# Patient Record
Sex: FEMALE | Race: WHITE | NOT HISPANIC OR LATINO | Employment: PART TIME | URBAN - METROPOLITAN AREA
[De-identification: names, ages, dates, MRNs, and addresses within clinical notes are randomized per-mention and may not be internally consistent; named-entity substitution may affect disease eponyms.]

---

## 2017-01-26 ENCOUNTER — APPOINTMENT (OUTPATIENT)
Dept: WOMENS IMAGING | Facility: HOSPITAL | Age: 62
End: 2017-01-26

## 2017-01-26 PROCEDURE — 77067 SCR MAMMO BI INCL CAD: CPT | Performed by: RADIOLOGY

## 2017-03-24 RX ORDER — LEVOTHYROXINE SODIUM 0.1 MG/1
100 TABLET ORAL DAILY
Qty: 30 TABLET | Refills: 0 | Status: SHIPPED | OUTPATIENT
Start: 2017-03-24 | End: 2017-05-01 | Stop reason: SDUPTHER

## 2017-05-01 RX ORDER — LEVOTHYROXINE SODIUM 0.1 MG/1
TABLET ORAL
Qty: 30 TABLET | Refills: 1 | Status: SHIPPED | OUTPATIENT
Start: 2017-05-01 | End: 2017-07-03 | Stop reason: SDUPTHER

## 2017-06-09 ENCOUNTER — HOSPITAL ENCOUNTER (OUTPATIENT)
Dept: GENERAL RADIOLOGY | Facility: HOSPITAL | Age: 62
Discharge: HOME | End: 2017-06-09
Attending: FAMILY MEDICINE | Admitting: FAMILY MEDICINE

## 2017-06-09 ENCOUNTER — APPOINTMENT (OUTPATIENT)
Dept: GENERAL RADIOLOGY | Facility: HOSPITAL | Age: 62
End: 2017-06-09
Attending: FAMILY MEDICINE

## 2017-06-09 DIAGNOSIS — Z13.1 SCREENING FOR DIABETES MELLITUS (DM): ICD-10-CM

## 2017-06-09 DIAGNOSIS — Z00.00 ANNUAL PHYSICAL EXAM: Primary | ICD-10-CM

## 2017-06-09 PROCEDURE — 71020 HC CHEST PA AND LATERAL: CPT

## 2017-06-10 LAB
ALBUMIN SERPL-MCNC: 4.7 G/DL (ref 3.5–5.5)
ALBUMIN/GLOB SERPL: 2.2 {RATIO} (ref 1.2–2.2)
ALP SERPL-CCNC: 57 IU/L (ref 39–117)
ALT SERPL-CCNC: 16 IU/L (ref 0–32)
AST SERPL-CCNC: 21 IU/L (ref 0–40)
BASOPHILS # BLD AUTO: 0 X10E3/UL (ref 0–0.2)
BASOPHILS NFR BLD AUTO: 1 %
BILIRUB SERPL-MCNC: 0.5 MG/DL (ref 0–1.2)
BUN SERPL-MCNC: 11 MG/DL (ref 6–24)
BUN/CREAT SERPL: 18 (ref 9–23)
CALCIUM SERPL-MCNC: 9.9 MG/DL (ref 8.7–10.2)
CHLORIDE SERPL-SCNC: 98 MMOL/L (ref 96–106)
CHOLEST SERPL-MCNC: 217 MG/DL (ref 100–199)
CO2 SERPL-SCNC: 23 MMOL/L (ref 18–29)
CREAT SERPL-MCNC: 0.62 MG/DL (ref 0.57–1)
EOSINOPHIL # BLD AUTO: 0.1 X10E3/UL (ref 0–0.4)
EOSINOPHIL NFR BLD AUTO: 2 %
ERYTHROCYTE [DISTWIDTH] IN BLOOD BY AUTOMATED COUNT: 14.3 % (ref 12.3–15.4)
FT4I SERPL CALC-MCNC: 3.6 (ref 1.2–4.9)
GLOBULIN SER CALC-MCNC: 2.1 G/DL (ref 1.5–4.5)
GLUCOSE SERPL-MCNC: 79 MG/DL (ref 65–99)
HBA1C MFR BLD: 5.5 % (ref 4.8–5.6)
HCT VFR BLD AUTO: 38.9 % (ref 34–46.6)
HDLC SERPL-MCNC: 67 MG/DL
HGB BLD-MCNC: 12.7 G/DL (ref 11.1–15.9)
IMM GRANULOCYTES # BLD: 0 X10E3/UL (ref 0–0.1)
IMM GRANULOCYTES NFR BLD: 0 %
LDLC SERPL CALC-MCNC: 132 MG/DL (ref 0–99)
LDLC/HDLC SERPL: 2 RATIO UNITS (ref 0–3.2)
LYMPHOCYTES # BLD AUTO: 1.6 X10E3/UL (ref 0.7–3.1)
LYMPHOCYTES NFR BLD AUTO: 38 %
MCH RBC QN AUTO: 31.7 PG (ref 26.6–33)
MCHC RBC AUTO-ENTMCNC: 32.6 G/DL (ref 31.5–35.7)
MCV RBC AUTO: 97 FL (ref 79–97)
MONOCYTES # BLD AUTO: 0.5 X10E3/UL (ref 0.1–0.9)
MONOCYTES NFR BLD AUTO: 11 %
NEUTROPHILS # BLD AUTO: 2.1 X10E3/UL (ref 1.4–7)
NEUTROPHILS NFR BLD AUTO: 48 %
PLATELET # BLD AUTO: 365 X10E3/UL (ref 150–379)
POTASSIUM SERPL-SCNC: 5 MMOL/L (ref 3.5–5.2)
PROT SERPL-MCNC: 6.8 G/DL (ref 6–8.5)
RBC # BLD AUTO: 4.01 X10E6/UL (ref 3.77–5.28)
SODIUM SERPL-SCNC: 139 MMOL/L (ref 134–144)
T3RU NFR SERPL: 36 % (ref 24–39)
T4 SERPL-MCNC: 10 UG/DL (ref 4.5–12)
TRIGL SERPL-MCNC: 92 MG/DL (ref 0–149)
TSH SERPL DL<=0.005 MIU/L-ACNC: 0.34 UIU/ML (ref 0.45–4.5)
VLDLC SERPL CALC-MCNC: 18 MG/DL (ref 5–40)
WBC # BLD AUTO: 4.3 X10E3/UL (ref 3.4–10.8)

## 2017-06-14 ENCOUNTER — OFFICE VISIT (OUTPATIENT)
Dept: FAMILY MEDICINE CLINIC | Facility: CLINIC | Age: 62
End: 2017-06-14

## 2017-06-14 ENCOUNTER — TELEPHONE (OUTPATIENT)
Dept: FAMILY MEDICINE CLINIC | Facility: CLINIC | Age: 62
End: 2017-06-14

## 2017-06-14 VITALS
TEMPERATURE: 98.1 F | OXYGEN SATURATION: 98 % | HEIGHT: 60 IN | WEIGHT: 114.6 LBS | DIASTOLIC BLOOD PRESSURE: 90 MMHG | HEART RATE: 65 BPM | SYSTOLIC BLOOD PRESSURE: 138 MMHG | BODY MASS INDEX: 22.5 KG/M2

## 2017-06-14 DIAGNOSIS — Z53.21 PATIENT LEFT WITHOUT BEING SEEN: ICD-10-CM

## 2017-06-14 DIAGNOSIS — Z00.00 ANNUAL PHYSICAL EXAM: Primary | ICD-10-CM

## 2017-06-14 LAB
BILIRUB BLD-MCNC: NEGATIVE MG/DL
CLARITY, POC: CLEAR
COLOR UR: YELLOW
GLUCOSE UR STRIP-MCNC: NEGATIVE MG/DL
KETONES UR QL: NEGATIVE
LEUKOCYTE EST, POC: ABNORMAL
NITRITE UR-MCNC: NEGATIVE MG/ML
PH UR: 6 [PH] (ref 5–8)
PROT UR STRIP-MCNC: NEGATIVE MG/DL
RBC # UR STRIP: ABNORMAL /UL
SP GR UR: 1.03 (ref 1–1.03)
UROBILINOGEN UR QL: NORMAL

## 2017-06-14 PROCEDURE — 81003 URINALYSIS AUTO W/O SCOPE: CPT | Performed by: FAMILY MEDICINE

## 2017-06-14 RX ORDER — HYDROXYCHLOROQUINE SULFATE 200 MG/1
200 TABLET, FILM COATED ORAL 2 TIMES DAILY
COMMUNITY
Start: 2017-05-31 | End: 2018-05-24

## 2017-06-14 NOTE — PROGRESS NOTES
PT LEFT WITHOUT BEING SEEN    Chief Complaint and HPI    I have reviewed the patient's medical history in detail and updated the computerized patient record.    Subjective: Stacey Meyer is a 61 y.o. female presents   here today for     Annual Exam (pt is doing fine ,some left hip pain once in a while)      Review of Systems   Constitutional: Negative.    HENT: Negative.    Eyes: Negative.    Respiratory: Negative.    Cardiovascular: Negative.    Gastrointestinal: Negative.    Endocrine: Negative.    Genitourinary: Negative.    Musculoskeletal: Positive for back pain.        Left hip pain   Skin: Negative.    Allergic/Immunologic: Positive for environmental allergies.   Neurological: Negative.    Psychiatric/Behavioral: The patient is nervous/anxious.        Physical Exam    Procedures    Assessment:  No diagnosis found.    Plan:  No orders of the defined types were placed in this encounter.      Requested Prescriptions      No prescriptions requested or ordered in this encounter       All tests and consults since last visit reviewed with patient    No Follow-up on file.

## 2017-06-14 NOTE — TELEPHONE ENCOUNTER
----- Message from Anna Araujo sent at 6/14/2017 11:10 AM EDT -----  She had a physical today and she could not wait any longer and wants the dr to call her about her test results

## 2017-07-03 RX ORDER — LEVOTHYROXINE SODIUM 0.1 MG/1
TABLET ORAL
Qty: 30 TABLET | Refills: 1 | Status: SHIPPED | OUTPATIENT
Start: 2017-07-03 | End: 2017-08-25 | Stop reason: SDUPTHER

## 2017-07-05 ENCOUNTER — OFFICE VISIT (OUTPATIENT)
Dept: FAMILY MEDICINE CLINIC | Facility: CLINIC | Age: 62
End: 2017-07-05

## 2017-07-05 VITALS
HEIGHT: 60 IN | DIASTOLIC BLOOD PRESSURE: 86 MMHG | WEIGHT: 114.2 LBS | TEMPERATURE: 98.5 F | BODY MASS INDEX: 22.42 KG/M2 | SYSTOLIC BLOOD PRESSURE: 138 MMHG | OXYGEN SATURATION: 97 % | HEART RATE: 69 BPM

## 2017-07-05 DIAGNOSIS — F32.89 OTHER DEPRESSION: Primary | ICD-10-CM

## 2017-07-05 DIAGNOSIS — E03.9 ACQUIRED HYPOTHYROIDISM: ICD-10-CM

## 2017-07-05 DIAGNOSIS — M06.09 RHEUMATOID ARTHRITIS OF MULTIPLE SITES WITH NEGATIVE RHEUMATOID FACTOR (HCC): ICD-10-CM

## 2017-07-05 PROCEDURE — 99396 PREV VISIT EST AGE 40-64: CPT | Performed by: FAMILY MEDICINE

## 2017-07-05 RX ORDER — ESCITALOPRAM OXALATE 20 MG/1
20 TABLET ORAL DAILY
Qty: 90 TABLET | Refills: 1 | Status: SHIPPED | OUTPATIENT
Start: 2017-07-05 | End: 2018-06-27 | Stop reason: SDUPTHER

## 2017-07-05 NOTE — PROGRESS NOTES
Chief Complaint and HPI  I have reviewed the patient's medical history in detail and updated the computerized patient record.    Subjective: Stacey Meyer is a 61 y.o. female presents   here today for PE    Annual Exam (pt is having some pain left hip and neck in the past 2 months); Anxiety (back on Lexapro 20mg); Insomnia (lexapro fixed); Arthritis (? RA on plaquenil); Hypothyroidism (see lab); and Fatigue (snores, ok in am-/ due to not working)      Review of Systems   Constitutional: Negative.    HENT: Negative.    Eyes: Negative.    Respiratory: Negative.    Cardiovascular: Negative.    Gastrointestinal: Negative.    Endocrine: Negative.    Genitourinary:        Menopause age 49   Musculoskeletal: Positive for neck pain.        Left hip pain  No osteoporosis-followed by GYN   Skin: Negative.    Allergic/Immunologic: Negative.    Neurological: Negative.    Hematological: Negative.    Psychiatric/Behavioral: The patient is nervous/anxious.        Physical Exam   Constitutional: She is oriented to person, place, and time. She appears well-developed and well-nourished.   HENT:   Head: Normocephalic.   Right Ear: External ear normal.   Left Ear: External ear normal.   Nose: Nose normal.   Mouth/Throat: Oropharynx is clear and moist.   Eyes: Conjunctivae and EOM are normal. Pupils are equal, round, and reactive to light. Right eye exhibits no discharge. Left eye exhibits no discharge. No scleral icterus.   Neck: Normal range of motion. Neck supple.   Cardiovascular: Normal rate, regular rhythm, normal heart sounds and intact distal pulses.    Pulmonary/Chest: Effort normal and breath sounds normal. No respiratory distress. She has no wheezes. She has no rales. She exhibits no tenderness.   Abdominal: Soft. Bowel sounds are normal.   Genitourinary:   Genitourinary Comments: deferres   Musculoskeletal: She exhibits no edema, tenderness or deformity.   Ulnar deviation hands,Heberdeens nodes   Neurological: She is  alert and oriented to person, place, and time. She displays normal reflexes. No cranial nerve deficit.   Vitals reviewed.      Procedures    Assessment:   Diagnosis Plan   1. Other depression     2. Rheumatoid arthritis of multiple sites with negative rheumatoid factor     3. Acquired hypothyroidism         Plan:  No orders of the defined types were placed in this encounter.      Requested Prescriptions     Signed Prescriptions Disp Refills   • escitalopram (LEXAPRO) 20 MG tablet 90 tablet 1     Sig: Take 1 tablet by mouth Daily.       All tests and consults since last visit reviewed with patient    Return in about 6 months (around 1/5/2018).

## 2017-07-06 ENCOUNTER — OFFICE VISIT (OUTPATIENT)
Dept: ORTHOPEDIC SURGERY | Facility: CLINIC | Age: 62
End: 2017-07-06

## 2017-07-06 VITALS — WEIGHT: 110 LBS | BODY MASS INDEX: 21.6 KG/M2 | HEIGHT: 60 IN | TEMPERATURE: 97.9 F

## 2017-07-06 DIAGNOSIS — Z96.641 H/O TOTAL HIP ARTHROPLASTY, RIGHT: ICD-10-CM

## 2017-07-06 DIAGNOSIS — Z96.641 STATUS POST TOTAL REPLACEMENT OF RIGHT HIP: Primary | ICD-10-CM

## 2017-07-06 PROCEDURE — 99212 OFFICE O/P EST SF 10 MIN: CPT | Performed by: ORTHOPAEDIC SURGERY

## 2017-07-06 PROCEDURE — 73502 X-RAY EXAM HIP UNI 2-3 VIEWS: CPT | Performed by: ORTHOPAEDIC SURGERY

## 2017-07-06 NOTE — PROGRESS NOTES
"Stacey Meyer : 1955 MRN: [id number] DATE: 2017    Chief Complaint:  Follow up right total hip      SUBJECTIVE:Patient returns today for  1year follow up of right total hip replacement. Patient reports doing well with no unusual complaints. Denies any limitations due to the hip.    OBJECTIVE:    Temp 97.9 °F (36.6 °C)  Ht 60\" (152.4 cm)  Wt 110 lb (49.9 kg)  BMI 21.48 kg/m2  Family History   Problem Relation Age of Onset   • Liver disease Mother    • Coronary artery disease Father    • Heart disease Father    • Stroke Maternal Aunt      Past Medical History:   Diagnosis Date   • Anxiety    • Back pain    • Depression    • Ectopic pregnancy    • Hip pain, right    • History of transfusion    • Hypertension    • Low back pain    • Osteoarthritis    • Peptic ulcer     WAS TOLD IN ER LAST WEEK WHEN SHE WAS HERE MAY 2016   • PONV (postoperative nausea and vomiting)    • Thyroid disease     HYPOTHYROID   • Vomiting and diarrhea     peptic ulcer related to ibuprofen     Past Surgical History:   Procedure Laterality Date   • COLONOSCOPY         • ECTOPIC PREGNANCY SURGERY Bilateral     2 SEPARATE SURGERIES   • ENDOSCOPY N/A 2016    Procedure: ESOPHAGOGASTRODUODENOSCOPY WITH COLD BIOPSIES;  Surgeon: Danny Costa MD;  Location: [facility] ENDOSCOPY;  Service:    • ENDOSCOPY N/A 2016    Procedure: ESOPHAGOGASTRODUODENOSCOPY;  Surgeon: Danny Costa MD;  Location: [facility] ENDOSCOPY;  Service:    • FERTILITY SURGERY     • FOOT SURGERY Bilateral    • TONSILLECTOMY     • TOTAL HIP ARTHROPLASTY Right 2016    Procedure: RIGHT TOTAL HIP ARTHROPLASTY ANTERIOR APPROACH;  Surgeon: Eric Clayton MD;  Location: [facility] OR;  Service:      Social History     Social History   • Marital status:      Spouse name: N/A   • Number of children: 2   • Years of education: bachelor's degree     Occupational History   • teacher      Social History Main Topics   • Smoking status: Former Smoker     " Packs/day: 1.00     Years: 10.00     Types: Cigarettes     Quit date: 1986   • Smokeless tobacco: Never Used   • Alcohol use 2.4 oz/week     4 Glasses of wine per week   • Drug use: No   • Sexual activity: Defer     Other Topics Concern   • Not on file     Social History Narrative       Review of Systems: 14 point review of systems performed pertinent positives and negatives discussed above, all other systems are negative    Exam:. The incision is well healed. Range of motion is good without irritability. The calf is soft and nontender with a negative Homans sign. Alignment is neutral. Leg lengths are equal. Good hip flexion and abduction strength.Walks with nonantalgic gait. Intact to light touch with palpable distal pulses.     DIAGNOSTIC STUDIES  Xrays:Xrays 2 view right hip AP and lateral ordered: ordered and reviewed demonstrate a well positioned RK without complicating factors. There are no previous films for comparision.    ASSESSMENT:    Follow up right hip replacement. doing well with the exception of left posterior hip sx which I belive are mild radicular sx. She is going to see her chiropracter.        PLAN:   Continue activities as tolerated  Follow up in 1 year    Eric Clayton MD  7/6/2017

## 2017-08-24 ENCOUNTER — TELEPHONE (OUTPATIENT)
Dept: ORTHOPEDIC SURGERY | Facility: CLINIC | Age: 62
End: 2017-08-24

## 2017-08-24 DIAGNOSIS — IMO0002 PROPHYLACTIC ANTIBIOTIC FOR DENTAL PROCEDURE INDICATED DUE TO PRIOR JOINT REPLACEMENT: Primary | ICD-10-CM

## 2017-08-24 RX ORDER — CEPHALEXIN 500 MG/1
CAPSULE ORAL
Qty: 4 CAPSULE | Refills: 2 | Status: SHIPPED | OUTPATIENT
Start: 2017-08-24 | End: 2018-10-09

## 2017-08-24 NOTE — TELEPHONE ENCOUNTER
Have E prescribed a new prescription to [facility] pharmacy at [phone] for Keflex 500 mg, #4, directions her to take all 4 capsules 1 hour prior to her procedure.  Refill ×2 per RBB

## 2017-08-25 RX ORDER — LEVOTHYROXINE SODIUM 0.1 MG/1
TABLET ORAL
Qty: 30 TABLET | Refills: 1 | Status: SHIPPED | OUTPATIENT
Start: 2017-08-25 | End: 2017-11-22 | Stop reason: SDUPTHER

## 2017-11-22 RX ORDER — LEVOTHYROXINE SODIUM 0.1 MG/1
TABLET ORAL
Qty: 30 TABLET | Refills: 3 | Status: SHIPPED | OUTPATIENT
Start: 2017-11-22 | End: 2018-10-09 | Stop reason: SDUPTHER

## 2018-05-23 ENCOUNTER — APPOINTMENT (OUTPATIENT)
Dept: WOMENS IMAGING | Facility: HOSPITAL | Age: 63
End: 2018-05-23

## 2018-05-23 PROCEDURE — 77063 BREAST TOMOSYNTHESIS BI: CPT | Performed by: RADIOLOGY

## 2018-05-23 PROCEDURE — 77067 SCR MAMMO BI INCL CAD: CPT | Performed by: RADIOLOGY

## 2018-05-24 ENCOUNTER — OFFICE VISIT (OUTPATIENT)
Dept: ORTHOPEDIC SURGERY | Facility: CLINIC | Age: 63
End: 2018-05-24

## 2018-05-24 VITALS — HEIGHT: 60 IN | WEIGHT: 113.6 LBS | BODY MASS INDEX: 22.3 KG/M2 | TEMPERATURE: 98.2 F

## 2018-05-24 DIAGNOSIS — M25.552 LEFT HIP PAIN: Primary | ICD-10-CM

## 2018-05-24 PROCEDURE — 99213 OFFICE O/P EST LOW 20 MIN: CPT | Performed by: ORTHOPAEDIC SURGERY

## 2018-05-24 PROCEDURE — 73502 X-RAY EXAM HIP UNI 2-3 VIEWS: CPT | Performed by: ORTHOPAEDIC SURGERY

## 2018-05-24 NOTE — PROGRESS NOTES
"Patient: Stacey Meyer  YOB: 1955 62 y.o. female  Medical Record Number: [id number]    Chief Complaint:   Chief Complaint   Patient presents with   • Left Hip - Establish Care, Pain       History of Present Illness:Stacey Meyer is a 62 y.o. female who presents for follow-up of  Left hip pain.  She has pain both in the groin and lateral aspect of the hip which she rates as moderate to severe.  It recently started and is increased over the last few weeks.  She's had previous right total hip replacement which is doing well.  She describes the pain as a constant ache worse with certain positions.    Allergies:   Allergies   Allergen Reactions   • Percodan [Oxycodone-Aspirin] Nausea And Vomiting   • Erythromycin Rash   • Flagyl [Metronidazole] Rash   • Sulfa Antibiotics Rash   • Tetracyclines & Related Rash       Medications:   Current Outpatient Prescriptions   Medication Sig Dispense Refill   • cephalexin (KEFLEX) 500 MG capsule Take all 4 caps 1 hour prior to procedure 4 capsule 2   • escitalopram (LEXAPRO) 20 MG tablet Take 1 tablet by mouth Daily. (Patient taking differently: Take 30 mg by mouth Daily.) 90 tablet 1   • levothyroxine (SYNTHROID, LEVOTHROID) 100 MCG tablet TAKE 1 TABLET BY MOUTH DAILY. 30 tablet 3     No current facility-administered medications for this visit.          The following portions of the patient's history were reviewed and updated as appropriate: allergies, current medications, past family history, past medical history, past social history, past surgical history and problem list.    Review of Systems:   A 14 point review of systems was performed. All systems negative except pertinent positives/negative listed in HPI above    Physical Exam:   Vitals:    05/24/18 1338   Temp: 98.2 °F (36.8 °C)   TempSrc: Temporal Artery    Weight: 51.5 kg (113 lb 9.6 oz)   Height: 152.4 cm (60\")       General: A and O x 3, ASA, NAD    SCLERA:    Normal    DENTITION:   Normal  Hip:  " left    LEG ALIGNMENT:     Neutral        LEG LENGTH DISCREPANCY   :    none    GAIT:     Antalgic    SKIN:     No abnormality    RANGE OF MOTION:     Limited by joint irritability    STRENGTH:     Limited by joint irratibility    DISTAL PULSES:    Paplable    DISTAL SENSATION :   Intact    LYMPHATICS:     No   lymphadenopathy    OTHER:          +   Stinchfeld test      -    log roll      +   Tenderness to palpation trochanteric bursa     Radiology:    Xrays 2views left hip  (AP bilateral hips and lateral hip) were ordered and reviewed for evaluation of hip pain demonstrating moderate joint space narrowing  Comparison views: todays xrays were compared to previous xrays and show new findings as described above    Assessment/Plan:  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  I'm going to set her up for a fluoroscopy guided injection into the hip joint.  If she's not better she'll let us know and return      Eric Clayton MD  5/24/2018

## 2018-06-11 ENCOUNTER — HOSPITAL ENCOUNTER (OUTPATIENT)
Dept: GENERAL RADIOLOGY | Facility: HOSPITAL | Age: 63
Discharge: HOME | End: 2018-06-11
Attending: ORTHOPAEDIC SURGERY | Admitting: ORTHOPAEDIC SURGERY

## 2018-06-11 DIAGNOSIS — M25.552 LEFT HIP PAIN: ICD-10-CM

## 2018-06-11 PROCEDURE — 77002 NEEDLE LOCALIZATION BY XRAY: CPT

## 2018-06-27 RX ORDER — ESCITALOPRAM OXALATE 20 MG/1
20 TABLET ORAL DAILY
Qty: 90 TABLET | Refills: 0 | Status: SHIPPED | OUTPATIENT
Start: 2018-06-27 | End: 2018-09-24 | Stop reason: SDUPTHER

## 2018-06-27 RX ORDER — ESCITALOPRAM OXALATE 20 MG/1
20 TABLET ORAL DAILY
Qty: 90 TABLET | Refills: 1 | OUTPATIENT
Start: 2018-06-27

## 2018-09-24 RX ORDER — ESCITALOPRAM OXALATE 20 MG/1
TABLET ORAL
Qty: 90 TABLET | Refills: 0 | Status: SHIPPED | OUTPATIENT
Start: 2018-09-24 | End: 2018-10-09 | Stop reason: SDUPTHER

## 2018-10-09 ENCOUNTER — RESULTS ENCOUNTER (OUTPATIENT)
Dept: INTERNAL MEDICINE | Facility: CLINIC | Age: 63
End: 2018-10-09

## 2018-10-09 ENCOUNTER — OFFICE VISIT (OUTPATIENT)
Dept: INTERNAL MEDICINE | Facility: CLINIC | Age: 63
End: 2018-10-09

## 2018-10-09 VITALS
HEIGHT: 60 IN | WEIGHT: 115.4 LBS | DIASTOLIC BLOOD PRESSURE: 99 MMHG | TEMPERATURE: 97.4 F | SYSTOLIC BLOOD PRESSURE: 162 MMHG | OXYGEN SATURATION: 98 % | HEART RATE: 80 BPM | BODY MASS INDEX: 22.65 KG/M2

## 2018-10-09 DIAGNOSIS — M06.09 RHEUMATOID ARTHRITIS OF MULTIPLE SITES WITH NEGATIVE RHEUMATOID FACTOR (HCC): Primary | ICD-10-CM

## 2018-10-09 DIAGNOSIS — N30.90 CYSTITIS: ICD-10-CM

## 2018-10-09 DIAGNOSIS — E03.9 ACQUIRED HYPOTHYROIDISM: ICD-10-CM

## 2018-10-09 DIAGNOSIS — F32.0 CURRENT MILD EPISODE OF MAJOR DEPRESSIVE DISORDER WITHOUT PRIOR EPISODE (HCC): ICD-10-CM

## 2018-10-09 DIAGNOSIS — E03.9 HYPOTHYROIDISM (ACQUIRED): ICD-10-CM

## 2018-10-09 DIAGNOSIS — R82.2 BILIRUBINURIA: Primary | ICD-10-CM

## 2018-10-09 DIAGNOSIS — Z01.818 PREOP GENERAL PHYSICAL EXAM: ICD-10-CM

## 2018-10-09 LAB
BILIRUB BLD-MCNC: ABNORMAL MG/DL
CLARITY, POC: CLEAR
COLOR UR: YELLOW
GLUCOSE UR STRIP-MCNC: NEGATIVE MG/DL
KETONES UR QL: ABNORMAL
LEUKOCYTE EST, POC: NEGATIVE
NITRITE UR-MCNC: NEGATIVE MG/ML
PH UR: 6 [PH] (ref 5–8)
PROT UR STRIP-MCNC: NEGATIVE MG/DL
RBC # UR STRIP: NEGATIVE /UL
SP GR UR: 1.02 (ref 1–1.03)
UROBILINOGEN UR QL: NORMAL

## 2018-10-09 PROCEDURE — 93000 ELECTROCARDIOGRAM COMPLETE: CPT | Performed by: FAMILY MEDICINE

## 2018-10-09 PROCEDURE — 99214 OFFICE O/P EST MOD 30 MIN: CPT | Performed by: FAMILY MEDICINE

## 2018-10-09 PROCEDURE — 81003 URINALYSIS AUTO W/O SCOPE: CPT | Performed by: FAMILY MEDICINE

## 2018-10-09 RX ORDER — ESCITALOPRAM OXALATE 20 MG/1
20 TABLET ORAL DAILY
Qty: 90 TABLET | Refills: 3 | Status: SHIPPED | OUTPATIENT
Start: 2018-10-09 | End: 2019-11-16 | Stop reason: SDUPTHER

## 2018-10-09 RX ORDER — LEVOTHYROXINE SODIUM 0.1 MG/1
100 TABLET ORAL DAILY
Qty: 90 TABLET | Refills: 3 | Status: SHIPPED | OUTPATIENT
Start: 2018-10-09 | End: 2018-10-09 | Stop reason: SDUPTHER

## 2018-10-09 RX ORDER — HYDROXYCHLOROQUINE SULFATE 200 MG/1
200 TABLET, FILM COATED ORAL DAILY
COMMUNITY

## 2018-10-09 RX ORDER — LEVOTHYROXINE SODIUM 0.1 MG/1
100 TABLET ORAL DAILY
Qty: 90 TABLET | Refills: 3 | Status: SHIPPED | OUTPATIENT
Start: 2018-10-09 | End: 2018-10-25

## 2018-10-10 ENCOUNTER — TELEPHONE (OUTPATIENT)
Dept: INTERNAL MEDICINE | Facility: CLINIC | Age: 63
End: 2018-10-10

## 2018-10-10 LAB
APPEARANCE UR: CLEAR
BACTERIA #/AREA URNS HPF: ABNORMAL /HPF
BILIRUB UR QL STRIP: NEGATIVE
COLOR UR: YELLOW
CRYSTALS URNS MICRO: ABNORMAL
EPI CELLS #/AREA URNS HPF: ABNORMAL /HPF
FT4I SERPL CALC-MCNC: 2.4 (ref 1.2–4.9)
GLUCOSE UR QL: NEGATIVE
HGB UR QL STRIP: NEGATIVE
KETONES UR QL STRIP: ABNORMAL
LEUKOCYTE ESTERASE UR QL STRIP: NEGATIVE
MICRO URNS: ABNORMAL
MICRO URNS: ABNORMAL
MUCOUS THREADS URNS QL MICRO: PRESENT /HPF
NITRITE UR QL STRIP: NEGATIVE
PH UR STRIP: 5.5 [PH] (ref 5–7.5)
PROT UR QL STRIP: ABNORMAL
RBC #/AREA URNS HPF: ABNORMAL /HPF
SP GR UR: 1.02 (ref 1–1.03)
T3RU NFR SERPL: 28 % (ref 24–39)
T4 SERPL-MCNC: 8.6 UG/DL (ref 4.5–12)
TSH SERPL DL<=0.005 MIU/L-ACNC: 8.54 UIU/ML (ref 0.45–4.5)
UNIDENT CRYS URNS QL MICRO: PRESENT /LPF
URINALYSIS REFLEX: ABNORMAL
UROBILINOGEN UR STRIP-MCNC: 0.2 MG/DL (ref 0.2–1)
WBC #/AREA URNS HPF: ABNORMAL /HPF

## 2018-10-10 NOTE — TELEPHONE ENCOUNTER
Pt called this morning saying you had called her last night but she was half sleep when you called her so she really didn't understand what you was saying and pt would like a call back from you. Please Advise

## 2018-10-12 LAB
BACTERIA UR CULT: NORMAL
BACTERIA UR CULT: NORMAL

## 2018-10-14 ENCOUNTER — RESULTS ENCOUNTER (OUTPATIENT)
Dept: INTERNAL MEDICINE | Facility: CLINIC | Age: 63
End: 2018-10-14

## 2018-10-14 DIAGNOSIS — E03.9 ACQUIRED HYPOTHYROIDISM: ICD-10-CM

## 2018-10-14 DIAGNOSIS — E03.9 HYPOTHYROIDISM (ACQUIRED): ICD-10-CM

## 2018-10-14 DIAGNOSIS — R82.2 BILIRUBINURIA: ICD-10-CM

## 2018-10-15 ENCOUNTER — TELEPHONE (OUTPATIENT)
Dept: INTERNAL MEDICINE | Facility: CLINIC | Age: 63
End: 2018-10-15

## 2018-10-25 ENCOUNTER — TELEPHONE (OUTPATIENT)
Dept: INTERNAL MEDICINE | Facility: CLINIC | Age: 63
End: 2018-10-25

## 2018-10-25 RX ORDER — LEVOTHYROXINE SODIUM 112 UG/1
112 TABLET ORAL DAILY
Qty: 90 TABLET | Refills: 3 | Status: SHIPPED | OUTPATIENT
Start: 2018-10-25 | End: 2020-03-13

## 2018-10-30 ENCOUNTER — APPOINTMENT (OUTPATIENT)
Dept: PREADMISSION TESTING | Facility: HOSPITAL | Age: 63
End: 2018-10-30

## 2018-10-30 VITALS
BODY MASS INDEX: 22.78 KG/M2 | DIASTOLIC BLOOD PRESSURE: 93 MMHG | OXYGEN SATURATION: 96 % | TEMPERATURE: 98.5 F | HEART RATE: 91 BPM | WEIGHT: 116 LBS | RESPIRATION RATE: 20 BRPM | HEIGHT: 60 IN | SYSTOLIC BLOOD PRESSURE: 153 MMHG

## 2018-10-30 LAB
ANION GAP SERPL CALCULATED.3IONS-SCNC: 16.6 MMOL/L
BUN BLD-MCNC: 10 MG/DL (ref 8–23)
BUN/CREAT SERPL: 12 (ref 7–25)
CALCIUM SPEC-SCNC: 9.7 MG/DL (ref 8.6–10.5)
CHLORIDE SERPL-SCNC: 98 MMOL/L (ref 98–107)
CO2 SERPL-SCNC: 23.4 MMOL/L (ref 22–29)
CREAT BLD-MCNC: 0.83 MG/DL (ref 0.57–1)
DEPRECATED RDW RBC AUTO: 44.6 FL (ref 37–54)
ERYTHROCYTE [DISTWIDTH] IN BLOOD BY AUTOMATED COUNT: 12.4 % (ref 11.7–13)
GFR SERPL CREATININE-BSD FRML MDRD: 70 ML/MIN/1.73
GLUCOSE BLD-MCNC: 90 MG/DL (ref 65–99)
HCT VFR BLD AUTO: 39.3 % (ref 35.6–45.5)
HGB BLD-MCNC: 12.2 G/DL (ref 11.9–15.5)
MCH RBC QN AUTO: 30.6 PG (ref 26.9–32)
MCHC RBC AUTO-ENTMCNC: 31 G/DL (ref 32.4–36.3)
MCV RBC AUTO: 98.5 FL (ref 80.5–98.2)
PLATELET # BLD AUTO: 320 10*3/MM3 (ref 140–500)
PMV BLD AUTO: 10.1 FL (ref 6–12)
POTASSIUM BLD-SCNC: 3.7 MMOL/L (ref 3.5–5.2)
RBC # BLD AUTO: 3.99 10*6/MM3 (ref 3.9–5.2)
SODIUM BLD-SCNC: 138 MMOL/L (ref 136–145)
WBC NRBC COR # BLD: 7.19 10*3/MM3 (ref 4.5–10.7)

## 2018-10-30 PROCEDURE — 36415 COLL VENOUS BLD VENIPUNCTURE: CPT

## 2018-10-30 PROCEDURE — 80048 BASIC METABOLIC PNL TOTAL CA: CPT | Performed by: SPECIALIST

## 2018-10-30 PROCEDURE — 85027 COMPLETE CBC AUTOMATED: CPT | Performed by: SPECIALIST

## 2018-11-13 ENCOUNTER — ANESTHESIA EVENT (OUTPATIENT)
Dept: PERIOP | Facility: HOSPITAL | Age: 63
End: 2018-11-13

## 2018-11-14 ENCOUNTER — HOSPITAL ENCOUNTER (OUTPATIENT)
Facility: HOSPITAL | Age: 63
Discharge: HOME | End: 2018-11-15
Attending: SPECIALIST | Admitting: SPECIALIST

## 2018-11-14 ENCOUNTER — ANESTHESIA (OUTPATIENT)
Dept: PERIOP | Facility: HOSPITAL | Age: 63
End: 2018-11-14

## 2018-11-14 PROBLEM — N62 GIGANTOMASTIA: Status: ACTIVE | Noted: 2018-11-14

## 2018-11-14 PROCEDURE — 25010000002 HYDRALAZINE PER 20 MG: Performed by: SPECIALIST

## 2018-11-14 PROCEDURE — 25010000002 FENTANYL CITRATE (PF) 100 MCG/2ML SOLUTION: Performed by: NURSE ANESTHETIST, CERTIFIED REGISTERED

## 2018-11-14 PROCEDURE — 25010000002 PROPOFOL 10 MG/ML EMULSION: Performed by: NURSE ANESTHETIST, CERTIFIED REGISTERED

## 2018-11-14 PROCEDURE — 25010000002 HYDROMORPHONE PER 4 MG: Performed by: NURSE ANESTHETIST, CERTIFIED REGISTERED

## 2018-11-14 PROCEDURE — 25010000002 MIDAZOLAM PER 1 MG: Performed by: ANESTHESIOLOGY

## 2018-11-14 PROCEDURE — 25010000003 CEFAZOLIN IN DEXTROSE 2-4 GM/100ML-% SOLUTION: Performed by: SPECIALIST

## 2018-11-14 PROCEDURE — 25010000002 ONDANSETRON PER 1 MG: Performed by: NURSE ANESTHETIST, CERTIFIED REGISTERED

## 2018-11-14 PROCEDURE — 25010000002 PHENYLEPHRINE PER 1 ML: Performed by: NURSE ANESTHETIST, CERTIFIED REGISTERED

## 2018-11-14 PROCEDURE — 25010000002 DEXAMETHASONE PER 1 MG: Performed by: NURSE ANESTHETIST, CERTIFIED REGISTERED

## 2018-11-14 PROCEDURE — G0378 HOSPITAL OBSERVATION PER HR: HCPCS

## 2018-11-14 RX ORDER — PROMETHAZINE HYDROCHLORIDE 25 MG/1
12.5 TABLET ORAL ONCE AS NEEDED
Status: DISCONTINUED | OUTPATIENT
Start: 2018-11-14 | End: 2018-11-14 | Stop reason: HOSPADM

## 2018-11-14 RX ORDER — ROCURONIUM BROMIDE 10 MG/ML
INJECTION, SOLUTION INTRAVENOUS AS NEEDED
Status: DISCONTINUED | OUTPATIENT
Start: 2018-11-14 | End: 2018-11-14 | Stop reason: SURG

## 2018-11-14 RX ORDER — LABETALOL HYDROCHLORIDE 5 MG/ML
INJECTION, SOLUTION INTRAVENOUS AS NEEDED
Status: DISCONTINUED | OUTPATIENT
Start: 2018-11-14 | End: 2018-11-14 | Stop reason: SURG

## 2018-11-14 RX ORDER — FENTANYL CITRATE 50 UG/ML
INJECTION, SOLUTION INTRAMUSCULAR; INTRAVENOUS AS NEEDED
Status: DISCONTINUED | OUTPATIENT
Start: 2018-11-14 | End: 2018-11-14 | Stop reason: SURG

## 2018-11-14 RX ORDER — PROPOFOL 10 MG/ML
VIAL (ML) INTRAVENOUS AS NEEDED
Status: DISCONTINUED | OUTPATIENT
Start: 2018-11-14 | End: 2018-11-14 | Stop reason: SURG

## 2018-11-14 RX ORDER — FLUMAZENIL 0.1 MG/ML
0.2 INJECTION INTRAVENOUS AS NEEDED
Status: DISCONTINUED | OUTPATIENT
Start: 2018-11-14 | End: 2018-11-14 | Stop reason: HOSPADM

## 2018-11-14 RX ORDER — PROMETHAZINE HYDROCHLORIDE 25 MG/ML
12.5 INJECTION, SOLUTION INTRAMUSCULAR; INTRAVENOUS ONCE AS NEEDED
Status: DISCONTINUED | OUTPATIENT
Start: 2018-11-14 | End: 2018-11-14 | Stop reason: HOSPADM

## 2018-11-14 RX ORDER — GLYCOPYRROLATE 0.2 MG/ML
INJECTION INTRAMUSCULAR; INTRAVENOUS AS NEEDED
Status: DISCONTINUED | OUTPATIENT
Start: 2018-11-14 | End: 2018-11-14 | Stop reason: SURG

## 2018-11-14 RX ORDER — NALOXONE HCL 0.4 MG/ML
0.2 VIAL (ML) INJECTION AS NEEDED
Status: DISCONTINUED | OUTPATIENT
Start: 2018-11-14 | End: 2018-11-14 | Stop reason: HOSPADM

## 2018-11-14 RX ORDER — LIDOCAINE HYDROCHLORIDE 10 MG/ML
0.5 INJECTION, SOLUTION EPIDURAL; INFILTRATION; INTRACAUDAL; PERINEURAL ONCE AS NEEDED
Status: DISCONTINUED | OUTPATIENT
Start: 2018-11-14 | End: 2018-11-14 | Stop reason: HOSPADM

## 2018-11-14 RX ORDER — ONDANSETRON 4 MG/1
4 TABLET, FILM COATED ORAL EVERY 6 HOURS PRN
Status: DISCONTINUED | OUTPATIENT
Start: 2018-11-14 | End: 2018-11-15 | Stop reason: HOSPADM

## 2018-11-14 RX ORDER — OXYCODONE AND ACETAMINOPHEN 7.5; 325 MG/1; MG/1
1 TABLET ORAL ONCE AS NEEDED
Status: DISCONTINUED | OUTPATIENT
Start: 2018-11-14 | End: 2018-11-14 | Stop reason: HOSPADM

## 2018-11-14 RX ORDER — ONDANSETRON 2 MG/ML
4 INJECTION INTRAMUSCULAR; INTRAVENOUS EVERY 6 HOURS PRN
Status: DISCONTINUED | OUTPATIENT
Start: 2018-11-14 | End: 2018-11-15 | Stop reason: HOSPADM

## 2018-11-14 RX ORDER — ONDANSETRON 2 MG/ML
4 INJECTION INTRAMUSCULAR; INTRAVENOUS ONCE AS NEEDED
Status: COMPLETED | OUTPATIENT
Start: 2018-11-14 | End: 2018-11-14

## 2018-11-14 RX ORDER — SODIUM CHLORIDE 0.9 % (FLUSH) 0.9 %
3-10 SYRINGE (ML) INJECTION AS NEEDED
Status: DISCONTINUED | OUTPATIENT
Start: 2018-11-14 | End: 2018-11-15 | Stop reason: HOSPADM

## 2018-11-14 RX ORDER — DIPHENHYDRAMINE HCL 25 MG
25 CAPSULE ORAL EVERY 6 HOURS PRN
Status: DISCONTINUED | OUTPATIENT
Start: 2018-11-14 | End: 2018-11-14 | Stop reason: HOSPADM

## 2018-11-14 RX ORDER — TEMAZEPAM 15 MG/1
15 CAPSULE ORAL NIGHTLY PRN
Status: DISCONTINUED | OUTPATIENT
Start: 2018-11-14 | End: 2018-11-15 | Stop reason: HOSPADM

## 2018-11-14 RX ORDER — MIDAZOLAM HYDROCHLORIDE 1 MG/ML
1 INJECTION INTRAMUSCULAR; INTRAVENOUS
Status: DISCONTINUED | OUTPATIENT
Start: 2018-11-14 | End: 2018-11-14 | Stop reason: HOSPADM

## 2018-11-14 RX ORDER — HYDROMORPHONE HCL 110MG/55ML
PATIENT CONTROLLED ANALGESIA SYRINGE INTRAVENOUS AS NEEDED
Status: DISCONTINUED | OUTPATIENT
Start: 2018-11-14 | End: 2018-11-14 | Stop reason: SURG

## 2018-11-14 RX ORDER — LIDOCAINE HYDROCHLORIDE 20 MG/ML
INJECTION, SOLUTION INFILTRATION; PERINEURAL AS NEEDED
Status: DISCONTINUED | OUTPATIENT
Start: 2018-11-14 | End: 2018-11-14 | Stop reason: SURG

## 2018-11-14 RX ORDER — HYDROCODONE BITARTRATE AND ACETAMINOPHEN 7.5; 325 MG/1; MG/1
1 TABLET ORAL ONCE AS NEEDED
Status: DISCONTINUED | OUTPATIENT
Start: 2018-11-14 | End: 2018-11-14 | Stop reason: HOSPADM

## 2018-11-14 RX ORDER — ONDANSETRON 2 MG/ML
INJECTION INTRAMUSCULAR; INTRAVENOUS AS NEEDED
Status: DISCONTINUED | OUTPATIENT
Start: 2018-11-14 | End: 2018-11-14 | Stop reason: SURG

## 2018-11-14 RX ORDER — SCOLOPAMINE TRANSDERMAL SYSTEM 1 MG/1
1 PATCH, EXTENDED RELEASE TRANSDERMAL
Status: DISCONTINUED | OUTPATIENT
Start: 2018-11-14 | End: 2018-11-14 | Stop reason: HOSPADM

## 2018-11-14 RX ORDER — HYDROMORPHONE HYDROCHLORIDE 1 MG/ML
0.5 INJECTION, SOLUTION INTRAMUSCULAR; INTRAVENOUS; SUBCUTANEOUS
Status: DISCONTINUED | OUTPATIENT
Start: 2018-11-14 | End: 2018-11-14 | Stop reason: HOSPADM

## 2018-11-14 RX ORDER — HYDROCODONE BITARTRATE AND ACETAMINOPHEN 5; 325 MG/1; MG/1
1 TABLET ORAL EVERY 4 HOURS PRN
Status: DISCONTINUED | OUTPATIENT
Start: 2018-11-14 | End: 2018-11-15 | Stop reason: HOSPADM

## 2018-11-14 RX ORDER — FENTANYL CITRATE 50 UG/ML
50 INJECTION, SOLUTION INTRAMUSCULAR; INTRAVENOUS
Status: DISCONTINUED | OUTPATIENT
Start: 2018-11-14 | End: 2018-11-14 | Stop reason: HOSPADM

## 2018-11-14 RX ORDER — DEXAMETHASONE SODIUM PHOSPHATE 10 MG/ML
INJECTION INTRAMUSCULAR; INTRAVENOUS AS NEEDED
Status: DISCONTINUED | OUTPATIENT
Start: 2018-11-14 | End: 2018-11-14 | Stop reason: SURG

## 2018-11-14 RX ORDER — CEFAZOLIN SODIUM 2 G/100ML
2 INJECTION, SOLUTION INTRAVENOUS EVERY 8 HOURS
Status: DISCONTINUED | OUTPATIENT
Start: 2018-11-14 | End: 2018-11-15 | Stop reason: HOSPADM

## 2018-11-14 RX ORDER — SODIUM CHLORIDE 0.9 % (FLUSH) 0.9 %
3 SYRINGE (ML) INJECTION EVERY 12 HOURS SCHEDULED
Status: DISCONTINUED | OUTPATIENT
Start: 2018-11-14 | End: 2018-11-15 | Stop reason: HOSPADM

## 2018-11-14 RX ORDER — LABETALOL HYDROCHLORIDE 5 MG/ML
5 INJECTION, SOLUTION INTRAVENOUS
Status: DISCONTINUED | OUTPATIENT
Start: 2018-11-14 | End: 2018-11-14 | Stop reason: HOSPADM

## 2018-11-14 RX ORDER — SODIUM CHLORIDE, SODIUM LACTATE, POTASSIUM CHLORIDE, CALCIUM CHLORIDE 600; 310; 30; 20 MG/100ML; MG/100ML; MG/100ML; MG/100ML
9 INJECTION, SOLUTION INTRAVENOUS CONTINUOUS
Status: DISCONTINUED | OUTPATIENT
Start: 2018-11-14 | End: 2018-11-15 | Stop reason: HOSPADM

## 2018-11-14 RX ORDER — MORPHINE SULFATE 2 MG/ML
4 INJECTION, SOLUTION INTRAMUSCULAR; INTRAVENOUS
Status: DISCONTINUED | OUTPATIENT
Start: 2018-11-14 | End: 2018-11-15 | Stop reason: HOSPADM

## 2018-11-14 RX ORDER — PROMETHAZINE HYDROCHLORIDE 25 MG/1
25 SUPPOSITORY RECTAL ONCE AS NEEDED
Status: DISCONTINUED | OUTPATIENT
Start: 2018-11-14 | End: 2018-11-14 | Stop reason: HOSPADM

## 2018-11-14 RX ORDER — MIDAZOLAM HYDROCHLORIDE 1 MG/ML
2 INJECTION INTRAMUSCULAR; INTRAVENOUS
Status: DISCONTINUED | OUTPATIENT
Start: 2018-11-14 | End: 2018-11-14 | Stop reason: HOSPADM

## 2018-11-14 RX ORDER — HYDRALAZINE HYDROCHLORIDE 20 MG/ML
15 INJECTION INTRAMUSCULAR; INTRAVENOUS ONCE
Status: COMPLETED | OUTPATIENT
Start: 2018-11-14 | End: 2018-11-14

## 2018-11-14 RX ORDER — CEFAZOLIN SODIUM 2 G/100ML
2 INJECTION, SOLUTION INTRAVENOUS ONCE
Status: DISCONTINUED | OUTPATIENT
Start: 2018-11-14 | End: 2018-11-14 | Stop reason: HOSPADM

## 2018-11-14 RX ORDER — NALOXONE HCL 0.4 MG/ML
0.4 VIAL (ML) INJECTION
Status: DISCONTINUED | OUTPATIENT
Start: 2018-11-14 | End: 2018-11-15 | Stop reason: HOSPADM

## 2018-11-14 RX ORDER — ONDANSETRON 4 MG/1
4 TABLET, ORALLY DISINTEGRATING ORAL EVERY 6 HOURS PRN
Status: DISCONTINUED | OUTPATIENT
Start: 2018-11-14 | End: 2018-11-15 | Stop reason: HOSPADM

## 2018-11-14 RX ORDER — GINSENG 100 MG
CAPSULE ORAL AS NEEDED
Status: DISCONTINUED | OUTPATIENT
Start: 2018-11-14 | End: 2018-11-14 | Stop reason: HOSPADM

## 2018-11-14 RX ORDER — ACETAMINOPHEN 325 MG/1
650 TABLET ORAL EVERY 4 HOURS PRN
Status: DISCONTINUED | OUTPATIENT
Start: 2018-11-14 | End: 2018-11-15 | Stop reason: HOSPADM

## 2018-11-14 RX ORDER — MEPERIDINE HYDROCHLORIDE 25 MG/ML
12.5 INJECTION INTRAMUSCULAR; INTRAVENOUS; SUBCUTANEOUS
Status: DISCONTINUED | OUTPATIENT
Start: 2018-11-14 | End: 2018-11-14 | Stop reason: HOSPADM

## 2018-11-14 RX ORDER — SODIUM CHLORIDE 0.9 % (FLUSH) 0.9 %
1-10 SYRINGE (ML) INJECTION AS NEEDED
Status: DISCONTINUED | OUTPATIENT
Start: 2018-11-14 | End: 2018-11-14 | Stop reason: HOSPADM

## 2018-11-14 RX ORDER — PROMETHAZINE HYDROCHLORIDE 25 MG/1
25 TABLET ORAL ONCE AS NEEDED
Status: DISCONTINUED | OUTPATIENT
Start: 2018-11-14 | End: 2018-11-14 | Stop reason: HOSPADM

## 2018-11-14 RX ORDER — FAMOTIDINE 10 MG/ML
20 INJECTION, SOLUTION INTRAVENOUS ONCE
Status: COMPLETED | OUTPATIENT
Start: 2018-11-14 | End: 2018-11-14

## 2018-11-14 RX ORDER — MAGNESIUM HYDROXIDE 1200 MG/15ML
LIQUID ORAL AS NEEDED
Status: DISCONTINUED | OUTPATIENT
Start: 2018-11-14 | End: 2018-11-14 | Stop reason: HOSPADM

## 2018-11-14 RX ORDER — EPHEDRINE SULFATE 50 MG/ML
5 INJECTION, SOLUTION INTRAVENOUS ONCE AS NEEDED
Status: DISCONTINUED | OUTPATIENT
Start: 2018-11-14 | End: 2018-11-14 | Stop reason: HOSPADM

## 2018-11-14 RX ADMIN — Medication 3 ML: at 21:23

## 2018-11-14 RX ADMIN — FAMOTIDINE 20 MG: 10 INJECTION, SOLUTION INTRAVENOUS at 06:18

## 2018-11-14 RX ADMIN — HYDROCODONE BITARTRATE AND ACETAMINOPHEN 1 TABLET: 5; 325 TABLET ORAL at 19:13

## 2018-11-14 RX ADMIN — SODIUM CHLORIDE, POTASSIUM CHLORIDE, SODIUM LACTATE AND CALCIUM CHLORIDE 9 ML/HR: 600; 310; 30; 20 INJECTION, SOLUTION INTRAVENOUS at 06:03

## 2018-11-14 RX ADMIN — PHENYLEPHRINE HYDROCHLORIDE 100 MCG: 10 INJECTION INTRAVENOUS at 12:07

## 2018-11-14 RX ADMIN — PHENYLEPHRINE HYDROCHLORIDE 100 MCG: 10 INJECTION INTRAVENOUS at 11:35

## 2018-11-14 RX ADMIN — FENTANYL CITRATE 50 MCG: 50 INJECTION INTRAMUSCULAR; INTRAVENOUS at 08:20

## 2018-11-14 RX ADMIN — PHENYLEPHRINE HYDROCHLORIDE 100 MCG: 10 INJECTION INTRAVENOUS at 12:31

## 2018-11-14 RX ADMIN — GLYCOPYRROLATE 0.3 MG: 0.2 INJECTION INTRAMUSCULAR; INTRAVENOUS at 10:11

## 2018-11-14 RX ADMIN — SODIUM CHLORIDE, POTASSIUM CHLORIDE, SODIUM LACTATE AND CALCIUM CHLORIDE: 600; 310; 30; 20 INJECTION, SOLUTION INTRAVENOUS at 10:24

## 2018-11-14 RX ADMIN — SODIUM CHLORIDE, POTASSIUM CHLORIDE, SODIUM LACTATE AND CALCIUM CHLORIDE: 600; 310; 30; 20 INJECTION, SOLUTION INTRAVENOUS at 09:13

## 2018-11-14 RX ADMIN — ONDANSETRON 4 MG: 2 INJECTION INTRAMUSCULAR; INTRAVENOUS at 14:15

## 2018-11-14 RX ADMIN — PHENYLEPHRINE HYDROCHLORIDE 50 MCG: 10 INJECTION INTRAVENOUS at 10:01

## 2018-11-14 RX ADMIN — ONDANSETRON 4 MG: 4 TABLET, FILM COATED ORAL at 19:13

## 2018-11-14 RX ADMIN — HYDROMORPHONE HYDROCHLORIDE 0.5 MG: 2 INJECTION INTRAMUSCULAR; INTRAVENOUS; SUBCUTANEOUS at 13:11

## 2018-11-14 RX ADMIN — MIDAZOLAM 2 MG: 1 INJECTION INTRAMUSCULAR; INTRAVENOUS at 07:36

## 2018-11-14 RX ADMIN — SODIUM CHLORIDE, POTASSIUM CHLORIDE, SODIUM LACTATE AND CALCIUM CHLORIDE: 600; 310; 30; 20 INJECTION, SOLUTION INTRAVENOUS at 07:53

## 2018-11-14 RX ADMIN — HYDROMORPHONE HYDROCHLORIDE 0.5 MG: 2 INJECTION INTRAMUSCULAR; INTRAVENOUS; SUBCUTANEOUS at 08:45

## 2018-11-14 RX ADMIN — SUGAMMADEX 200 MG: 100 INJECTION, SOLUTION INTRAVENOUS at 13:10

## 2018-11-14 RX ADMIN — DEXAMETHASONE SODIUM PHOSPHATE 8 MG: 10 INJECTION INTRAMUSCULAR; INTRAVENOUS at 08:23

## 2018-11-14 RX ADMIN — SCOPALAMINE 1 PATCH: 1 PATCH, EXTENDED RELEASE TRANSDERMAL at 06:18

## 2018-11-14 RX ADMIN — HYDROCODONE BITARTRATE AND ACETAMINOPHEN 1 TABLET: 5; 325 TABLET ORAL at 23:04

## 2018-11-14 RX ADMIN — HYDROMORPHONE HYDROCHLORIDE 0.5 MG: 2 INJECTION INTRAMUSCULAR; INTRAVENOUS; SUBCUTANEOUS at 09:24

## 2018-11-14 RX ADMIN — CEFAZOLIN SODIUM 2 G: 2 INJECTION, SOLUTION INTRAVENOUS at 21:22

## 2018-11-14 RX ADMIN — ROCURONIUM BROMIDE 20 MG: 10 INJECTION INTRAVENOUS at 11:35

## 2018-11-14 RX ADMIN — FENTANYL CITRATE 100 MCG: 50 INJECTION INTRAMUSCULAR; INTRAVENOUS at 07:54

## 2018-11-14 RX ADMIN — HYDROMORPHONE HYDROCHLORIDE 0.5 MG: 2 INJECTION INTRAMUSCULAR; INTRAVENOUS; SUBCUTANEOUS at 10:21

## 2018-11-14 RX ADMIN — FENTANYL CITRATE 50 MCG: 50 INJECTION, SOLUTION INTRAMUSCULAR; INTRAVENOUS at 14:30

## 2018-11-14 RX ADMIN — LIDOCAINE HYDROCHLORIDE 100 MG: 20 INJECTION, SOLUTION INFILTRATION; PERINEURAL at 07:59

## 2018-11-14 RX ADMIN — ROCURONIUM BROMIDE 40 MG: 10 INJECTION INTRAVENOUS at 07:59

## 2018-11-14 RX ADMIN — HYDRALAZINE HYDROCHLORIDE 15 MG: 20 INJECTION INTRAMUSCULAR; INTRAVENOUS at 14:50

## 2018-11-14 RX ADMIN — LABETALOL HYDROCHLORIDE 5 MG: 5 INJECTION, SOLUTION INTRAVENOUS at 08:48

## 2018-11-14 RX ADMIN — ONDANSETRON 4 MG: 2 INJECTION INTRAMUSCULAR; INTRAVENOUS at 13:09

## 2018-11-14 RX ADMIN — PROPOFOL 150 MG: 10 INJECTION, EMULSION INTRAVENOUS at 07:59

## 2018-11-14 RX ADMIN — PHENYLEPHRINE HYDROCHLORIDE 50 MCG: 10 INJECTION INTRAVENOUS at 09:44

## 2018-11-14 RX ADMIN — SODIUM CHLORIDE, POTASSIUM CHLORIDE, SODIUM LACTATE AND CALCIUM CHLORIDE: 600; 310; 30; 20 INJECTION, SOLUTION INTRAVENOUS at 13:18

## 2018-11-14 NOTE — OP NOTE
DATE OF SURGERY: [11/14/18]    PREOPERATIVE DIAGNOSIS:  [Bilateral gigantomastia]    POSTOPERATIVE DIAGNOSIS: Same []    PROCEDURE PERFORMED:  [Bilateral reduction mammoplasty]    ANESTHESIA:  [General]    SURGEON:  Myranda Perea MD    OPERATIVE INDICATIONS:  [The patient is a 63-year-old white female who presents with the chief complaint of symptoms of large breasts bilaterally.  She desires a breast reduction.  We have discussed in the office appropriate sizes for this patient postoperatively, however she understands we cannot guarantee a specific cup size.  Patient's preoperative mammogram is stable and within normal limits.  This patient has been cleared for this surgery by her primary care position physician, Dr. Pena.  Risks, benefist, limitations, alternatives, complications, options and revisions were outlined to the patien,t and she gave her consent.  She was also given perioperative antibiotics.  Markings were made in the upright sitting position for an inferior pedicle, central mound technique of breast reduction.    Past medical history:  Medications: Lexapro, thyroid medication.  Allergies: Sulfa, erythromycin, tetracycline, Percodan  Past surgical history: Hip surgery, foot surgery, ectopic pregnancy    Physical examination: The patient has large breasts bilaterally with no obvious masses to palpation.  The right side is larger and more ptotic than the left, and there is grade 3 ptosis bilaterally.. ]    OPERATIVE TECHNIQUE:  The patient was taken to the operating room and placed in supine position.  After successful anesthesia was undertaken with general endotracheal technique, the patient's chest and upper extremities were prepped and draped in the usual sterile fashion.  The procedure was begun by fashioning a 6 cm pedicle on both sides.  A similar technique of dissection was used on both sides and once I will be dictated.  The pedicle was de-epithelialized superiorly and inferiorly around a  reduced nipple areola complex to 42 mm.  Medial and lateral breast resection was accomplished and the skin flaps were tailored to 1-1-1/2 cm of thickness to the chest wall.  Care was taken to avoid the neurovascular supply to the nipple medially and laterally.  After reducing the breast central mound a total of 187 g of been removed from both sides and was discarded.  The pedicle was tacked to the chest wall with 3-0 Vicryl superiorly and medially.  Irrigation was accomplished with dilute Betadine solution.  A 0.25 inch round silicone drain was placed exiting the lateral aspect of the inframammary fold incision, and was secured to the skin with 3-0 nylon, and later hooked to bulb suction drainage.  The skin flaps were then tacked inferiorly at the T with 2-0 Vicryl.  Skin was approximated with staples.  The incisions were then closed after removal of the staples with 3-0 Vicryl, 4-0 Monocryl, running subcuticular 4-0 PDS, and 5-0 plain.  The nipple areolar complex was withdrawn through a 38 mm cut out.  It was then inset using 4-0 Monocryl and 5-0 nylon half buried mattress sutures.  The nipple areolar complex remained quite viable and vascular throughout the procedure.  Postsurgical dressings were placed and a postoperative bra, and the patient was extubated in the operating room, and returned to the recovery room in stable condition.  All sponge, instrument, needle, and lap counts were correct at the conclusion of the procedure.    COMPLICATIONS:  None.

## 2018-11-14 NOTE — ANESTHESIA POSTPROCEDURE EVALUATION
Patient: Stacey Meyer    Procedure Summary     Date:  11/14/18 Room / Location:  [facility] OR  / [facility] MAIN OR    Anesthesia Start:  0753 Anesthesia Stop:  1347    Procedure:  BREAST REDUCTION BILATERAL, NEW IMAGE (Bilateral Chest) Diagnosis:      Surgeon:  Myranda Perea MD Provider:  Nael Salvador MD    Anesthesia Type:  general ASA Status:  2          Anesthesia Type: general  Last vitals  BP   168/100 (11/14/18 1345)   Temp   36.9 °C (98.5 °F) (11/14/18 1339)   Pulse   97 (11/14/18 1345)   Resp   12 (11/14/18 1345)     SpO2   99 % (11/14/18 1345)     Post Anesthesia Care and Evaluation    Patient location during evaluation: PACU  Patient participation: complete - patient participated  Level of consciousness: awake and alert  Pain management: adequate  Airway patency: patent  Anesthetic complications: No anesthetic complications    Cardiovascular status: acceptable  Respiratory status: acceptable  Hydration status: acceptable    Comments: --------------------            11/14/18               1345     --------------------   BP:      168/100     Pulse:      97       Resp:       12       Temp:                SpO2:      99%      --------------------

## 2018-11-14 NOTE — ANESTHESIA PROCEDURE NOTES
ANESTHESIA INTUBATION  Urgency: elective    Date/Time: 11/14/2018 8:02 AM  Airway not difficult    General Information and Staff    Patient location during procedure: OR  Anesthesiologist: Nael Salvador MD  CRNA: Milagro Dos Santos CRNA    Indications and Patient Condition  Indications for airway management: airway protection    Preoxygenated: yes  MILS not maintained throughout  Mask difficulty assessment: 1 - vent by mask    Final Airway Details  Final airway type: endotracheal airway      Successful airway: ETT  Cuffed: yes   Successful intubation technique: direct laryngoscopy  Facilitating devices/methods: anterior pressure/BURP  Endotracheal tube insertion site: oral  Blade: Jose  Blade size: 3  ETT size (mm): 7.0  Cormack-Lehane Classification: grade IIa - partial view of glottis  Placement verified by: chest auscultation and capnometry   Cuff volume (mL): 7  Measured from: lips  ETT to lips (cm): 21  Number of attempts at approach: 1    Additional Comments  Pt preoxygenated prior to induction, easy mask airway, atraumatic intubation,+ ETCO2, + bs bilat,  ETT secured and connected to ventilator.

## 2018-11-14 NOTE — ANESTHESIA PREPROCEDURE EVALUATION
Anesthesia Evaluation     Patient summary reviewed and Nursing notes reviewed   history of anesthetic complications: PONV               Airway   Mallampati: II  Small opening  Dental      Pulmonary    (+) a smoker Former,   Cardiovascular     ECG reviewed  Rhythm: regular  Rate: normal    (+) hypertension,       Neuro/Psych  (+) psychiatric history Depression and Anxiety,     GI/Hepatic/Renal/Endo    (+)   hypothyroidism,     Musculoskeletal     Abdominal    Substance History - negative use     OB/GYN negative ob/gyn ROS         Other   (+) arthritis                   Anesthesia Plan    ASA 2     general   (Airway not difficult by history)  intravenous induction   Anesthetic plan, all risks, benefits, and alternatives have been provided, discussed and informed consent has been obtained with: patient.

## 2018-11-15 VITALS
RESPIRATION RATE: 16 BRPM | WEIGHT: 116 LBS | HEART RATE: 68 BPM | TEMPERATURE: 98.4 F | OXYGEN SATURATION: 95 % | DIASTOLIC BLOOD PRESSURE: 64 MMHG | HEIGHT: 61 IN | BODY MASS INDEX: 21.9 KG/M2 | SYSTOLIC BLOOD PRESSURE: 103 MMHG

## 2018-11-15 PROCEDURE — 25010000003 CEFAZOLIN IN DEXTROSE 2-4 GM/100ML-% SOLUTION: Performed by: SPECIALIST

## 2018-11-15 PROCEDURE — 25010000002 MORPHINE PER 10 MG: Performed by: SPECIALIST

## 2018-11-15 PROCEDURE — G0378 HOSPITAL OBSERVATION PER HR: HCPCS

## 2018-11-15 RX ADMIN — HYDROCODONE BITARTRATE AND ACETAMINOPHEN 1 TABLET: 5; 325 TABLET ORAL at 07:51

## 2018-11-15 RX ADMIN — NITROGLYCERIN 1 INCH: 20 OINTMENT TOPICAL at 10:10

## 2018-11-15 RX ADMIN — CEFAZOLIN SODIUM 2 G: 2 INJECTION, SOLUTION INTRAVENOUS at 05:23

## 2018-11-15 RX ADMIN — MORPHINE SULFATE 2 MG: 2 INJECTION, SOLUTION INTRAMUSCULAR; INTRAVENOUS at 07:00

## 2018-11-15 RX ADMIN — HYDROCODONE BITARTRATE AND ACETAMINOPHEN 1 TABLET: 5; 325 TABLET ORAL at 03:41

## 2018-11-15 RX ADMIN — HYDROCODONE BITARTRATE AND ACETAMINOPHEN 1 TABLET: 5; 325 TABLET ORAL at 11:12

## 2018-11-15 NOTE — PLAN OF CARE
Problem: Patient Care Overview  Goal: Plan of Care Review  Outcome: Ongoing (interventions implemented as appropriate)   11/15/18 4583   Plan of Care Review   Progress improving   OTHER   Outcome Summary VSS. Pain well controlled. Dressing with small shadowing drainage both breasts, area soft. Flip drainage serous sanguinous- minimal in amount. Voided freely. Ambulated in the carroll. Slept on and off. Weaned off from O2. Diet tolerated.      Goal: Individualization and Mutuality  Outcome: Ongoing (interventions implemented as appropriate)    Goal: Discharge Needs Assessment  Outcome: Ongoing (interventions implemented as appropriate)    Goal: Interprofessional Rounds/Family Conf  Outcome: Ongoing (interventions implemented as appropriate)      Problem: Breast Surgery/Reconstruction (Adult)  Goal: Signs and Symptoms of Listed Potential Problems Will be Absent, Minimized or Managed (Breast Surgery/Reconstruction)  Outcome: Ongoing (interventions implemented as appropriate)    Goal: Anesthesia/Sedation Recovery  Outcome: Ongoing (interventions implemented as appropriate)

## 2018-11-15 NOTE — PROGRESS NOTES
Discharge Planning Assessment  [facility]     Patient Name: Stacey Meyer  MRN: [id number]  Today's Date: 11/15/2018    Admit Date: 11/14/2018    Discharge Needs Assessment    No documentation.       Discharge Plan     Row Name 11/15/18 1137       Plan    Final Discharge Disposition Code  01 - home or self-care           Kimberli Kent RN

## 2019-01-08 DIAGNOSIS — N39.0 URINARY TRACT INFECTION WITHOUT HEMATURIA, SITE UNSPECIFIED: Primary | ICD-10-CM

## 2019-01-10 LAB
ALBUMIN SERPL-MCNC: 4.8 G/DL (ref 3.5–5.2)
ALBUMIN/GLOB SERPL: 1.7 G/DL
ALP SERPL-CCNC: 79 U/L (ref 39–117)
ALT SERPL-CCNC: 14 U/L (ref 1–33)
AST SERPL-CCNC: 24 U/L (ref 1–32)
BILIRUB SERPL-MCNC: 0.6 MG/DL (ref 0.1–1.2)
BUN SERPL-MCNC: 17 MG/DL (ref 8–23)
BUN/CREAT SERPL: 22.1 (ref 7–25)
CALCIUM SERPL-MCNC: 10.5 MG/DL (ref 8.6–10.5)
CHLORIDE SERPL-SCNC: 98 MMOL/L (ref 98–107)
CO2 SERPL-SCNC: 24.4 MMOL/L (ref 22–29)
CREAT SERPL-MCNC: 0.77 MG/DL (ref 0.57–1)
FT4I SERPL CALC-MCNC: 2 (ref 1.2–4.9)
GLOBULIN SER CALC-MCNC: 2.8 GM/DL
GLUCOSE SERPL-MCNC: 89 MG/DL (ref 65–99)
POTASSIUM SERPL-SCNC: 4.5 MMOL/L (ref 3.5–5.2)
PROT SERPL-MCNC: 7.6 G/DL (ref 6–8.5)
SODIUM SERPL-SCNC: 141 MMOL/L (ref 136–145)
T3RU NFR SERPL: 28 % (ref 24–39)
T4 SERPL-MCNC: 7.3 UG/DL (ref 4.5–12)
TSH SERPL DL<=0.005 MIU/L-ACNC: 2.8 UIU/ML (ref 0.45–4.5)

## 2019-01-29 ENCOUNTER — TELEPHONE (OUTPATIENT)
Dept: INTERNAL MEDICINE | Facility: CLINIC | Age: 64
End: 2019-01-29

## 2019-02-20 ENCOUNTER — HOSPITAL ENCOUNTER (EMERGENCY)
Facility: HOSPITAL | Age: 64
Discharge: HOME | End: 2019-02-20
Attending: EMERGENCY MEDICINE | Admitting: EMERGENCY MEDICINE

## 2019-02-20 VITALS
WEIGHT: 120 LBS | BODY MASS INDEX: 22.66 KG/M2 | HEART RATE: 77 BPM | TEMPERATURE: 96.4 F | HEIGHT: 61 IN | DIASTOLIC BLOOD PRESSURE: 82 MMHG | OXYGEN SATURATION: 96 % | SYSTOLIC BLOOD PRESSURE: 120 MMHG | RESPIRATION RATE: 18 BRPM

## 2019-02-20 DIAGNOSIS — S01.511A LACERATION OF LOWER LIP, INITIAL ENCOUNTER: Primary | ICD-10-CM

## 2019-02-20 PROCEDURE — 99283 EMERGENCY DEPT VISIT LOW MDM: CPT

## 2019-02-20 RX ORDER — LIDOCAINE HYDROCHLORIDE AND EPINEPHRINE 10; 10 MG/ML; UG/ML
20 INJECTION, SOLUTION INFILTRATION; PERINEURAL ONCE
Status: DISCONTINUED | OUTPATIENT
Start: 2019-02-20 | End: 2019-02-20 | Stop reason: HOSPADM

## 2019-02-20 NOTE — ED PROVIDER NOTES
EMERGENCY DEPARTMENT ENCOUNTER    CHIEF COMPLAINT  Chief Complaint: Lip Laceration  History given by: patient  History limited by:   Room Number: 26/26  PMD: Chi Pena MD      HPI:  Pt is a 63 y.o. female who presents complaining of lower lip laceration after a mechanical fall that occurred PTA. Pt reports she woke up to use restroom and fell biting her lip. She denies any LOC and does not take anticoagulants. She denies any loose teeth, but states having some dental pain.    Duration: PTA  Onset: sudden  Timing: constant  Location: lower lip  Radiation: none  Quality: laceration  Intensity/Severity: moderate  Progression: unchanged  Associated Symptoms: dental pain  Aggravating Factors: none  Alleviating Factors: none  Previous Episodes: none  Treatment before arrival: none    PAST MEDICAL HISTORY  Active Ambulatory Problems     Diagnosis Date Noted   • Degeneration of lumbar intervertebral disc 02/29/2016   • Degeneration, intervertebral disc, thoracic 02/29/2016   • Fibromyalgia 06/04/2016   • Depression 06/04/2016   • Transient insomnia 06/04/2016   • Primary osteoarthritis of right hip 06/07/2016   • Familial ligamentous laxity 06/07/2016   • Hypertension 07/11/2016   • OA (osteoarthritis) of hip 07/13/2016   • Acquired hypothyroidism 07/05/2017   • Rheumatoid arthritis of multiple sites with negative rheumatoid factor (CMS/[facility]) 07/05/2017   • Hypothyroidism (acquired) 10/09/2018   • Cystitis 10/09/2018   • Gigantomastia 11/14/2018     Resolved Ambulatory Problems     Diagnosis Date Noted   • No Resolved Ambulatory Problems     Past Medical History:   Diagnosis Date   • Anxiety    • Depression    • History of peptic ulcer    • History of transfusion    • Hypertension    • Osteoarthritis    • PONV (postoperative nausea and vomiting)    • Thyroid disease        PAST SURGICAL HISTORY  Past Surgical History:   Procedure Laterality Date   • BILATERAL BREAST REDUCTION Bilateral 11/14/2018    Procedure:  "BREAST REDUCTION BILATERAL, NEW IMAGE;  Surgeon: Myranda Perea MD;  Location: [facility] OR;  Service: New Image   • BUNIONECTOMY Left    • COLONOSCOPY         • ECTOPIC PREGNANCY SURGERY Bilateral     2 SEPARATE SURGERIES   • ENDOSCOPY N/A 2016    Procedure: ESOPHAGOGASTRODUODENOSCOPY WITH COLD BIOPSIES;  Surgeon: Danny Costa MD;  Location: [facility] ENDOSCOPY;  Service:    • ENDOSCOPY N/A 2016    Procedure: ESOPHAGOGASTRODUODENOSCOPY;  Surgeon: Danny Costa MD;  Location: [facility] ENDOSCOPY;  Service:    • FERTILITY SURGERY     • FOOT SURGERY Bilateral     \"THEY BROKE MY 5TH TOE ON BOTH SIDES AND STRIGHTENED THEM\"    • HAMMER TOE REPAIR Bilateral    • TONSILLECTOMY     • TOTAL HIP ARTHROPLASTY Right 2016    Procedure: RIGHT TOTAL HIP ARTHROPLASTY ANTERIOR APPROACH;  Surgeon: Eric Clayton MD;  Location: [facility];  Service:        FAMILY HISTORY  Family History   Problem Relation Age of Onset   • Liver disease Mother    • Coronary artery disease Father    • Heart disease Father    • Stroke Maternal Aunt    • Malig Hyperthermia Neg Hx        SOCIAL HISTORY  Social History     Socioeconomic History   • Marital status:      Spouse name: Not on file   • Number of children: 2   • Years of education: bachelor's degree   • Highest education level: Not on file   Social Needs   • Financial resource strain: Not on file   • Food insecurity - worry: Not on file   • Food insecurity - inability: Not on file   • Transportation needs - medical: Not on file   • Transportation needs - non-medical: Not on file   Occupational History   • Occupation: teacher   Tobacco Use   • Smoking status: Former Smoker     Packs/day: 1.00     Years: 10.00     Pack years: 10.00     Types: Cigarettes     Last attempt to quit:      Years since quittin.1   • Smokeless tobacco: Never Used   Substance and Sexual Activity   • Alcohol use: Yes     Alcohol/week: 2.4 oz     Types: 4 Glasses of wine per " week   • Drug use: No   • Sexual activity: Defer   Other Topics Concern   • Not on file   Social History Narrative   • Not on file       ALLERGIES  Erythromycin; Metronidazole; Oxycodone-aspirin; Sulfa antibiotics; Tetracycline; and Tetracyclines & related    REVIEW OF SYSTEMS  Review of Systems   Constitutional: Negative for fever.   Respiratory: Negative for shortness of breath.    Cardiovascular: Negative for chest pain.   Skin: Positive for wound (lower lip).       PHYSICAL EXAM  ED Triage Vitals [02/20/19 0435]   Temp Heart Rate Resp BP SpO2   96.4 °F (35.8 °C) 77 18 -- 96 %      Temp src Heart Rate Source Patient Position BP Location FiO2 (%)   Tympanic -- -- -- --       Physical Exam   Constitutional: She is oriented to person, place, and time. No distress.   HENT:   Head: Head is with abrasion (chin).   Mouth/Throat: Oropharynx is clear and moist. Lacerations (lower interior lip that is through and through, but does not cross vermillion border) present.   No dental injury, no malocclusion.   Eyes: EOM are normal. Pupils are equal, round, and reactive to light.   Neck: Normal range of motion.   Cardiovascular: Normal rate and regular rhythm.   Pulmonary/Chest: Effort normal and breath sounds normal. No respiratory distress.   Neurological: She is alert and oriented to person, place, and time. She has normal sensation and normal strength.   Skin: Skin is warm and dry.   Psychiatric: Affect normal.   Nursing note and vitals reviewed.      PROCEDURES  Laceration Repair  Date/Time: 2/20/2019 5:02 AM  Performed by: Negro Rolle PA  Authorized by: Alfredo Blake MD     Consent:     Consent obtained:  Verbal    Consent given by:  Patient  Anesthesia (see MAR for exact dosages):     Anesthesia method:  Local infiltration    Local anesthetic:  Lidocaine 1% WITH epi  Laceration details:     Location:  Lip    Lip location:  Lower interior lip    Length (cm):  1  Repair type:     Repair type:   Intermediate  Pre-procedure details:     Preparation:  Patient was prepped and draped in usual sterile fashion  Exploration:     Hemostasis achieved with:  Epinephrine and direct pressure  Treatment:     Area cleansed with:  Hibiclens and saline    Amount of cleaning:  Standard    Irrigation solution:  Sterile saline    Irrigation method:  Tap  Mucous membrane repair:     Suture size:  5-0    Suture material:  Vicryl    Suture technique:  Simple interrupted    Number of sutures:  3  Skin repair:     Repair method:  Sutures    Suture size:  6-0    Suture material:  Nylon    Suture technique:  Simple interrupted    Number of sutures:  2  Approximation:     Approximation:  Close  Post-procedure details:     Dressing:  Open (no dressing)    Patient tolerance of procedure:  Tolerated well, no immediate complications            PROGRESS AND CONSULTS     4:47 AM  Reviewed pt's history and workup with Dr. Blake.  After a bedside evaluation; Dr Blake agrees with the plan of care  5:24 AM  Laceration has been repaired. Pt will be discharged.       MEDICAL DECISION MAKING  Results were reviewed/discussed with the patient and they were also made aware of online access. Pt also made aware that some labs, such as cultures, will not be resulted during ER visit and follow up with PMD is necessary.     MDM         DIAGNOSIS  Final diagnoses:   Laceration of lower lip, initial encounter       DISPOSITION  DISCHARGE    Patient discharged in stable condition.    Reviewed implications of results, diagnosis, meds, responsibility to follow up, warning signs and symptoms of possible worsening, potential complications and reasons to return to ER.     Patient/Family voiced understanding of above instructions.    Discussed plan for discharge, as there is no emergent indication for admission. Patient referred to primary care provider for BP management due to today's BP. Pt/family is agreeable and understands need for follow up and repeat  testing.  Pt is aware that discharge does not mean that nothing is wrong but it indicates no emergency is present that requires admission and they must continue care with follow-up as given below or physician of their choice.     FOLLOW-UP  Chi Pena MD  [id number]  [address] [address]  [phone]    Schedule an appointment as soon as possible for a visit in 1 week  For suture removal         Medication List      Changed    escitalopram 20 MG tablet  Commonly known as:  LEXAPRO  Take 1 tablet by mouth Daily.  What changed:  when to take this     levothyroxine 112 MCG tablet  Commonly known as:  SYNTHROID, LEVOTHROID  Take 1 tablet by mouth Daily.  What changed:  when to take this              Latest Documented Vital Signs:  As of 5:51 AM  BP- 120/82 HR- 77 Temp- 96.4 °F (35.8 °C) (Tympanic) O2 sat- 96%    --  Documentation assistance provided by trina Beasley for Negro Rolle PA-C.  Information recorded by the scribe was done at my direction and has been verified and validated by me.     Robin Beasley  02/20/19 0525       Negro Rolle PA  02/20/19 0551

## 2019-02-20 NOTE — ED NOTES
"Pt fell today. Pt states \"I hit my teeth through my lip\". Pt holding pressure to lip area. Pt with laceration to lip. Pt not on blood thinners. Pt denies LOC.      Bri Evans, RN  02/20/19 0434    "

## 2019-06-05 ENCOUNTER — OFFICE VISIT (OUTPATIENT)
Dept: INTERNAL MEDICINE | Facility: CLINIC | Age: 64
End: 2019-06-05

## 2019-06-05 VITALS
BODY MASS INDEX: 21.9 KG/M2 | SYSTOLIC BLOOD PRESSURE: 122 MMHG | WEIGHT: 116 LBS | TEMPERATURE: 98.5 F | DIASTOLIC BLOOD PRESSURE: 60 MMHG | HEART RATE: 71 BPM | OXYGEN SATURATION: 98 % | HEIGHT: 61 IN

## 2019-06-05 DIAGNOSIS — M06.09 RHEUMATOID ARTHRITIS OF MULTIPLE SITES WITH NEGATIVE RHEUMATOID FACTOR (HCC): ICD-10-CM

## 2019-06-05 DIAGNOSIS — M15.4 EROSIVE OSTEOARTHRITIS: ICD-10-CM

## 2019-06-05 DIAGNOSIS — F32.0 CURRENT MILD EPISODE OF MAJOR DEPRESSIVE DISORDER WITHOUT PRIOR EPISODE (HCC): ICD-10-CM

## 2019-06-05 DIAGNOSIS — E03.9 HYPOTHYROIDISM (ACQUIRED): Primary | ICD-10-CM

## 2019-06-05 PROCEDURE — 99396 PREV VISIT EST AGE 40-64: CPT | Performed by: NURSE PRACTITIONER

## 2019-06-05 NOTE — ASSESSMENT & PLAN NOTE
Psychological condition is improving with treatment.  Medication changes per orders.  Psychological condition  will be reassessed at the next regular appointment.    Will try to wean off Lexapro.    We discussed taper.

## 2019-06-05 NOTE — ASSESSMENT & PLAN NOTE
Chronic, progressing  Continue to follow up with Dr Galeano and ortho   Continue yearly ophthalmic exams

## 2019-06-05 NOTE — PATIENT INSTRUCTIONS
Diet:    • Eat vegetables, fruits, whole grain, low-fat dairy, poultry, fish, beans, nontropical vegetable oils, and nuts, but avoid red meat (i.e., Mediterranean-style diet, DASH [Dietary Approaches to Stop Hypertension] diet).  • Limit sugary drinks and sweets.  • Limit saturated and trans fat to 5% to 6% of calories.  • Limit sodium intake to 2,400 mg daily (about one teaspoon table salt [kosher/sea salt have less sodium per teaspoon]).  Weight loss / Calorie Counting Apps:    • Lose It!   • Visitar Pal   • Works great when you try it with a partner/ friend  Exercise:   • Engage in moderate-to-vigorous aerobic activity for at least 40 minutes (on average) three to four times each week.  Wearables:   • Activity tracker   • Step tracker   Skin Care:   • Use sun screen SPF >30 daily  • Dermatologist for skin check regularly  Bone Health:   • https://www.nof.org/patients/treatment/nutrition/

## 2019-06-05 NOTE — PROGRESS NOTES
Name: Stacey Meyer  :  1955    Subjective:     CC: Annual exam      Stacey Meyer is a 63 y.o. female patient of Chi Pena MD who is here for annual exam except GYN exam.   She states for her arthritis issues, she does very well.  She had breast reduction in the last year which she states has taken considerable strain off her neck and back. She has had chronic issues with back since teenager and sees chiropracter on regular basis and controls back issues.      Hypothyroidism   This is a chronic problem. The current episode started more than 1 year ago (20 years ). The problem occurs constantly. The problem has been unchanged. Associated symptoms include arthralgias. Pertinent negatives include no chest pain, coughing, fatigue or weakness. Nothing aggravates the symptoms. Treatments tried: synthroid. The treatment provided significant relief.   Osteoarthritis   Presents for follow-up visit. She complains of pain and stiffness. The symptoms have been stable. Affected locations include the right knee, right hip, left hip, left knee, right PIP, left PIP, right DIP and left DIP. Her pain is at a severity of 4/10. Pertinent negatives include no fatigue or weight loss. Her past medical history is significant for osteoarthritis. Treatment side effects: She states that methotrexate initiation was discussed but she has decided NOT to try RX.    Depression   Visit Type: follow-up  Patient presents with the following symptoms: feelings of hopelessness.  Patient is not experiencing: shortness of breath and weight loss.  Severity: mild   Sleep quality: fair  Nighttime awakenings: occasional  Compliance with medications:  %  Treatment side effects: States that overall, depression is much improved and would like to try to decrease and possibly wean off Rx.         Health Maintenance:    , 2 children adopted  Quit smoking when 30 yoa  Hx of drinking, cut out EtOH a few months ago  "    Exercise: states had slacked off after had hip done, but now using elipitcal and about to start palates.  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    Past Medical History:   Diagnosis Date   • Anxiety    • Depression    • Erosive osteoarthritis     Dr Galeano   • History of peptic ulcer    • History of transfusion     AFTER HIP REPLACEMENT    • Hypertension    • Osteoarthritis    • PONV (postoperative nausea and vomiting)    • Thyroid disease     HYPOTHYROID       Past Surgical History:   Procedure Laterality Date   • BILATERAL BREAST REDUCTION Bilateral 2018    Procedure: BREAST REDUCTION BILATERAL, NEW IMAGE;  Surgeon: Myranda Perea MD;  Location: [facility];  Service: New Image   • BUNIONECTOMY Left    • COLONOSCOPY         • ECTOPIC PREGNANCY SURGERY Bilateral     2 SEPARATE SURGERIES   • ENDOSCOPY N/A 2016    Procedure: ESOPHAGOGASTRODUODENOSCOPY WITH COLD BIOPSIES;  Surgeon: Danny Costa MD;  Location: [facility] ENDOSCOPY;  Service:    • ENDOSCOPY N/A 2016    Procedure: ESOPHAGOGASTRODUODENOSCOPY;  Surgeon: Danny Costa MD;  Location: [facility] ENDOSCOPY;  Service:    • FERTILITY SURGERY     • FOOT SURGERY Bilateral     \"THEY BROKE MY 5TH TOE ON BOTH SIDES AND STRIGHTENED THEM\"    • HAMMER TOE REPAIR Bilateral    • TONSILLECTOMY     • TOTAL HIP ARTHROPLASTY Right 2016    Procedure: RIGHT TOTAL HIP ARTHROPLASTY ANTERIOR APPROACH;  Surgeon: Eric Clayton MD;  Location: [facility] OR;  Service:        Family History   Problem Relation Age of Onset   • Liver disease Mother          at 69 yoa    • Autoimmune disease Mother    • Coronary artery disease " Father    • Heart disease Father    • Stroke Maternal Aunt    • Obesity Brother    • Malig Hyperthermia Neg Hx        Social History     Socioeconomic History   • Marital status:      Spouse name: Not on file   • Number of children: 2   • Years of education: bachelor's degree   • Highest education level: Not on file   Occupational History   • Occupation: teacher   Tobacco Use   • Smoking status: Former Smoker     Packs/day: 1.00     Years: 10.00     Pack years: 10.00     Types: Cigarettes     Last attempt to quit:      Years since quittin.4   • Smokeless tobacco: Never Used   Substance and Sexual Activity   • Alcohol use: Yes     Alcohol/week: 2.4 oz     Types: 4 Glasses of wine per week     Comment: Quit drinking 2019    • Drug use: No   • Sexual activity: Defer     Partners: Male       Most Recent Immunizations   Administered Date(s) Administered   • Influenza TIV (IM) 10/01/2016   • Tdap 2017         Review of Systems:   Review of Systems   Constitutional: Negative for fatigue and weight loss.   HENT: Negative.    Eyes: Negative for visual disturbance.   Respiratory: Negative for cough and shortness of breath.    Cardiovascular: Negative for chest pain and leg swelling.   Gastrointestinal: Negative.    Endocrine: Negative for cold intolerance and heat intolerance.   Musculoskeletal: Positive for arthralgias and stiffness.   Neurological: Negative.  Negative for weakness.   Hematological: Negative.    Psychiatric/Behavioral: Negative.          Objective:      Physical Exam:   Physical Exam   Constitutional: She is oriented to person, place, and time. She appears well-developed and well-nourished. She is cooperative.   HENT:   Head: Normocephalic.   Right Ear: External ear normal.   Left Ear: External ear normal.   Nose: Nose normal.   Mouth/Throat: Oropharynx is clear and moist.   Eyes: Conjunctivae and EOM are normal. Pupils are equal, round, and reactive to light.   Neck: Normal range  "of motion. Neck supple. No thyromegaly present.   Appears euthyroid   Cardiovascular: Normal rate, regular rhythm, S1 normal, S2 normal, normal heart sounds, intact distal pulses and normal pulses.   No murmur heard.  Pulmonary/Chest: Effort normal and breath sounds normal. She exhibits no deformity.   Equal, Unlabored   Abdominal: Soft. Normal appearance and bowel sounds are normal. There is no hepatosplenomegaly. There is no tenderness. There is no rebound.   Musculoskeletal:        Right wrist: She exhibits deformity.        Left wrist: She exhibits deformity.   Bilateral knee crepitus without edema     Bilateral ulnar deviation, deformity greater on R    Lymphadenopathy:     She has no cervical adenopathy.     She has no axillary adenopathy.   Neurological: She is alert and oriented to person, place, and time. She has normal strength. No cranial nerve deficit or sensory deficit.   Skin: Skin is warm, dry and intact. Capillary refill takes 2 to 3 seconds.   Psychiatric: She has a normal mood and affect. Her speech is normal and behavior is normal. Judgment and thought content normal. Cognition and memory are normal.   Vitals reviewed.        Vital Signs:     Vitals:    06/05/19 0951 06/05/19 1825   BP: 154/84 122/60   BP Location: Left arm Right arm   Patient Position: Sitting    Cuff Size: Small Adult    Pulse: 71    Temp: 98.5 °F (36.9 °C)    TempSrc: Oral    SpO2: 98%    Weight: 52.6 kg (116 lb)    Height: 154.9 cm (61\")           Results Review:      REVIEWED AND DISCUSSED LAB RESULTS WITH PATIENT      Requested Prescriptions      No prescriptions requested or ordered in this encounter         Current Outpatient Medications:   •  escitalopram (LEXAPRO) 20 MG tablet, Take 1 tablet by mouth Daily. (Patient taking differently: Take 20 mg by mouth Every Night.), Disp: 90 tablet, Rfl: 3  •  hydroxychloroquine (PLAQUENIL) 200 MG tablet, Take 200 mg by mouth Daily. PT HAS APPT WITH MD TOMORROW AND WILL ASK IF NEEDS " "TO HOLD FOR SURGERY, Disp: , Rfl:   •  levothyroxine (SYNTHROID, LEVOTHROID) 112 MCG tablet, Take 1 tablet by mouth Daily. (Patient taking differently: Take 112 mcg by mouth Every Morning.), Disp: 90 tablet, Rfl: 3       Assessment and Plan:        Problem List Items Addressed This Visit        Endocrine    Hypothyroidism (acquired) - Primary     Chronic, stable  Continue current dose  Check TSH and adjust if needed             Musculoskeletal and Integument    Erosive osteoarthritis    Rheumatoid arthritis of multiple sites with negative rheumatoid factor (CMS/HCC)     Chronic, progressing  Continue to follow up with Dr Galeano and ortho   Continue yearly ophthalmic exams             Other    Depression     Psychological condition is improving with treatment.  Medication changes per orders.  Psychological condition  will be reassessed at the next regular appointment.    Will try to wean off Lexapro.    We discussed taper.               Will refill RX as pharmacy requests.     Discussed any change in Rx and discussed visit with patient.  All questions answered.      Return in about 1 year (around 6/5/2020).    Vinny \"Sldae\" Caitlin, APRN   06/05/19    Additional Patient Counseling:       Patient Instructions   Diet:    • Eat vegetables, fruits, whole grain, low-fat dairy, poultry, fish, beans, nontropical vegetable oils, and nuts, but avoid red meat (i.e., Mediterranean-style diet, DASH [Dietary Approaches to Stop Hypertension] diet).  • Limit sugary drinks and sweets.  • Limit saturated and trans fat to 5% to 6% of calories.  • Limit sodium intake to 2,400 mg daily (about one teaspoon table salt [kosher/sea salt have less sodium per teaspoon]).  Weight loss / Calorie Counting Apps:    • Lose It!   • MyFitMobile2Me Pal   • Works great when you try it with a partner/ friend  Exercise:   • Engage in moderate-to-vigorous aerobic activity for at least 40 minutes (on average) three to four times each week.  Wearables: "   • Activity tracker   • Step tracker   Skin Care:   • Use sun screen SPF >30 daily  • Dermatologist for skin check regularly  Bone Health:   • https://www.nof.org/patients/treatment/nutrition/

## 2019-06-26 ENCOUNTER — HOSPITAL ENCOUNTER (OUTPATIENT)
Facility: HOSPITAL | Age: 64
Setting detail: HOSPITAL OUTPATIENT SURGERY
Discharge: HOME | End: 2019-06-26
Attending: INTERNAL MEDICINE | Admitting: INTERNAL MEDICINE

## 2019-06-26 ENCOUNTER — ANESTHESIA (OUTPATIENT)
Dept: GASTROENTEROLOGY | Facility: HOSPITAL | Age: 64
End: 2019-06-26

## 2019-06-26 ENCOUNTER — ON CAMPUS - OUTPATIENT (OUTPATIENT)
Dept: URBAN - METROPOLITAN AREA HOSPITAL 114 | Facility: HOSPITAL | Age: 64
End: 2019-06-26
Payer: COMMERCIAL

## 2019-06-26 ENCOUNTER — ANESTHESIA EVENT (OUTPATIENT)
Dept: GASTROENTEROLOGY | Facility: HOSPITAL | Age: 64
End: 2019-06-26

## 2019-06-26 VITALS
RESPIRATION RATE: 16 BRPM | DIASTOLIC BLOOD PRESSURE: 97 MMHG | HEART RATE: 64 BPM | OXYGEN SATURATION: 98 % | SYSTOLIC BLOOD PRESSURE: 131 MMHG | TEMPERATURE: 97.8 F | BODY MASS INDEX: 22.78 KG/M2 | WEIGHT: 116 LBS | HEIGHT: 60 IN

## 2019-06-26 DIAGNOSIS — D12.2 BENIGN NEOPLASM OF ASCENDING COLON: ICD-10-CM

## 2019-06-26 DIAGNOSIS — Z12.11 ENCOUNTER FOR SCREENING FOR MALIGNANT NEOPLASM OF COLON: ICD-10-CM

## 2019-06-26 DIAGNOSIS — Z12.11 COLON CANCER SCREENING: ICD-10-CM

## 2019-06-26 DIAGNOSIS — D12.5 BENIGN NEOPLASM OF SIGMOID COLON: ICD-10-CM

## 2019-06-26 PROCEDURE — 25010000002 MIDAZOLAM PER 1 MG: Performed by: ANESTHESIOLOGY

## 2019-06-26 PROCEDURE — 45380 COLONOSCOPY AND BIOPSY: CPT | Performed by: INTERNAL MEDICINE

## 2019-06-26 PROCEDURE — 88305 TISSUE EXAM BY PATHOLOGIST: CPT | Performed by: INTERNAL MEDICINE

## 2019-06-26 PROCEDURE — 25010000002 PROPOFOL 10 MG/ML EMULSION: Performed by: NURSE ANESTHETIST, CERTIFIED REGISTERED

## 2019-06-26 RX ORDER — SODIUM CHLORIDE 0.9 % (FLUSH) 0.9 %
3-10 SYRINGE (ML) INJECTION AS NEEDED
Status: DISCONTINUED | OUTPATIENT
Start: 2019-06-26 | End: 2019-06-26 | Stop reason: HOSPADM

## 2019-06-26 RX ORDER — MIDAZOLAM HYDROCHLORIDE 1 MG/ML
2 INJECTION INTRAMUSCULAR; INTRAVENOUS ONCE
Status: COMPLETED | OUTPATIENT
Start: 2019-06-26 | End: 2019-06-26

## 2019-06-26 RX ORDER — PROPOFOL 10 MG/ML
VIAL (ML) INTRAVENOUS AS NEEDED
Status: DISCONTINUED | OUTPATIENT
Start: 2019-06-26 | End: 2019-06-26 | Stop reason: SURG

## 2019-06-26 RX ORDER — SODIUM CHLORIDE 0.9 % (FLUSH) 0.9 %
3 SYRINGE (ML) INJECTION EVERY 12 HOURS SCHEDULED
Status: DISCONTINUED | OUTPATIENT
Start: 2019-06-26 | End: 2019-06-26 | Stop reason: HOSPADM

## 2019-06-26 RX ORDER — PROPOFOL 10 MG/ML
VIAL (ML) INTRAVENOUS CONTINUOUS PRN
Status: DISCONTINUED | OUTPATIENT
Start: 2019-06-26 | End: 2019-06-26 | Stop reason: SURG

## 2019-06-26 RX ORDER — LIDOCAINE HYDROCHLORIDE 20 MG/ML
INJECTION, SOLUTION INFILTRATION; PERINEURAL AS NEEDED
Status: DISCONTINUED | OUTPATIENT
Start: 2019-06-26 | End: 2019-06-26 | Stop reason: SURG

## 2019-06-26 RX ORDER — SODIUM CHLORIDE, SODIUM LACTATE, POTASSIUM CHLORIDE, CALCIUM CHLORIDE 600; 310; 30; 20 MG/100ML; MG/100ML; MG/100ML; MG/100ML
30 INJECTION, SOLUTION INTRAVENOUS CONTINUOUS PRN
Status: DISCONTINUED | OUTPATIENT
Start: 2019-06-26 | End: 2019-06-26 | Stop reason: HOSPADM

## 2019-06-26 RX ADMIN — LIDOCAINE HYDROCHLORIDE 60 MG: 20 INJECTION, SOLUTION INFILTRATION; PERINEURAL at 08:59

## 2019-06-26 RX ADMIN — SODIUM CHLORIDE, POTASSIUM CHLORIDE, SODIUM LACTATE AND CALCIUM CHLORIDE 30 ML/HR: 600; 310; 30; 20 INJECTION, SOLUTION INTRAVENOUS at 08:27

## 2019-06-26 RX ADMIN — MIDAZOLAM 2 MG: 1 INJECTION INTRAMUSCULAR; INTRAVENOUS at 08:35

## 2019-06-26 RX ADMIN — PROPOFOL 50 MG: 10 INJECTION, EMULSION INTRAVENOUS at 08:59

## 2019-06-26 RX ADMIN — PROPOFOL 160 MCG/KG/MIN: 10 INJECTION, EMULSION INTRAVENOUS at 08:59

## 2019-06-26 NOTE — ANESTHESIA PREPROCEDURE EVALUATION
Anesthesia Evaluation     Patient summary reviewed   history of anesthetic complications: PONV  NPO Solid Status: > 8 hours  NPO Liquid Status: > 8 hours           Airway   Mallampati: III  TM distance: >3 FB  Dental      Pulmonary    Cardiovascular     Rhythm: regular  Rate: normal        Neuro/Psych  GI/Hepatic/Renal/Endo    (+)   hypothyroidism,     Musculoskeletal     Abdominal    Substance History      OB/GYN          Other   (+) arthritis                     Anesthesia Plan    ASA 2     MAC   total IV anesthesia  Anesthetic plan, all risks, benefits, and alternatives have been provided, discussed and informed consent has been obtained with: patient.       negative...

## 2019-06-26 NOTE — ANESTHESIA POSTPROCEDURE EVALUATION
"Patient: Stacey Meyer    Procedure Summary     Date:  06/26/19 Room / Location:   [facility]    Anesthesia Start:  0854 Anesthesia Stop:  0920    Procedure:  COLONOSCOPY WITH COLD POLYPECTOMY x 2 (N/A ) Diagnosis:      Surgeon:  Danny Costa MD Provider:  Aura Crowley MD    Anesthesia Type:  MAC ASA Status:  2          Anesthesia Type: MAC  Last vitals  BP   107/73 (06/26/19 0919)   Temp       Pulse   68 (06/26/19 0919)   Resp   16 (06/26/19 0817)     SpO2   100 % (06/26/19 0919)     Post Anesthesia Care and Evaluation    Patient location during evaluation: bedside  Patient participation: complete - patient participated  Level of consciousness: awake and alert  Pain management: adequate  Airway patency: patent  Anesthetic complications: No anesthetic complications  PONV Status: none  Cardiovascular status: acceptable  Respiratory status: acceptable  Hydration status: acceptable    Comments: /73 (BP Location: Left arm, Patient Position: Lying)   Pulse 68   Resp 16   Ht 152.4 cm (60\")   Wt 52.6 kg (116 lb)   SpO2 100%   BMI 22.65 kg/m²         "

## 2019-06-28 LAB
CYTO UR: NORMAL
LAB AP CASE REPORT: NORMAL
PATH REPORT.FINAL DX SPEC: NORMAL
PATH REPORT.GROSS SPEC: NORMAL

## 2019-11-18 RX ORDER — ESCITALOPRAM OXALATE 20 MG/1
TABLET ORAL
Qty: 90 TABLET | Refills: 3 | Status: SHIPPED | OUTPATIENT
Start: 2019-11-18 | End: 2019-12-11

## 2019-12-10 NOTE — PROGRESS NOTES
Name: Stacey Meyer  :  1955    Subjective:      Chief Complaint   Patient presents with   • Anxiety   • Arthritis        Stacey Meyer is a 64 y.o. female patient of Chi Pena MD who is here for flying anxiety.     She was last seen by me on 19 for her annual exam.  She had colonoscopy with Dr Costa on 19 for colon cancer screen 2 adenomatous polyps removed. Will have next colonoscopy in 5 years.     She has anxiety and depression and she discontinued her lexapro over the summer. She has been doing very well off the medication. Travelling with 2 year old grand daughter. Plans to [place] .  Fear of flying. She is tearful when discussing it.  No event occurred in the past.      Going to rheum (Dr Alessandro DE LA ROSA)  and plans to start taking Imbrel for RA.     Doing very well with her steps and pilates.     I have reviewed the patient's medical history in detail and updated the computerized patient record.    Past Medical History:   Diagnosis Date   • Anxiety    • Depression    • Erosive osteoarthritis     Dr Galeano   • History of peptic ulcer    • History of transfusion     AFTER HIP REPLACEMENT    • Hypertension    • Osteoarthritis    • PONV (postoperative nausea and vomiting)    • Thyroid disease     HYPOTHYROID       Past Surgical History:   Procedure Laterality Date   • BILATERAL BREAST REDUCTION Bilateral 2018    Procedure: BREAST REDUCTION BILATERAL, NEW IMAGE;  Surgeon: Myranda Perea MD;  Location: [facility] OR;  Service: New Image   • BUNIONECTOMY Left    • COLONOSCOPY         • COLONOSCOPY N/A 2019    Procedure: COLONOSCOPY WITH COLD POLYPECTOMY x 2;  Surgeon: Danny Costa MD;  Location: [facility] ENDOSCOPY;  Service: Gastroenterology   • ECTOPIC PREGNANCY SURGERY Bilateral     2 SEPARATE SURGERIES   • ENDOSCOPY N/A 2016    Procedure: ESOPHAGOGASTRODUODENOSCOPY WITH COLD BIOPSIES;  Surgeon: Danny Costa MD;  Location:  "[facility] ENDOSCOPY;  Service:    • ENDOSCOPY N/A 2016    Procedure: ESOPHAGOGASTRODUODENOSCOPY;  Surgeon: Danny Costa MD;  Location: [facility] ENDOSCOPY;  Service:    • FERTILITY SURGERY     • FOOT SURGERY Bilateral     \"THEY BROKE MY 5TH TOE ON BOTH SIDES AND STRIGHTENED THEM\"    • HAMMER TOE REPAIR Bilateral    • TONSILLECTOMY     • TOTAL HIP ARTHROPLASTY Right 2016    Procedure: RIGHT TOTAL HIP ARTHROPLASTY ANTERIOR APPROACH;  Surgeon: Eric Clayton MD;  Location: [facility] OR;  Service:        Family History   Problem Relation Age of Onset   • Liver disease Mother          at 69 yoa    • Autoimmune disease Mother    • Coronary artery disease Father    • Heart disease Father    • Stroke Maternal Aunt    • Obesity Brother    • Malig Hyperthermia Neg Hx        Social History     Socioeconomic History   • Marital status:      Spouse name: Not on file   • Number of children: 2   • Years of education: bachelor's degree   • Highest education level: Not on file   Occupational History   • Occupation: teacher   Tobacco Use   • Smoking status: Former Smoker     Packs/day: 1.00     Years: 10.00     Pack years: 10.00     Types: Cigarettes     Last attempt to quit:      Years since quittin.9   • Smokeless tobacco: Never Used   Substance and Sexual Activity   • Alcohol use: Yes     Alcohol/week: 4.0 standard drinks     Types: 4 Glasses of wine per week     Comment: Quit drinking 2019    • Drug use: No   • Sexual activity: Defer     Partners: Male       Most Recent Immunizations   Administered Date(s) Administered   • Influenza TIV (IM) 10/01/2016   • Tdap 2017         Review of Systems:   Review of Systems   Cardiovascular: Negative for chest pain and palpitations.   Psychiatric/Behavioral: The patient is nervous/anxious.          Objective:      Physical Exam:   Physical Exam   Constitutional: She is oriented to person, place, and time. She appears well-developed. She is cooperative. " "  Neck: No thyromegaly present.   Cardiovascular: Normal rate, regular rhythm, normal heart sounds, intact distal pulses and normal pulses.   Pulmonary/Chest: Effort normal and breath sounds normal. She exhibits no deformity.   Equal, Unlabored   Neurological: She is alert and oriented to person, place, and time.   Skin: Capillary refill takes 2 to 3 seconds.   Psychiatric: She has a normal mood and affect. Her speech is normal and behavior is normal. Thought content normal. Cognition and memory are normal.   Vitals reviewed.        Vital Signs:  Vitals:    12/11/19 0936 12/11/19 0957   BP: 178/100 132/80   BP Location:  Left arm   Pulse: 73    SpO2: 97%    Weight: 50.7 kg (111 lb 12.8 oz)    Height: 152.4 cm (60\")      Body mass index is 21.83 kg/m².          Requested Prescriptions     Signed Prescriptions Disp Refills   • LORazepam (ATIVAN) 1 MG tablet 8 tablet 0     Sig: Take 1 tablet by mouth Every 8 (Eight) Hours As Needed for Anxiety.         Current Outpatient Medications:   •  amoxicillin (AMOXIL) 500 MG capsule, Take 1,000 mg by mouth 2 (Two) Times a Day., Disp: , Rfl:   •  hydroxychloroquine (PLAQUENIL) 200 MG tablet, Take 200 mg by mouth Daily. PT HAS APPT WITH MD TOMORROW AND WILL ASK IF NEEDS TO HOLD FOR SURGERY, Disp: , Rfl:   •  levothyroxine (SYNTHROID, LEVOTHROID) 112 MCG tablet, Take 1 tablet by mouth Daily. (Patient taking differently: Take 112 mcg by mouth Every Morning.), Disp: 90 tablet, Rfl: 3  •  prednisoLONE 5 MG (21) tablet therapy pack, Take  by mouth., Disp: , Rfl:   •  LORazepam (ATIVAN) 1 MG tablet, Take 1 tablet by mouth Every 8 (Eight) Hours As Needed for Anxiety., Disp: 8 tablet, Rfl: 0       Assessment and Plan:        Problem List Items Addressed This Visit     None      Visit Diagnoses     Anxiety    -  Primary    Relevant Medications    LORazepam (ATIVAN) 1 MG tablet        The patient has read and signed the [facility] Controlled Substance Contract.  I will continue to see " "patient for regular follow up appointments. This a a new medication.  Will keep rx in safe place.   The patient is aware of the potential for addiction and dependence.    Discussed any change in Rx and discussed visit with patient.  All questions answered.      Has next appointment with me next summer.     Vinny \"Slade\" Caitlin, JENIFFER   12/11/19    Dragon disclaimer:   Much of this encounter note is an electronic transcription/translation of spoken language to printed text. The electronic translation of spoken language may permit erroneous, or at times, nonsensical words or phrases to be inadvertently transcribed; Although I have reviewed the note for such errors, some may still exist.     Additional Patient Counseling:       Patient Instructions   Take 1/2 of lorazepam 30 minutes before the flight.     Do test dose this weekend.    Let me know if you have any questions.    Have a great [place] and enjoy [place]!     "

## 2019-12-11 ENCOUNTER — OFFICE VISIT (OUTPATIENT)
Dept: INTERNAL MEDICINE | Facility: CLINIC | Age: 64
End: 2019-12-11

## 2019-12-11 VITALS
BODY MASS INDEX: 21.95 KG/M2 | SYSTOLIC BLOOD PRESSURE: 132 MMHG | HEIGHT: 60 IN | HEART RATE: 73 BPM | OXYGEN SATURATION: 97 % | WEIGHT: 111.8 LBS | DIASTOLIC BLOOD PRESSURE: 80 MMHG

## 2019-12-11 DIAGNOSIS — F41.9 ANXIETY: Primary | ICD-10-CM

## 2019-12-11 PROCEDURE — 99213 OFFICE O/P EST LOW 20 MIN: CPT | Performed by: NURSE PRACTITIONER

## 2019-12-11 RX ORDER — LORAZEPAM 1 MG/1
1 TABLET ORAL EVERY 8 HOURS PRN
Qty: 8 TABLET | Refills: 0 | Status: SHIPPED | OUTPATIENT
Start: 2019-12-11 | End: 2020-02-12

## 2019-12-11 RX ORDER — AMOXICILLIN 500 MG/1
1000 CAPSULE ORAL 2 TIMES DAILY
COMMUNITY
End: 2020-01-19 | Stop reason: HOSPADM

## 2019-12-11 NOTE — PATIENT INSTRUCTIONS
Take 1/2 of lorazepam 30 minutes before the flight.     Do test dose this weekend.    Let me know if you have any questions.    Have a great [place] and enjoy [place]!

## 2020-02-09 ENCOUNTER — HOSPITAL ENCOUNTER (EMERGENCY)
Facility: HOSPITAL | Age: 65
Discharge: HOME | End: 2020-02-09
Attending: EMERGENCY MEDICINE | Admitting: EMERGENCY MEDICINE

## 2020-02-09 ENCOUNTER — APPOINTMENT (OUTPATIENT)
Dept: GENERAL RADIOLOGY | Facility: HOSPITAL | Age: 65
End: 2020-02-09

## 2020-02-09 VITALS
DIASTOLIC BLOOD PRESSURE: 107 MMHG | SYSTOLIC BLOOD PRESSURE: 178 MMHG | WEIGHT: 111 LBS | HEART RATE: 70 BPM | RESPIRATION RATE: 18 BRPM | BODY MASS INDEX: 20.43 KG/M2 | OXYGEN SATURATION: 95 % | HEIGHT: 62 IN | TEMPERATURE: 97.8 F

## 2020-02-09 DIAGNOSIS — J10.1 INFLUENZA A: Primary | ICD-10-CM

## 2020-02-09 DIAGNOSIS — I10 HYPERTENSION, UNSPECIFIED TYPE: ICD-10-CM

## 2020-02-09 LAB
ALBUMIN SERPL-MCNC: 4 G/DL (ref 3.5–5.2)
ALBUMIN/GLOB SERPL: 1.9 G/DL
ALP SERPL-CCNC: 69 U/L (ref 39–117)
ALT SERPL W P-5'-P-CCNC: 21 U/L (ref 1–33)
ANION GAP SERPL CALCULATED.3IONS-SCNC: 15.9 MMOL/L (ref 5–15)
AST SERPL-CCNC: 32 U/L (ref 1–32)
B PARAPERT DNA SPEC QL NAA+PROBE: NOT DETECTED
B PERT DNA SPEC QL NAA+PROBE: NOT DETECTED
BASOPHILS # BLD AUTO: 0.02 10*3/MM3 (ref 0–0.2)
BASOPHILS NFR BLD AUTO: 0.5 % (ref 0–1.5)
BILIRUB SERPL-MCNC: 0.2 MG/DL (ref 0.2–1.2)
BUN BLD-MCNC: 12 MG/DL (ref 8–23)
BUN/CREAT SERPL: 26.7 (ref 7–25)
C PNEUM DNA NPH QL NAA+NON-PROBE: NOT DETECTED
CALCIUM SPEC-SCNC: 8.9 MG/DL (ref 8.6–10.5)
CHLORIDE SERPL-SCNC: 98 MMOL/L (ref 98–107)
CO2 SERPL-SCNC: 24.1 MMOL/L (ref 22–29)
CREAT BLD-MCNC: 0.45 MG/DL (ref 0.57–1)
D-LACTATE SERPL-SCNC: 2.3 MMOL/L (ref 0.5–2)
DEPRECATED RDW RBC AUTO: 49.6 FL (ref 37–54)
EOSINOPHIL # BLD AUTO: 0.02 10*3/MM3 (ref 0–0.4)
EOSINOPHIL NFR BLD AUTO: 0.5 % (ref 0.3–6.2)
ERYTHROCYTE [DISTWIDTH] IN BLOOD BY AUTOMATED COUNT: 14.8 % (ref 12.3–15.4)
FLUAV H1 2009 PAND RNA NPH QL NAA+PROBE: DETECTED
FLUAV H1 HA GENE NPH QL NAA+PROBE: NOT DETECTED
FLUAV H3 RNA NPH QL NAA+PROBE: NOT DETECTED
FLUBV RNA ISLT QL NAA+PROBE: NOT DETECTED
GFR SERPL CREATININE-BSD FRML MDRD: 141 ML/MIN/1.73
GLOBULIN UR ELPH-MCNC: 2.1 GM/DL
GLUCOSE BLD-MCNC: 88 MG/DL (ref 65–99)
HADV DNA SPEC NAA+PROBE: NOT DETECTED
HCOV 229E RNA SPEC QL NAA+PROBE: NOT DETECTED
HCOV HKU1 RNA SPEC QL NAA+PROBE: NOT DETECTED
HCOV NL63 RNA SPEC QL NAA+PROBE: NOT DETECTED
HCOV OC43 RNA SPEC QL NAA+PROBE: NOT DETECTED
HCT VFR BLD AUTO: 36.7 % (ref 34–46.6)
HGB BLD-MCNC: 12.4 G/DL (ref 12–15.9)
HMPV RNA NPH QL NAA+NON-PROBE: NOT DETECTED
HOLD SPECIMEN: NORMAL
HPIV1 RNA SPEC QL NAA+PROBE: NOT DETECTED
HPIV2 RNA SPEC QL NAA+PROBE: NOT DETECTED
HPIV3 RNA NPH QL NAA+PROBE: NOT DETECTED
HPIV4 P GENE NPH QL NAA+PROBE: NOT DETECTED
IMM GRANULOCYTES # BLD AUTO: 0.01 10*3/MM3 (ref 0–0.05)
IMM GRANULOCYTES NFR BLD AUTO: 0.2 % (ref 0–0.5)
LYMPHOCYTES # BLD AUTO: 1.37 10*3/MM3 (ref 0.7–3.1)
LYMPHOCYTES NFR BLD AUTO: 31.6 % (ref 19.6–45.3)
M PNEUMO IGG SER IA-ACNC: NOT DETECTED
MCH RBC QN AUTO: 31.2 PG (ref 26.6–33)
MCHC RBC AUTO-ENTMCNC: 33.8 G/DL (ref 31.5–35.7)
MCV RBC AUTO: 92.2 FL (ref 79–97)
MONOCYTES # BLD AUTO: 0.42 10*3/MM3 (ref 0.1–0.9)
MONOCYTES NFR BLD AUTO: 9.7 % (ref 5–12)
NEUTROPHILS # BLD AUTO: 2.49 10*3/MM3 (ref 1.7–7)
NEUTROPHILS NFR BLD AUTO: 57.5 % (ref 42.7–76)
NRBC BLD AUTO-RTO: 0 /100 WBC (ref 0–0.2)
PLATELET # BLD AUTO: 236 10*3/MM3 (ref 140–450)
PMV BLD AUTO: 8.8 FL (ref 6–12)
POTASSIUM BLD-SCNC: 3.8 MMOL/L (ref 3.5–5.2)
PROCALCITONIN SERPL-MCNC: 0.06 NG/ML (ref 0.1–0.25)
PROT SERPL-MCNC: 6.1 G/DL (ref 6–8.5)
RBC # BLD AUTO: 3.98 10*6/MM3 (ref 3.77–5.28)
RHINOVIRUS RNA SPEC NAA+PROBE: NOT DETECTED
RSV RNA NPH QL NAA+NON-PROBE: NOT DETECTED
SODIUM BLD-SCNC: 138 MMOL/L (ref 136–145)
TROPONIN T SERPL-MCNC: <0.01 NG/ML (ref 0–0.03)
WBC NRBC COR # BLD: 4.33 10*3/MM3 (ref 3.4–10.8)

## 2020-02-09 PROCEDURE — 87040 BLOOD CULTURE FOR BACTERIA: CPT | Performed by: EMERGENCY MEDICINE

## 2020-02-09 PROCEDURE — 93010 ELECTROCARDIOGRAM REPORT: CPT | Performed by: INTERNAL MEDICINE

## 2020-02-09 PROCEDURE — 99284 EMERGENCY DEPT VISIT MOD MDM: CPT

## 2020-02-09 PROCEDURE — 84145 PROCALCITONIN (PCT): CPT | Performed by: EMERGENCY MEDICINE

## 2020-02-09 PROCEDURE — 80053 COMPREHEN METABOLIC PANEL: CPT | Performed by: EMERGENCY MEDICINE

## 2020-02-09 PROCEDURE — 93005 ELECTROCARDIOGRAM TRACING: CPT | Performed by: EMERGENCY MEDICINE

## 2020-02-09 PROCEDURE — 84484 ASSAY OF TROPONIN QUANT: CPT | Performed by: EMERGENCY MEDICINE

## 2020-02-09 PROCEDURE — 71045 X-RAY EXAM CHEST 1 VIEW: CPT

## 2020-02-09 PROCEDURE — 85025 COMPLETE CBC W/AUTO DIFF WBC: CPT | Performed by: EMERGENCY MEDICINE

## 2020-02-09 PROCEDURE — 83605 ASSAY OF LACTIC ACID: CPT | Performed by: EMERGENCY MEDICINE

## 2020-02-09 PROCEDURE — 0100U HC BIOFIRE FILMARRAY RESP PANEL 2: CPT | Performed by: EMERGENCY MEDICINE

## 2020-02-09 PROCEDURE — 94640 AIRWAY INHALATION TREATMENT: CPT

## 2020-02-09 PROCEDURE — 25010000002 METHYLPREDNISOLONE PER 125 MG: Performed by: EMERGENCY MEDICINE

## 2020-02-09 PROCEDURE — 96374 THER/PROPH/DIAG INJ IV PUSH: CPT

## 2020-02-09 PROCEDURE — 96375 TX/PRO/DX INJ NEW DRUG ADDON: CPT

## 2020-02-09 RX ORDER — LABETALOL HYDROCHLORIDE 5 MG/ML
20 INJECTION, SOLUTION INTRAVENOUS ONCE
Status: COMPLETED | OUTPATIENT
Start: 2020-02-09 | End: 2020-02-09

## 2020-02-09 RX ORDER — OSELTAMIVIR PHOSPHATE 75 MG/1
75 CAPSULE ORAL EVERY 12 HOURS
Qty: 10 CAPSULE | Refills: 0 | Status: SHIPPED | OUTPATIENT
Start: 2020-02-09 | End: 2020-03-10

## 2020-02-09 RX ORDER — METOPROLOL SUCCINATE 50 MG/1
50 TABLET, EXTENDED RELEASE ORAL DAILY
Qty: 30 TABLET | Refills: 0 | Status: SHIPPED | OUTPATIENT
Start: 2020-02-09 | End: 2020-03-10

## 2020-02-09 RX ORDER — ACETAMINOPHEN 500 MG
1000 TABLET ORAL ONCE
Status: COMPLETED | OUTPATIENT
Start: 2020-02-09 | End: 2020-02-09

## 2020-02-09 RX ORDER — METHYLPREDNISOLONE SODIUM SUCCINATE 125 MG/2ML
125 INJECTION, POWDER, LYOPHILIZED, FOR SOLUTION INTRAMUSCULAR; INTRAVENOUS ONCE
Status: COMPLETED | OUTPATIENT
Start: 2020-02-09 | End: 2020-02-09

## 2020-02-09 RX ORDER — OSELTAMIVIR PHOSPHATE 75 MG/1
75 CAPSULE ORAL ONCE
Status: COMPLETED | OUTPATIENT
Start: 2020-02-09 | End: 2020-02-09

## 2020-02-09 RX ORDER — IPRATROPIUM BROMIDE AND ALBUTEROL SULFATE 2.5; .5 MG/3ML; MG/3ML
3 SOLUTION RESPIRATORY (INHALATION) ONCE
Status: COMPLETED | OUTPATIENT
Start: 2020-02-09 | End: 2020-02-09

## 2020-02-09 RX ADMIN — LABETALOL HYDROCHLORIDE 20 MG: 5 INJECTION, SOLUTION INTRAVENOUS at 19:30

## 2020-02-09 RX ADMIN — METHYLPREDNISOLONE SODIUM SUCCINATE 125 MG: 125 INJECTION, POWDER, FOR SOLUTION INTRAMUSCULAR; INTRAVENOUS at 17:43

## 2020-02-09 RX ADMIN — ACETAMINOPHEN 1000 MG: 500 TABLET, FILM COATED ORAL at 19:30

## 2020-02-09 RX ADMIN — OSELTAMIVIR PHOSPHATE 75 MG: 75 CAPSULE ORAL at 19:30

## 2020-02-09 RX ADMIN — IPRATROPIUM BROMIDE AND ALBUTEROL SULFATE 3 ML: 2.5; .5 SOLUTION RESPIRATORY (INHALATION) at 17:35

## 2020-02-09 RX ADMIN — HYDROCODONE POLISTIREX AND CHLORPHENIRAMINE POLISITREX 5 ML: 10; 8 SUSPENSION, EXTENDED RELEASE ORAL at 19:53

## 2020-02-09 RX ADMIN — SODIUM CHLORIDE 500 ML: 9 INJECTION, SOLUTION INTRAVENOUS at 19:30

## 2020-02-09 NOTE — ED PROVIDER NOTES
EMERGENCY DEPARTMENT ENCOUNTER    CHIEF COMPLAINT  Chief Complaint: SOA  History given by: pt  History limited by: none  Room Number: 14/14  PMD: Vinny Tucker III, NP-C      HPI:  Pt is a 64 y.o. female with a hx of RA and hypertension who presents complaining of moderate gradual constant SOA and dry cough that started today and has worsened rapidly over the past 2-3 hours. Pt admits to associated chest  tightness that is worsened with the cough and generalized weakness but denies fever or chills. Pt states her  had similar symptoms and was started on xofluza three days ago after being dx with the flu at [facility] and states he is feeling much better.  Pt states she was started on Humira recently for RA.    Pt states she has been on blood pressure for hypertension in the past but stopped taking it five years ago due to hypotension episodes.    Duration/ Onset/ Timing: today/ gradual/constant  Quality: SOA and dry cough  Intensity/Severity: moderate  Progression: worsened  Associated Symptoms: chest tightness (worse with cough), generalized weakness    PAST MEDICAL HISTORY  Active Ambulatory Problems     Diagnosis Date Noted   • Degeneration of lumbar intervertebral disc 02/29/2016   • Degeneration, intervertebral disc, thoracic 02/29/2016   • Fibromyalgia 06/04/2016   • Depression 06/04/2016   • Transient insomnia 06/04/2016   • Primary osteoarthritis of right hip 06/07/2016   • Familial ligamentous laxity 06/07/2016   • Hypertension 07/11/2016   • OA (osteoarthritis) of hip 07/13/2016   • Acquired hypothyroidism 07/05/2017   • Rheumatoid arthritis of multiple sites with negative rheumatoid factor (CMS/[facility]) 07/05/2017   • Hypothyroidism (acquired) 10/09/2018   • Cystitis 10/09/2018   • Gigantomastia 11/14/2018   • Erosive osteoarthritis      Resolved Ambulatory Problems     Diagnosis Date Noted   • No Resolved Ambulatory Problems     Past Medical History:   Diagnosis Date   • Anxiety    • History of peptic  "ulcer    • History of transfusion    • Osteoarthritis    • PONV (postoperative nausea and vomiting)    • Thyroid disease        PAST SURGICAL HISTORY  Past Surgical History:   Procedure Laterality Date   • BILATERAL BREAST REDUCTION Bilateral 2018    Procedure: BREAST REDUCTION BILATERAL, NEW IMAGE;  Surgeon: Myranda Perea MD;  Location: [facility] OR;  Service: New Image   • BUNIONECTOMY Left    • COLONOSCOPY      2009   • COLONOSCOPY N/A 2019    Procedure: COLONOSCOPY WITH COLD POLYPECTOMY x 2;  Surgeon: Danny Costa MD;  Location: [facility] ENDOSCOPY;  Service: Gastroenterology   • ECTOPIC PREGNANCY SURGERY Bilateral     2 SEPARATE SURGERIES   • ENDOSCOPY N/A 2016    Procedure: ESOPHAGOGASTRODUODENOSCOPY WITH COLD BIOPSIES;  Surgeon: Danny Costa MD;  Location: [facility] ENDOSCOPY;  Service:    • ENDOSCOPY N/A 2016    Procedure: ESOPHAGOGASTRODUODENOSCOPY;  Surgeon: Danny Costa MD;  Location: [facility] ENDOSCOPY;  Service:    • FERTILITY SURGERY     • FOOT SURGERY Bilateral     \"THEY BROKE MY 5TH TOE ON BOTH SIDES AND STRIGHTENED THEM\"    • HAMMER TOE REPAIR Bilateral    • TONSILLECTOMY     • TOTAL HIP ARTHROPLASTY Right 2016    Procedure: RIGHT TOTAL HIP ARTHROPLASTY ANTERIOR APPROACH;  Surgeon: Eric Clayton MD;  Location: [facility] MAIN OR;  Service:        FAMILY HISTORY  Family History   Problem Relation Age of Onset   • Liver disease Mother          at 69 yoa    • Autoimmune disease Mother    • Coronary artery disease Father    • Heart disease Father    • Stroke Maternal Aunt    • Obesity Brother    • Malig Hyperthermia Neg Hx        SOCIAL HISTORY  Social History     Socioeconomic History   • Marital status:      Spouse name: Not on file   • Number of children: 2   • Years of education: bachelor's degree   • Highest education level: Not on file   Occupational History   • Occupation: teacher   Tobacco Use   • Smoking status: Former Smoker     Packs/day: " 1.00     Years: 10.00     Pack years: 10.00     Types: Cigarettes     Last attempt to quit:      Years since quittin.1   • Smokeless tobacco: Never Used   Substance and Sexual Activity   • Alcohol use: Yes     Alcohol/week: 4.0 standard drinks     Types: 4 Glasses of wine per week     Comment: Quit drinking 2019    • Drug use: No   • Sexual activity: Defer     Partners: Male       ALLERGIES  Erythromycin; Metronidazole; Oxycodone-aspirin; Sulfa antibiotics; Tetracycline; and Tetracyclines & related    REVIEW OF SYSTEMS  Review of Systems   Constitutional: Negative for fever.   HENT: Negative for sore throat.    Eyes: Negative.    Respiratory: Positive for cough (dry), chest tightness (worse with cough) and shortness of breath.    Gastrointestinal: Negative for abdominal pain, diarrhea and vomiting.   Genitourinary: Negative for dysuria.   Musculoskeletal: Negative for neck pain.   Skin: Negative for rash.   Allergic/Immunologic: Negative.    Neurological: Positive for weakness (generalized weakness). Negative for numbness and headaches.   Hematological: Negative.    Psychiatric/Behavioral: Negative.    All other systems reviewed and are negative.      PHYSICAL EXAM  ED Triage Vitals   Temp Heart Rate Resp BP SpO2   20 1643 20 1643 20 1643 20 1649 20 1643   97.8 °F (36.6 °C) 117 22 (!) 177/115 97 %      Temp src Heart Rate Source Patient Position BP Location FiO2 (%)   20 1643 20 1643 20 1649 20 1649 --   Tympanic Monitor Sitting Left arm        Physical Exam   Constitutional: She is oriented to person, place, and time. No distress.   HENT:   Head: Normocephalic and atraumatic.   Mouth/Throat: Mucous membranes are dry.   Eyes: Pupils are equal, round, and reactive to light. EOM are normal.   Neck: Normal range of motion. Neck supple.   Cardiovascular: Normal rate, regular rhythm and normal heart sounds.   Pulmonary/Chest: Effort normal. No  respiratory distress. She has wheezes in the right lower field and the left lower field.   Dry cough on exam   Abdominal: Soft. There is no tenderness. There is no rebound and no guarding.   Musculoskeletal: Normal range of motion. She exhibits no edema or tenderness (calf).   Neurological: She is alert and oriented to person, place, and time. She has normal sensation and normal strength. No cranial nerve deficit.   Skin: Skin is warm and dry. No rash noted.   Psychiatric: Mood and affect normal.   Nursing note and vitals reviewed.      LAB RESULTS  Lab Results (last 24 hours)     Procedure Component Value Units Date/Time    CBC & Differential [[id number]] Collected:  02/09/20 1734    Specimen:  Blood Updated:  02/09/20 1752    Narrative:       The following orders were created for panel order CBC & Differential.  Procedure                               Abnormality         Status                     ---------                               -----------         ------                     CBC Auto Differential[[id number]]        Normal              Final result                 Please view results for these tests on the individual orders.    Comprehensive Metabolic Panel [[id number]]  (Abnormal) Collected:  02/09/20 1734    Specimen:  Blood Updated:  02/09/20 1810     Glucose 88 mg/dL      BUN 12 mg/dL      Creatinine 0.45 mg/dL      Sodium 138 mmol/L      Potassium 3.8 mmol/L      Chloride 98 mmol/L      CO2 24.1 mmol/L      Calcium 8.9 mg/dL      Total Protein 6.1 g/dL      Albumin 4.00 g/dL      ALT (SGPT) 21 U/L      AST (SGOT) 32 U/L      Alkaline Phosphatase 69 U/L      Total Bilirubin 0.2 mg/dL      eGFR Non African Amer 141 mL/min/1.73      Globulin 2.1 gm/dL      A/G Ratio 1.9 g/dL      BUN/Creatinine Ratio 26.7     Anion Gap 15.9 mmol/L     Narrative:       GFR Normal >60  Chronic Kidney Disease <60  Kidney Failure <15      Troponin [[id number]]  (Normal) Collected:  02/09/20 1734    Specimen:  Blood Updated:   "02/09/20 1816     Troponin T <0.010 ng/mL     Narrative:       Troponin T Reference Range:  <= 0.03 ng/mL-   Negative for AMI  >0.03 ng/mL-     Abnormal for myocardial necrosis.  Clinicians would have to utilize clinical acumen, EKG, Troponin and serial changes to determine if it is an Acute Myocardial Infarction or myocardial injury due to an underlying chronic condition.       Results may be falsely decreased if patient taking Biotin.      Blood Culture - Blood, Arm, Left [[id number]] Collected:  02/09/20 1734    Specimen:  Blood from Arm, Left Updated:  02/09/20 1740    Blood Culture - Blood, Arm, Left [[id number]] Collected:  02/09/20 1734    Specimen:  Blood from Arm, Left Updated:  02/09/20 1740    Lactic Acid, Plasma [[id number]]  (Abnormal) Collected:  02/09/20 1734    Specimen:  Blood Updated:  02/09/20 1758     Lactate 2.3 mmol/L     Procalcitonin [[id number]]  (Abnormal) Collected:  02/09/20 1734    Specimen:  Blood Updated:  02/09/20 1816     Procalcitonin 0.06 ng/mL     Narrative:       As a Marker for Sepsis (Non-Neonates):   1. <0.5 ng/mL represents a low risk of severe sepsis and/or septic shock.  1. >2 ng/mL represents a high risk of severe sepsis and/or septic shock.    As a Marker for Lower Respiratory Tract Infections that require antibiotic therapy:  PCT on Admission     Antibiotic Therapy             6-12 Hrs later  > 0.5                Strongly Recommended            >0.25 - <0.5         Recommended  0.1 - 0.25           Discouraged                   Remeasure/reassess PCT  <0.1                 Strongly Discouraged          Remeasure/reassess PCT      As 28 day mortality risk marker: \"Change in Procalcitonin Result\" (> 80 % or <=80 %) if Day 0 (or Day 1) and Day 4 values are available. Refer to http://www.[employer]-pct-calculator.com/   Change in PCT <=80 %   A decrease of PCT levels below or equal to 80 % defines a positive change in PCT test result representing a higher risk for 28-day all-cause " mortality of patients diagnosed with severe sepsis or septic shock.  Change in PCT > 80 %   A decrease of PCT levels of more than 80 % defines a negative change in PCT result representing a lower risk for 28-day all-cause mortality of patients diagnosed with severe sepsis or septic shock.                Results may be falsely decreased if patient taking Biotin.     Respiratory Panel, PCR - Swab, Nasopharynx [[id number]]  (Abnormal) Collected:  02/09/20 1734    Specimen:  Swab from Nasopharynx Updated:  02/09/20 1850     ADENOVIRUS, PCR Not Detected     Coronavirus 229E Not Detected     Coronavirus HKU1 Not Detected     Coronavirus NL63 Not Detected     Coronavirus OC43 Not Detected     Human Metapneumovirus Not Detected     Human Rhinovirus/Enterovirus Not Detected     Influenza B PCR Not Detected     Parainfluenza Virus 1 Not Detected     Parainfluenza Virus 2 Not Detected     Parainfluenza Virus 3 Not Detected     Parainfluenza Virus 4 Not Detected     Bordetella pertussis pcr Not Detected     Influenza A H1 2009 PCR Detected     Chlamydophila pneumoniae PCR Not Detected     Mycoplasma pneumo by PCR Not Detected     Influenza A H3 Not Detected     Influenza A H1 Not Detected     RSV, PCR Not Detected     Bordetella parapertussis PCR Not Detected    CBC Auto Differential [[id number]]  (Normal) Collected:  02/09/20 1734    Specimen:  Blood Updated:  02/09/20 1752     WBC 4.33 10*3/mm3      RBC 3.98 10*6/mm3      Hemoglobin 12.4 g/dL      Hematocrit 36.7 %      MCV 92.2 fL      MCH 31.2 pg      MCHC 33.8 g/dL      RDW 14.8 %      RDW-SD 49.6 fl      MPV 8.8 fL      Platelets 236 10*3/mm3      Neutrophil % 57.5 %      Lymphocyte % 31.6 %      Monocyte % 9.7 %      Eosinophil % 0.5 %      Basophil % 0.5 %      Immature Grans % 0.2 %      Neutrophils, Absolute 2.49 10*3/mm3      Lymphocytes, Absolute 1.37 10*3/mm3      Monocytes, Absolute 0.42 10*3/mm3      Eosinophils, Absolute 0.02 10*3/mm3      Basophils, Absolute 0.02  10*3/mm3      Immature Grans, Absolute 0.01 10*3/mm3      nRBC 0.0 /100 WBC     Lactic Acid, Reflex Timer (This will reflex a repeat order 3-3:15 hours after ordered.) [[id number]] Collected:  02/09/20 1734    Specimen:  Blood Updated:  02/09/20 1758          I ordered the above labs and reviewed the results    RADIOLOGY  XR Chest 1 View      CLINICAL HISTORY: Dyspnea.     The lungs are well-expanded and appear free of acute infiltrates. There  are no pleural effusions. The cardiomediastinal silhouette is  unremarkable.     IMPRESSIONS: No evidence of active disease within the chest.     This report was finalized on 2/9/2020 5:49 PM by Dr. Kee Cooper M.D.                I ordered the above noted radiological studies. Interpreted by radiologist. Reviewed by me in PACS.       PROCEDURES  Procedures    EKG    EKG time: 1656  Rhythm/Rate: normal sinus rhythm rate 84  No Acute Ischemia  Non-Specific ST-T changes    Unchanged compared to prior on 5/2016    Interpreted Contemporaneously by me.  Independently viewed by me        PROGRESS AND CONSULTS     1915  Rechecked patient who is resting comfortably. BP - 200/113, HR - 88, O2 - 96%.  Discussed all lab and test results. Discussed plan to give medication for pt's BP, cough, and start Tamiflu. Pt understands and agrees with the plan. All questions have been answered.  Pt states she does feel that she's breathing better after breathing treatment.    2049  Rechecked patient who is resting comfortably in no acute distress. BP - 167/107, HR - 75, O2 - 97%.  Discussed all lab and test results. Discussed plan to discharge the pt with medication for flu and BP. Pt understands and agrees with the plan.  Patient given very careful return to the emergency department instructions for increased fevers chills shortness of breath or weakness.  All questions have been answered.    MEDICAL DECISION MAKING  Results were reviewed/discussed with the patient and they were also made aware  of online access. Pt also made aware that some labs, such as cultures, will not be resulted during ER visit and follow up with PMD is necessary.     MDM  Number of Diagnoses or Management Options  Influenza A:      Amount and/or Complexity of Data Reviewed  Clinical lab tests: ordered and reviewed (Flu A positive, negative troponin, Lactate 2.3)  Tests in the radiology section of CPT®: ordered and reviewed (Chest XR - NAD)  Tests in the medicine section of CPT®: ordered and reviewed (Refer to the procedure section of the note for EKG results)           DIAGNOSIS  Final diagnoses:   Influenza A   Hypertension, unspecified type       DISPOSITION  DISCHARGE    Patient discharged in stable condition.    Reviewed implications of results, diagnosis, meds, responsibility to follow up, warning signs and symptoms of possible worsening, potential complications and reasons to return to ER.    Patient/Family voiced understanding of above instructions.    Discussed plan for discharge, as there is no emergent indication for admission. Patient referred to primary care provider for BP management due to today's BP. Pt/family is agreeable and understands need for follow up and repeat testing.  Pt is aware that discharge does not mean that nothing is wrong but it indicates no emergency is present that requires admission and they must continue care with follow-up as given below or physician of their choice.     FOLLOW-UP  Vinny Tucker III, NP-C  [address] [address]  [phone]    In 2 days  If symptoms worsen         Medication List      New Prescriptions    metoprolol succinate XL 50 MG 24 hr tablet  Commonly known as:  TOPROL-XL  Take 1 tablet by mouth Daily.     oseltamivir 75 MG capsule  Commonly known as:  TAMIFLU  Take 1 capsule by mouth Every 12 (Twelve) Hours.        Changed    levothyroxine 112 MCG tablet  Commonly known as:  SYNTHROID, LEVOTHROID  Take 1 tablet by mouth Daily.  What changed:   when to take this              Latest Documented Vital Signs:  As of 9:00 PM  BP- (!) 153/104 HR- 75 Temp- 97.8 °F (36.6 °C) (Tympanic) O2 sat- 96%    --  Documentation assistance provided by trina Hancock for Dr Rodriguez.  Information recorded by the scribe was done at my direction and has been verified and validated by me.            Vanita Hancock  02/09/20 2058       Vainta Hancock  02/09/20 2100       Sadi Rodriguez MD  02/09/20 0638

## 2020-02-10 NOTE — ED NOTES
Patient provided discharge instructions at this time.  Respirations are even and unlabored, chest rise and fall is equal in expansion.  All patients questions answered prior to departure from the ED.  Pt ambulated from ED with steady gait, capillary refill within normal limit, speech normal.       Nasrin Jones RN  02/09/20 7536

## 2020-02-12 ENCOUNTER — OFFICE VISIT (OUTPATIENT)
Dept: INTERNAL MEDICINE | Facility: CLINIC | Age: 65
End: 2020-02-12

## 2020-02-12 VITALS
HEART RATE: 68 BPM | TEMPERATURE: 97 F | DIASTOLIC BLOOD PRESSURE: 95 MMHG | WEIGHT: 112.2 LBS | RESPIRATION RATE: 16 BRPM | SYSTOLIC BLOOD PRESSURE: 142 MMHG | HEIGHT: 62 IN | BODY MASS INDEX: 20.65 KG/M2 | OXYGEN SATURATION: 97 %

## 2020-02-12 DIAGNOSIS — I10 ESSENTIAL HYPERTENSION: Primary | ICD-10-CM

## 2020-02-12 DIAGNOSIS — J11.1 FLU: ICD-10-CM

## 2020-02-12 DIAGNOSIS — R05.9 COUGH: ICD-10-CM

## 2020-02-12 PROCEDURE — 99213 OFFICE O/P EST LOW 20 MIN: CPT | Performed by: NURSE PRACTITIONER

## 2020-02-12 RX ORDER — DEXTROMETHORPHAN HYDROBROMIDE AND PROMETHAZINE HYDROCHLORIDE 15; 6.25 MG/5ML; MG/5ML
5 SYRUP ORAL 4 TIMES DAILY PRN
Qty: 180 ML | Refills: 0 | Status: SHIPPED | OUTPATIENT
Start: 2020-02-12 | End: 2020-03-10

## 2020-02-12 RX ORDER — ADALIMUMAB 40MG/0.4ML
KIT SUBCUTANEOUS
COMMUNITY
Start: 2020-02-11 | End: 2021-02-19

## 2020-02-12 RX ORDER — LOSARTAN POTASSIUM 50 MG/1
50 TABLET ORAL DAILY
Qty: 30 TABLET | Refills: 0 | Status: SHIPPED | OUTPATIENT
Start: 2020-02-12 | End: 2020-03-06

## 2020-02-12 NOTE — PATIENT INSTRUCTIONS
"Influenza, Adult  Influenza is also called \"the flu.\" It is an infection in the lungs, nose, and throat (respiratory tract). It is caused by a virus. The flu causes symptoms that are similar to symptoms of a cold. It also causes a high fever and body aches.  The flu spreads easily from person to person (is contagious). Getting a flu shot (influenza vaccination) every year is the best way to prevent the flu.  What are the causes?  This condition is caused by the influenza virus. You can get the virus by:  · Breathing in droplets that are in the air from the cough or sneeze of a person who has the virus.  · Touching something that has the virus on it (is contaminated) and then touching your mouth, nose, or eyes.  What increases the risk?  Certain things may make you more likely to get the flu. These include:  · Not washing your hands often.  · Having close contact with many people during cold and flu season.  · Touching your mouth, eyes, or nose without first washing your hands.  · Not getting a flu shot every year.  You may have a higher risk for the flu, along with serious problems such as a lung infection (pneumonia), if you:  · Are older than 65.  · Are pregnant.  · Have a weakened disease-fighting system (immune system) because of a disease or taking certain medicines.  · Have a long-term (chronic) illness, such as:  ? Heart, kidney, or lung disease.  ? Diabetes.  ? Asthma.  · Have a liver disorder.  · Are very overweight (morbidly obese).  · Have anemia. This is a condition that affects your red blood cells.  What are the signs or symptoms?  Symptoms usually begin suddenly and last 4-14 days. They may include:  · Fever and chills.  · Headaches, body aches, or muscle aches.  · Sore throat.  · Cough.  · Runny or stuffy (congested) nose.  · Chest discomfort.  · Not wanting to eat as much as normal (poor appetite).  · Weakness or feeling tired (fatigue).  · Dizziness.  · Feeling sick to your stomach (nauseous) or " throwing up (vomiting).  How is this treated?  If the flu is found early, you can be treated with medicine that can help reduce how bad the illness is and how long it lasts (antiviral medicine). This may be given by mouth (orally) or through an IV tube.  Taking care of yourself at home can help your symptoms get better. Your doctor may suggest:  · Taking over-the-counter medicines.  · Drinking plenty of fluids.  The flu often goes away on its own. If you have very bad symptoms or other problems, you may be treated in a hospital.  Follow these instructions at home:         Activity  · Rest as needed. Get plenty of sleep.  · Stay home from work or school as told by your doctor.  ? Do not leave home until you do not have a fever for 24 hours without taking medicine.  ? Leave home only to visit your doctor.  Eating and drinking  · Take an ORS (oral rehydration solution). This is a drink that is sold at pharmacies and stores.  · Drink enough fluid to keep your pee (urine) pale yellow.  · Drink clear fluids in small amounts as you are able. Clear fluids include:  ? Water.  ? Ice chips.  ? Fruit juice that has water added (diluted fruit juice).  ? Low-calorie sports drinks.  · Eat bland, easy-to-digest foods in small amounts as you are able. These foods include:  ? Bananas.  ? Applesauce.  ? Rice.  ? Lean meats.  ? Toast.  ? Crackers.  · Do not eat or drink:  ? Fluids that have a lot of sugar or caffeine.  ? Alcohol.  ? Spicy or fatty foods.  General instructions  · Take over-the-counter and prescription medicines only as told by your doctor.  · Use a cool mist humidifier to add moisture to the air in your home. This can make it easier for you to breathe.  · Cover your mouth and nose when you cough or sneeze.  · Wash your hands with soap and water often, especially after you cough or sneeze. If you cannot use soap and water, use alcohol-based hand .  · Keep all follow-up visits as told by your doctor. This is  "important.  How is this prevented?    · Get a flu shot every year. You may get the flu shot in late summer, fall, or winter. Ask your doctor when you should get your flu shot.  · Avoid contact with people who are sick during fall and winter (cold and flu season).  Contact a doctor if:  · You get new symptoms.  · You have:  ? Chest pain.  ? Watery poop (diarrhea).  ? A fever.  · Your cough gets worse.  · You start to have more mucus.  · You feel sick to your stomach.  · You throw up.  Get help right away if you:  · Have shortness of breath.  · Have trouble breathing.  · Have skin or nails that turn a bluish color.  · Have very bad pain or stiffness in your neck.  · Get a sudden headache.  · Get sudden pain in your face or ear.  · Cannot eat or drink without throwing up.  Summary  · Influenza (\"the flu\") is an infection in the lungs, nose, and throat. It is caused by a virus.  · Take over-the-counter and prescription medicines only as told by your doctor.  · Getting a flu shot every year is the best way to avoid getting the flu.  This information is not intended to replace advice given to you by your health care provider. Make sure you discuss any questions you have with your health care provider.  Document Released: 09/26/2009 Document Revised: 06/05/2019 Document Reviewed: 06/05/2019  [employer] Interactive Patient Education © 2019 [employer]    "

## 2020-02-12 NOTE — ASSESSMENT & PLAN NOTE
Hypertension is improving with treatment.  Continue current treatment regimen.  Dietary sodium restriction.  Regular aerobic exercise.  Medication changes per orders.  Blood pressure will be reassessed in 4 weeks.    Stop Claritin-D

## 2020-02-12 NOTE — PROGRESS NOTES
Name: Stacey Meyer  :  1955    Subjective:      Chief Complaint   Patient presents with   • Cough     Pt was dx with flu on Saturday   • Hypertension     Pt states BP was high at the hospital when she was seen and it has come down but it is st ill running high.         Stacey Meyer is a 64 y.o. female who is here for cough.     She went to the ER on Saturday +  Flu A H1 : Tamiflu was started (tolerating).  Her  and granddaughter also had the flu.  Tessalon given - states is mildly effective. States she feels slightly better.  Still fatigued. Cough bothers her the most because she can't rest.  Fever and chills have stopped.     Her blood pressure was elevated there (SBP 170s).  Toprol 50 mg daily added. States still not controlled (over 140s at [facility]).  Was on an ARB 4 years ago.  She was taking Claritin D.  No chest pain/ HA, SOA    She has been on humira for RA - she states Rhuem told her to hold it while she is ill.       HPI    I have reviewed the patient's medical history in detail and updated the computerized patient record.    Past Medical History:   Diagnosis Date   • Anxiety    • Depression    • Erosive osteoarthritis     Dr Galeano   • History of peptic ulcer    • History of transfusion     AFTER HIP REPLACEMENT    • Hypertension    • Osteoarthritis    • PONV (postoperative nausea and vomiting)    • Thyroid disease     HYPOTHYROID       Past Surgical History:   Procedure Laterality Date   • BILATERAL BREAST REDUCTION Bilateral 2018    Procedure: BREAST REDUCTION BILATERAL, NEW IMAGE;  Surgeon: Myranda Perea MD;  Location: [facility] MAIN OR;  Service: New Image   • BUNIONECTOMY Left    • COLONOSCOPY         • COLONOSCOPY N/A 2019    Procedure: COLONOSCOPY WITH COLD POLYPECTOMY x 2;  Surgeon: Danny Costa MD;  Location: [facility] ENDOSCOPY;  Service: Gastroenterology   • ECTOPIC PREGNANCY SURGERY Bilateral     2 SEPARATE SURGERIES   • ENDOSCOPY N/A  "2016    Procedure: ESOPHAGOGASTRODUODENOSCOPY WITH COLD BIOPSIES;  Surgeon: Danny Costa MD;  Location: [facility] ENDOSCOPY;  Service:    • ENDOSCOPY N/A 2016    Procedure: ESOPHAGOGASTRODUODENOSCOPY;  Surgeon: Danny Costa MD;  Location: [facility] ENDOSCOPY;  Service:    • FERTILITY SURGERY     • FOOT SURGERY Bilateral     \"THEY BROKE MY 5TH TOE ON BOTH SIDES AND STRIGHTENED THEM\"    • HAMMER TOE REPAIR Bilateral    • TONSILLECTOMY     • TOTAL HIP ARTHROPLASTY Right 2016    Procedure: RIGHT TOTAL HIP ARTHROPLASTY ANTERIOR APPROACH;  Surgeon: Eric Clayton MD;  Location: [facility] OR;  Service:        Family History   Problem Relation Age of Onset   • Liver disease Mother          at 69 yoa    • Autoimmune disease Mother    • Coronary artery disease Father    • Heart disease Father    • Stroke Maternal Aunt    • Obesity Brother    • Malig Hyperthermia Neg Hx        Social History     Socioeconomic History   • Marital status:      Spouse name: Not on file   • Number of children: 2   • Years of education: bachelor's degree   • Highest education level: Not on file   Occupational History   • Occupation: teacher   Tobacco Use   • Smoking status: Former Smoker     Packs/day: 1.00     Years: 10.00     Pack years: 10.00     Types: Cigarettes     Last attempt to quit:      Years since quittin.1   • Smokeless tobacco: Never Used   Substance and Sexual Activity   • Alcohol use: Yes     Alcohol/week: 4.0 standard drinks     Types: 4 Glasses of wine per week     Comment: Quit drinking 2019    • Drug use: No   • Sexual activity: Defer     Partners: Male       Most Recent Immunizations   Administered Date(s) Administered   • Influenza TIV (IM) 10/01/2016   • Tdap 2017         Review of Systems:   Review of Systems   Constitutional: Positive for fatigue.   HENT: Positive for congestion.    Respiratory: Positive for cough. Negative for shortness of breath.    Cardiovascular: Negative " "for chest pain.   Neurological: Negative for headaches.         Objective:      Physical Exam:   Physical Exam   Constitutional: She is oriented to person, place, and time. She appears well-developed. She is cooperative.   HENT:   Mouth/Throat: Oropharynx is clear and moist.   Eyes: Conjunctivae are normal.   Neck: Normal range of motion. No thyromegaly present.   Cardiovascular: Normal rate, regular rhythm, normal heart sounds, intact distal pulses and normal pulses.   Pulmonary/Chest: Effort normal and breath sounds normal. She has no wheezes. She exhibits no deformity.   Equal, Unlabored    Coarse cough - No C/W    Abdominal: Soft. Bowel sounds are normal.   Lymphadenopathy:     She has no cervical adenopathy.   Neurological: She is alert and oriented to person, place, and time.   Skin: Skin is warm and dry. Capillary refill takes 2 to 3 seconds.   Psychiatric: She has a normal mood and affect.   Vitals reviewed.        Vital Signs:  Vitals:    02/12/20 1026   BP: 142/95   BP Location: Left arm   Patient Position: Sitting   Cuff Size: Small Adult   Pulse: 68   Resp: 16   Temp: 97 °F (36.1 °C)   TempSrc: Oral   SpO2: 97%   Weight: 50.9 kg (112 lb 3.2 oz)   Height: 156.2 cm (61.5\")     Body mass index is 20.86 kg/m².      Results Review:      REVIEWED AND DISCUSSED LAB RESULTS WITH PATIENT  Lab Results   Component Value Date    GLUCOSE 88 02/09/2020    BUN 12 02/09/2020    CREATININE 0.45 (L) 02/09/2020    EGFRIFNONA 141 02/09/2020    EGFRIFAFRI 92 01/09/2019    BCR 26.7 (H) 02/09/2020    K 3.8 02/09/2020    CO2 24.1 02/09/2020    CALCIUM 8.9 02/09/2020    PROTENTOTREF 7.6 01/09/2019    ALBUMIN 4.00 02/09/2020    LABIL2 1.7 01/09/2019    AST 32 02/09/2020    ALT 21 02/09/2020       Requested Prescriptions     Signed Prescriptions Disp Refills   • losartan (COZAAR) 50 MG tablet 30 tablet 0     Sig: Take 1 tablet by mouth Daily.   • promethazine-dextromethorphan (PROMETHAZINE-DM) 6.25-15 MG/5ML syrup 180 mL 0     " Sig: Take 5 mL by mouth 4 (Four) Times a Day As Needed for Cough.     Routine medications provided by this office will also be refilled via pharmacy request.       Current Outpatient Medications:   •  benzonatate (TESSALON) 200 MG capsule, Take 1 capsule by mouth 3 (Three) Times a Day As Needed for Cough., Disp: 30 capsule, Rfl: 0  •  HUMIRA PEN 40 MG/0.4ML Pen-injector Kit, , Disp: , Rfl:   •  hydroxychloroquine (PLAQUENIL) 200 MG tablet, Take 200 mg by mouth Daily. PT HAS APPT WITH MD TOMORROW AND WILL ASK IF NEEDS TO HOLD FOR SURGERY, Disp: , Rfl:   •  levothyroxine (SYNTHROID, LEVOTHROID) 112 MCG tablet, Take 1 tablet by mouth Daily. (Patient taking differently: Take 112 mcg by mouth Every Morning.), Disp: 90 tablet, Rfl: 3  •  metoprolol succinate XL (TOPROL-XL) 50 MG 24 hr tablet, Take 1 tablet by mouth Daily., Disp: 30 tablet, Rfl: 0  •  oseltamivir (TAMIFLU) 75 MG capsule, Take 1 capsule by mouth Every 12 (Twelve) Hours., Disp: 10 capsule, Rfl: 0  •  losartan (COZAAR) 50 MG tablet, Take 1 tablet by mouth Daily., Disp: 30 tablet, Rfl: 0  •  promethazine-dextromethorphan (PROMETHAZINE-DM) 6.25-15 MG/5ML syrup, Take 5 mL by mouth 4 (Four) Times a Day As Needed for Cough., Disp: 180 mL, Rfl: 0       Assessment and Plan:        Problem List Items Addressed This Visit        Cardiovascular and Mediastinum    Hypertension - Primary     Hypertension is improving with treatment.  Continue current treatment regimen.  Dietary sodium restriction.  Regular aerobic exercise.  Medication changes per orders.  Blood pressure will be reassessed in 4 weeks.    Stop Claritin-D         Relevant Medications    losartan (COZAAR) 50 MG tablet      Other Visit Diagnoses     Flu        Improving, continue tamiflu    Cough        Add promethazine-DM   Plenty of fluids     Relevant Medications    promethazine-dextromethorphan (PROMETHAZINE-DM) 6.25-15 MG/5ML syrup             Discussed any change in Rx and discussed visit with patient.  " All questions answered.      Return in about 4 weeks (around 3/11/2020).    Vinny \"Slade\" Caitlin, APRN   02/12/20    Dragon disclaimer:   Much of this encounter note is an electronic transcription/translation of spoken language to printed text. The electronic translation of spoken language may permit erroneous, or at times, nonsensical words or phrases to be inadvertently transcribed; Although I have reviewed the note for such errors, some may still exist.     Additional Patient Counseling:       Patient Instructions   Influenza, Adult  Influenza is also called \"the flu.\" It is an infection in the lungs, nose, and throat (respiratory tract). It is caused by a virus. The flu causes symptoms that are similar to symptoms of a cold. It also causes a high fever and body aches.  The flu spreads easily from person to person (is contagious). Getting a flu shot (influenza vaccination) every year is the best way to prevent the flu.  What are the causes?  This condition is caused by the influenza virus. You can get the virus by:  · Breathing in droplets that are in the air from the cough or sneeze of a person who has the virus.  · Touching something that has the virus on it (is contaminated) and then touching your mouth, nose, or eyes.  What increases the risk?  Certain things may make you more likely to get the flu. These include:  · Not washing your hands often.  · Having close contact with many people during cold and flu season.  · Touching your mouth, eyes, or nose without first washing your hands.  · Not getting a flu shot every year.  You may have a higher risk for the flu, along with serious problems such as a lung infection (pneumonia), if you:  · Are older than 65.  · Are pregnant.  · Have a weakened disease-fighting system (immune system) because of a disease or taking certain medicines.  · Have a long-term (chronic) illness, such as:  ? Heart, kidney, or lung disease.  ? Diabetes.  ? Asthma.  · Have a liver " disorder.  · Are very overweight (morbidly obese).  · Have anemia. This is a condition that affects your red blood cells.  What are the signs or symptoms?  Symptoms usually begin suddenly and last 4-14 days. They may include:  · Fever and chills.  · Headaches, body aches, or muscle aches.  · Sore throat.  · Cough.  · Runny or stuffy (congested) nose.  · Chest discomfort.  · Not wanting to eat as much as normal (poor appetite).  · Weakness or feeling tired (fatigue).  · Dizziness.  · Feeling sick to your stomach (nauseous) or throwing up (vomiting).  How is this treated?  If the flu is found early, you can be treated with medicine that can help reduce how bad the illness is and how long it lasts (antiviral medicine). This may be given by mouth (orally) or through an IV tube.  Taking care of yourself at home can help your symptoms get better. Your doctor may suggest:  · Taking over-the-counter medicines.  · Drinking plenty of fluids.  The flu often goes away on its own. If you have very bad symptoms or other problems, you may be treated in a hospital.  Follow these instructions at home:         Activity  · Rest as needed. Get plenty of sleep.  · Stay home from work or school as told by your doctor.  ? Do not leave home until you do not have a fever for 24 hours without taking medicine.  ? Leave home only to visit your doctor.  Eating and drinking  · Take an ORS (oral rehydration solution). This is a drink that is sold at pharmacies and stores.  · Drink enough fluid to keep your pee (urine) pale yellow.  · Drink clear fluids in small amounts as you are able. Clear fluids include:  ? Water.  ? Ice chips.  ? Fruit juice that has water added (diluted fruit juice).  ? Low-calorie sports drinks.  · Eat bland, easy-to-digest foods in small amounts as you are able. These foods include:  ? Bananas.  ? Applesauce.  ? Rice.  ? Lean meats.  ? Toast.  ? Crackers.  · Do not eat or drink:  ? Fluids that have a lot of sugar or  "caffeine.  ? Alcohol.  ? Spicy or fatty foods.  General instructions  · Take over-the-counter and prescription medicines only as told by your doctor.  · Use a cool mist humidifier to add moisture to the air in your home. This can make it easier for you to breathe.  · Cover your mouth and nose when you cough or sneeze.  · Wash your hands with soap and water often, especially after you cough or sneeze. If you cannot use soap and water, use alcohol-based hand .  · Keep all follow-up visits as told by your doctor. This is important.  How is this prevented?    · Get a flu shot every year. You may get the flu shot in late summer, fall, or winter. Ask your doctor when you should get your flu shot.  · Avoid contact with people who are sick during fall and winter (cold and flu season).  Contact a doctor if:  · You get new symptoms.  · You have:  ? Chest pain.  ? Watery poop (diarrhea).  ? A fever.  · Your cough gets worse.  · You start to have more mucus.  · You feel sick to your stomach.  · You throw up.  Get help right away if you:  · Have shortness of breath.  · Have trouble breathing.  · Have skin or nails that turn a bluish color.  · Have very bad pain or stiffness in your neck.  · Get a sudden headache.  · Get sudden pain in your face or ear.  · Cannot eat or drink without throwing up.  Summary  · Influenza (\"the flu\") is an infection in the lungs, nose, and throat. It is caused by a virus.  · Take over-the-counter and prescription medicines only as told by your doctor.  · Getting a flu shot every year is the best way to avoid getting the flu.  This information is not intended to replace advice given to you by your health care provider. Make sure you discuss any questions you have with your health care provider.  Document Released: 09/26/2009 Document Revised: 06/05/2019 Document Reviewed: 06/05/2019  [employer] Interactive Patient Education © 2019 [employer]      "

## 2020-02-14 LAB
BACTERIA SPEC AEROBE CULT: NORMAL
BACTERIA SPEC AEROBE CULT: NORMAL

## 2020-03-06 DIAGNOSIS — I10 ESSENTIAL HYPERTENSION: ICD-10-CM

## 2020-03-06 RX ORDER — LOSARTAN POTASSIUM 50 MG/1
TABLET ORAL
Qty: 30 TABLET | Refills: 5 | Status: SHIPPED | OUTPATIENT
Start: 2020-03-06 | End: 2020-04-17

## 2020-03-10 ENCOUNTER — OFFICE VISIT (OUTPATIENT)
Dept: INTERNAL MEDICINE | Facility: CLINIC | Age: 65
End: 2020-03-10

## 2020-03-10 VITALS
SYSTOLIC BLOOD PRESSURE: 116 MMHG | WEIGHT: 117 LBS | BODY MASS INDEX: 21.53 KG/M2 | OXYGEN SATURATION: 99 % | HEART RATE: 85 BPM | HEIGHT: 62 IN | DIASTOLIC BLOOD PRESSURE: 76 MMHG

## 2020-03-10 DIAGNOSIS — E03.9 HYPOTHYROIDISM (ACQUIRED): ICD-10-CM

## 2020-03-10 DIAGNOSIS — I10 ESSENTIAL HYPERTENSION: Primary | ICD-10-CM

## 2020-03-10 PROCEDURE — 99213 OFFICE O/P EST LOW 20 MIN: CPT | Performed by: NURSE PRACTITIONER

## 2020-03-10 NOTE — ASSESSMENT & PLAN NOTE
Hypertension is improving with treatment.  Medication changes per orders.  Blood pressure will be reassessed at the next regular appointment.    If SBP still less than 110 or dizzy, she will cut losartan in half (25 mg daily). If still low, will stop it all together.     Much improved off claritin D

## 2020-03-10 NOTE — PROGRESS NOTES
Name: Stacey Meyer  :  1955    Subjective:      Chief Complaint   Patient presents with   • Hypothyroidism   • Hypertension     4 Week follow up        Stacey Meyer is a 64 y.o. female who is here for follow up of hypertension.     She was seen by me on 2020 after being to the ER with high blood pressure.  She was still not controlled on metoprolol.  Losartan was added.  She is also had the cold and flulike symptoms and she was taking Claritin-D.  States she is feeling much better.    Her blood pressure is fine today but she states that the last few days his systolic is been in the high 70s.  States she gets a little dizzy but no shortness of breath or chest pain.    She still not taking her Humira because she is afraid that it was causing her to get sick more frequently and she will discuss this with her rheumatologist in a few weeks.    Hypertension   This is a chronic problem. The current episode started more than 1 year ago. The problem has been rapidly improving since onset. The problem is controlled. Pertinent negatives include no chest pain, headaches, palpitations, peripheral edema or shortness of breath. Agents associated with hypertension include thyroid hormones (no longer taking claritin-d). Risk factors for coronary artery disease include post-menopausal state. Past treatments include beta blockers and angiotensin blockers. Current antihypertension treatment includes beta blockers and angiotensin blockers. The current treatment provides significant improvement. There are no compliance problems.  There is no history of CAD/MI or CVA.     She has chronic hypothyroidism and has been stable on Synthroid 112 mcg daily.  Due for labs today.     I have reviewed the patient's medical history in detail and updated the computerized patient record.    Past Medical History:   Diagnosis Date   • Anxiety    • Depression    • Erosive osteoarthritis     Dr Galeano   • History of  "peptic ulcer    • History of transfusion     AFTER HIP REPLACEMENT    • Hypertension    • Osteoarthritis    • PONV (postoperative nausea and vomiting)    • Thyroid disease     HYPOTHYROID       Past Surgical History:   Procedure Laterality Date   • BILATERAL BREAST REDUCTION Bilateral 2018    Procedure: BREAST REDUCTION BILATERAL, NEW IMAGE;  Surgeon: Myranda Perea MD;  Location: [facility] OR;  Service: New Image   • BUNIONECTOMY Left    • COLONOSCOPY         • COLONOSCOPY N/A 2019    Procedure: COLONOSCOPY WITH COLD POLYPECTOMY x 2;  Surgeon: Danny Costa MD;  [facility];  Service: Gastroenterology   • ECTOPIC PREGNANCY SURGERY Bilateral     2 SEPARATE SURGERIES   • ENDOSCOPY N/A 2016    Procedure: ESOPHAGOGASTRODUODENOSCOPY WITH COLD BIOPSIES;  Surgeon: Danny Costa MD;  Location: [facility] ENDOSCOPY;  Service:    • ENDOSCOPY N/A 2016    Procedure: ESOPHAGOGASTRODUODENOSCOPY;  Surgeon: Danny Costa MD;  Location: [facility] ENDOSCOPY;  Service:    • FERTILITY SURGERY     • FOOT SURGERY Bilateral     \"THEY BROKE MY 5TH TOE ON BOTH SIDES AND STRIGHTENED THEM\"    • HAMMER TOE REPAIR Bilateral    • TONSILLECTOMY     • TOTAL HIP ARTHROPLASTY Right 2016    Procedure: RIGHT TOTAL HIP ARTHROPLASTY ANTERIOR APPROACH;  Surgeon: Eric Clayton MD;  Location: [facility] MAIN OR;  Service:        Family History   Problem Relation Age of Onset   • Liver disease Mother          at 69 yoa    • Autoimmune disease Mother    • Coronary artery disease Father    • Heart disease Father    • Stroke Maternal Aunt    • Obesity Brother    • Malig Hyperthermia Neg Hx        Social History     Socioeconomic History   • Marital status:      Spouse name: Not on file   • Number of children: 2   • Years of education: bachelor's degree   • Highest education level: Not on file   Occupational History   • Occupation: teacher   Tobacco Use   • Smoking status: Former Smoker     " "Packs/day: 1.00     Years: 10.00     Pack years: 10.00     Types: Cigarettes     Last attempt to quit: 1986     Years since quittin.2   • Smokeless tobacco: Never Used   Substance and Sexual Activity   • Alcohol use: Yes     Alcohol/week: 4.0 standard drinks     Types: 4 Glasses of wine per week     Comment: Quit drinking 2019    • Drug use: No   • Sexual activity: Defer     Partners: Male       Most Recent Immunizations   Administered Date(s) Administered   • Influenza TIV (IM) 10/01/2016   • Tdap 2017         Review of Systems:   Review of Systems   Respiratory: Negative for shortness of breath.    Cardiovascular: Negative for chest pain and palpitations.   Neurological: Negative for headaches.         Objective:      Physical Exam:   Physical Exam   Constitutional: She is oriented to person, place, and time. She appears well-developed. She is cooperative.   Neck: No thyromegaly present.   Cardiovascular: Normal rate, regular rhythm, normal heart sounds, intact distal pulses and normal pulses.   No murmur heard.  Pulmonary/Chest: Effort normal and breath sounds normal. She exhibits no deformity.   Equal, Unlabored   Musculoskeletal: She exhibits no edema.   Neurological: She is alert and oriented to person, place, and time.   Skin: Capillary refill takes 2 to 3 seconds.   Psychiatric: She has a normal mood and affect. Her behavior is normal. Judgment and thought content normal.   Vitals reviewed.        Vital Signs:  Vitals:    03/10/20 1010   BP: 116/76   BP Location: Left arm   Patient Position: Sitting   Cuff Size: Adult   Pulse: 85   SpO2: 99%   Weight: 53.1 kg (117 lb)   Height: 156.2 cm (61.5\")     Body mass index is 21.75 kg/m².      Results Review:      REVIEWED AND DISCUSSED LAB RESULTS WITH PATIENT      Requested Prescriptions      No prescriptions requested or ordered in this encounter     Routine medications provided by this office will also be refilled via pharmacy request. " "      Current Outpatient Medications:   •  hydroxychloroquine (PLAQUENIL) 200 MG tablet, Take 200 mg by mouth Daily. PT HAS APPT WITH MD TOMORROW AND WILL ASK IF NEEDS TO HOLD FOR SURGERY, Disp: , Rfl:   •  levothyroxine (SYNTHROID, LEVOTHROID) 112 MCG tablet, Take 1 tablet by mouth Daily. (Patient taking differently: Take 112 mcg by mouth Every Morning.), Disp: 90 tablet, Rfl: 3  •  lidocaine (XYLOCAINE) 5 % ointment, Apply  topically to the appropriate area as directed Every 2 (Two) Hours As Needed for Moderate Pain ., Disp: 50 g, Rfl: 0  •  losartan (COZAAR) 50 MG tablet, TAKE 1 TABLET BY MOUTH EVERY DAY, Disp: 30 tablet, Rfl: 5  •  mupirocin (BACTROBAN) 2 % ointment, 2 (Two) Times a Day., Disp: , Rfl:   •  HUMIRA PEN 40 MG/0.4ML Pen-injector Kit, , Disp: , Rfl:        Assessment and Plan:        Problem List Items Addressed This Visit        Cardiovascular and Mediastinum    Hypertension - Primary     Hypertension is improving with treatment.  Medication changes per orders.  Blood pressure will be reassessed at the next regular appointment.    If SBP still less than 110 or dizzy, she will cut losartan in half (25 mg daily). If still low, will stop it all together.     Much improved off claritin D          Relevant Orders    Basic Metabolic Panel    TSH    T4, free       Endocrine    Hypothyroidism (acquired)     Check tsh ft4 today.          Relevant Orders    TSH    T4, free             Discussed any change in Rx and discussed visit with patient.  All questions answered.      Return if symptoms worsen or fail to improve:Already has an appointment scheduled for annual exam. .    Vinny \"Slade\" Caitlin, APRN   03/10/20    Dragon disclaimer:   Much of this encounter note is an electronic transcription/translation of spoken language to printed text. The electronic translation of spoken language may permit erroneous, or at times, nonsensical words or phrases to be inadvertently transcribed; Although I have reviewed " the note for such errors, some may still exist.     Additional Patient Counseling:       There are no Patient Instructions on file for this visit.

## 2020-03-13 RX ORDER — LEVOTHYROXINE SODIUM 112 UG/1
TABLET ORAL
Qty: 90 TABLET | Refills: 1 | Status: SHIPPED | OUTPATIENT
Start: 2020-03-13 | End: 2020-09-21

## 2020-04-17 DIAGNOSIS — I10 ESSENTIAL HYPERTENSION: ICD-10-CM

## 2020-04-17 RX ORDER — LOSARTAN POTASSIUM 50 MG/1
TABLET ORAL
Qty: 90 TABLET | Refills: 1 | Status: SHIPPED | OUTPATIENT
Start: 2020-04-17 | End: 2021-06-09

## 2020-06-04 ENCOUNTER — APPOINTMENT (OUTPATIENT)
Dept: WOMENS IMAGING | Facility: HOSPITAL | Age: 65
End: 2020-06-04

## 2020-06-04 PROCEDURE — 77067 SCR MAMMO BI INCL CAD: CPT | Performed by: RADIOLOGY

## 2020-06-04 PROCEDURE — 77063 BREAST TOMOSYNTHESIS BI: CPT | Performed by: RADIOLOGY

## 2020-06-10 ENCOUNTER — OFFICE VISIT (OUTPATIENT)
Dept: INTERNAL MEDICINE | Facility: CLINIC | Age: 65
End: 2020-06-10

## 2020-06-10 VITALS
BODY MASS INDEX: 20.24 KG/M2 | HEIGHT: 62 IN | SYSTOLIC BLOOD PRESSURE: 121 MMHG | DIASTOLIC BLOOD PRESSURE: 77 MMHG | RESPIRATION RATE: 16 BRPM | HEART RATE: 79 BPM | TEMPERATURE: 98.1 F | OXYGEN SATURATION: 97 % | WEIGHT: 110 LBS

## 2020-06-10 DIAGNOSIS — E03.9 HYPOTHYROIDISM (ACQUIRED): ICD-10-CM

## 2020-06-10 DIAGNOSIS — M06.09 RHEUMATOID ARTHRITIS OF MULTIPLE SITES WITH NEGATIVE RHEUMATOID FACTOR (HCC): ICD-10-CM

## 2020-06-10 DIAGNOSIS — I10 ESSENTIAL HYPERTENSION: Primary | ICD-10-CM

## 2020-06-10 PROCEDURE — 99396 PREV VISIT EST AGE 40-64: CPT | Performed by: NURSE PRACTITIONER

## 2020-06-10 NOTE — ASSESSMENT & PLAN NOTE
Hypertension is improving with treatment.  Continue current treatment regimen.  Regular aerobic exercise.  Blood pressure will be reassessed at the next regular appointment.    She will try to wean off cozaar.  As long as SBP less than 130.  She can stay off rx.

## 2020-06-10 NOTE — PATIENT INSTRUCTIONS
Vaccines:     Shingles vaccine is given in TWO separate injections.   CDC recommends that healthy adults 50 years and older get two doses of the shingles vaccine called Shingrix (recombinant zoster vaccine),  by 2 to 6 months, to prevent shingles and the complications from the disease.   Diet:    • Eat vegetables, fruits, whole grain, low-fat dairy, poultry, fish, beans, nontropical vegetable oils, and nuts, but avoid red meat (i.e., Mediterranean-style diet, DASH [Dietary Approaches to Stop Hypertension] diet).  • Limit sugary drinks and sweets.  • Limit saturated and trans fat to 5% to 6% of calories.  • Limit sodium intake to 2,400 mg daily (about one teaspoon table salt [kosher/sea salt have less sodium per teaspoon]).  Weight loss / Calorie Counting Apps:    • Lose It!   • MyFitness Pal   • Works great when you try it with a partner/ friend  Exercise:   • Engage in moderate-to-vigorous aerobic activity for at least 40 minutes (on average) three to four times each week.  Wearables:   • Activity tracker   • Step tracker   Skin Care:   • Use sun screen SPF >30 daily  • Dermatologist for skin check regularly  Bone Health:   • [email or web address]

## 2020-06-10 NOTE — ASSESSMENT & PLAN NOTE
Stable on current dose.  She will have her TSH checked with her next lab draw in August and the results will be sent to me.

## 2020-06-10 NOTE — PROGRESS NOTES
Name: Stacey Meyer  :  1955    Subjective:      Chief Complaint   Patient presents with   • Annual Exam     Pt presents here today for her annual physical.        Stacey Meyer is a 64 y.o. female patient.  She has multiple chronic medical conditions including: Hypertension, hypothyroid, rheumatoid arthritis. Due for annual exam except GYN (was done by KISHORE Howell).    She was last seen me on March 10.  No new complaints.    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     Rheumatoid arthritis  This is a chronic condition.  Negative RA factor.  She continues Plaquenil and started Humira this year.  Mainly affects her hands.  She plans to go see [facility] for second opinion about hand surgery.    Hypertension  This is a chronic problem for greater than 5 years.  She was off her medication for 4 years but had to be restarted 1 year ago.  She also states she is under more stress.  Her blood pressure is standing systolically in the 110s 120s.  No shortness of breath or chest pain    Hypothyroid  This is a chronic problem.  States she has been doing well on the current dose.  Just had labs checked with rheumatology but did not get a TSH.  We will get her labs done every 3 months next being in August and will get it drawn there they will see me the results so she does not have to have labs drawn today.    Stacey is here for coordination of medical care, to discuss health maintenance, disease prevention as well as to followup on medical problems.     Activity level is moderate. Exercises 5 per week.45 mins at a time. Appetite is good. Feels well with few complaints. Energy level is good. Sleeps well.        Rheum: [place]     Health Maintenance Female:    · Patient's last mammogram was 2020 (Monday)   · Last gynecology appointment: Katie Howell   · Pap smears have been: 2020 negative - Lynda   · Patient's last mammogram was   [unfilled]    Patient's last  "colonoscopy was 2019.  Dr Costa    She is advised to repeat in 5 years.    Vaccines: due for shingles     She gets yearly eye exam.    Dermatologist yearly.  Tans in back yard       Health Maintenance Due   Topic   • ZOSTER VACCINE (1 of 2)       I have reviewed the patient's medical history in detail and updated the computerized patient record.    Past Medical History:   Diagnosis Date   • Anxiety    • Depression    • Erosive osteoarthritis     Dr Galeano   • History of peptic ulcer    • History of transfusion     AFTER HIP REPLACEMENT    • Hypertension    • Osteoarthritis    • PONV (postoperative nausea and vomiting)    • Thyroid disease     HYPOTHYROID       Past Surgical History:   Procedure Laterality Date   • BILATERAL BREAST REDUCTION Bilateral 2018    Procedure: BREAST REDUCTION BILATERAL, NEW IMAGE;  Surgeon: Myranda Perea MD;  Location: [facility] OR;  Service: New Image   • BUNIONECTOMY Left    • COLONOSCOPY         • COLONOSCOPY N/A 2019    Procedure: COLONOSCOPY WITH COLD POLYPECTOMY x 2;  Surgeon: Danny Costa MD;  Location: [facility] ENDOSCOPY;  Service: Gastroenterology   • ECTOPIC PREGNANCY SURGERY Bilateral     2 SEPARATE SURGERIES   • ENDOSCOPY N/A 2016    Procedure: ESOPHAGOGASTRODUODENOSCOPY WITH COLD BIOPSIES;  Surgeon: Danny Costa MD;  Location: [facility] ENDOSCOPY;  Service:    • ENDOSCOPY N/A 2016    Procedure: ESOPHAGOGASTRODUODENOSCOPY;  Surgeon: Danny Costa MD;  Location: [facility] ENDOSCOPY;  Service:    • FERTILITY SURGERY     • FOOT SURGERY Bilateral     \"THEY BROKE MY 5TH TOE ON BOTH SIDES AND STRIGHTENED THEM\"    • HAMMER TOE REPAIR Bilateral    • TONSILLECTOMY     • TOTAL HIP ARTHROPLASTY Right 2016    Procedure: RIGHT TOTAL HIP ARTHROPLASTY ANTERIOR APPROACH;  Surgeon: Eric Clayton MD;  Location: [facility] OR;  Service:        Family History   Problem Relation Age of Onset   • Liver disease Mother          at 69 yoa    • " Autoimmune disease Mother    • Coronary artery disease Father    • Heart disease Father    • Stroke Maternal Aunt    • Obesity Brother    • Malig Hyperthermia Neg Hx        Social History     Socioeconomic History   • Marital status:      Spouse name: Not on file   • Number of children: 2   • Years of education: bachelor's degree   • Highest education level: Not on file   Occupational History   • Occupation: teacher   Tobacco Use   • Smoking status: Former Smoker     Packs/day: 1.00     Years: 10.00     Pack years: 10.00     Types: Cigarettes     Last attempt to quit:      Years since quittin.4   • Smokeless tobacco: Never Used   Substance and Sexual Activity   • Alcohol use: Yes     Alcohol/week: 4.0 standard drinks     Types: 4 Glasses of wine per week     Comment: Quit drinking 2019    • Drug use: No   • Sexual activity: Defer     Partners: Male       Most Recent Immunizations   Administered Date(s) Administered   • Influenza TIV (IM) 10/01/2016   • Tdap 2017         Review of Systems:   Review of Systems   Constitutional: Negative for unexpected weight change.   HENT: Negative.    Eyes: Negative for visual disturbance.   Respiratory: Negative for shortness of breath.    Cardiovascular: Negative for chest pain, palpitations and leg swelling.   Gastrointestinal: Negative for abdominal pain, blood in stool and constipation.   Endocrine: Negative.    Genitourinary: Negative.    Musculoskeletal: Positive for arthralgias.   Skin: Negative.    Neurological: Negative.    Hematological: Negative.    Psychiatric/Behavioral: Negative.          Objective:      Physical Exam:   Physical Exam   Constitutional: She is oriented to person, place, and time. She appears well-developed. She is cooperative.   HENT:   Head: Normocephalic.   Nose: Nose normal.   Mouth/Throat: Uvula is midline, oropharynx is clear and moist and mucous membranes are normal.   Eyes: Pupils are equal, round, and reactive to  "light. Conjunctivae are normal.   Neck: Normal range of motion. Neck supple. No thyromegaly present.   Cardiovascular: Normal rate, regular rhythm, S1 normal, S2 normal, normal heart sounds, intact distal pulses and normal pulses.   No murmur heard.  Pulmonary/Chest: Effort normal and breath sounds normal. She has no wheezes. She exhibits no deformity.   Equal, Unlabored   Abdominal: Soft. Bowel sounds are normal. There is no hepatosplenomegaly. There is no tenderness. There is negative Lees's sign.   Genitourinary:   Genitourinary Comments: Deferred   Musculoskeletal: Normal range of motion.   Ulnar drift - BL hands    Lymphadenopathy:     She has no cervical adenopathy.     She has no axillary adenopathy.   Neurological: She is alert and oriented to person, place, and time. She has normal strength. No cranial nerve deficit or sensory deficit. She displays a negative Romberg sign. Coordination normal.   Skin: Skin is warm, dry and intact. Capillary refill takes 2 to 3 seconds.   Psychiatric: She has a normal mood and affect. Her speech is normal and behavior is normal. Judgment and thought content normal. Cognition and memory are normal.   Vitals reviewed.        Vital Signs:  Vitals:    06/10/20 0830   BP: 121/77   BP Location: Left arm   Patient Position: Sitting   Cuff Size: Adult   Pulse: 79   Resp: 16   Temp: 98.1 °F (36.7 °C)   TempSrc: Oral   SpO2: 97%   Weight: 49.9 kg (110 lb)   Height: 156.2 cm (61.5\")     Body mass index is 20.45 kg/m².      Results Review:      REVIEWED AND DISCUSSED LAB RESULTS WITH PATIENT      Requested Prescriptions      No prescriptions requested or ordered in this encounter     Routine medications provided by this office will also be refilled via pharmacy request.       Current Outpatient Medications:   •  HUMIRA PEN 40 MG/0.4ML Pen-injector Kit, , Disp: , Rfl:   •  hydroxychloroquine (PLAQUENIL) 200 MG tablet, Take 200 mg by mouth Daily. PT HAS APPT WITH MD TOMORROW AND WILL " "ASK IF NEEDS TO HOLD FOR SURGERY, Disp: , Rfl:   •  levothyroxine (SYNTHROID, LEVOTHROID) 112 MCG tablet, TAKE 1 TABLET BY MOUTH EVERY DAY, Disp: 90 tablet, Rfl: 1  •  lidocaine (XYLOCAINE) 5 % ointment, Apply  topically to the appropriate area as directed Every 2 (Two) Hours As Needed for Moderate Pain ., Disp: 50 g, Rfl: 0  •  losartan (COZAAR) 50 MG tablet, TAKE 1 TABLET BY MOUTH EVERY DAY, Disp: 90 tablet, Rfl: 1       Assessment and Plan:        Problem List Items Addressed This Visit        Cardiovascular and Mediastinum    Hypertension - Primary     Hypertension is improving with treatment.  Continue current treatment regimen.  Regular aerobic exercise.  Blood pressure will be reassessed at the next regular appointment.    She will try to wean off cozaar.  As long as SBP less than 130.  She can stay off rx.             Endocrine    Hypothyroidism (acquired)     Stable on current dose.  She will have her TSH checked with her next lab draw in August and the results will be sent to me.            Musculoskeletal and Integument    Rheumatoid arthritis of multiple sites with negative rheumatoid factor (CMS/HCC)     Improving now on Humira  Follow-up rheumatology                  Discussed any change in Rx and discussed visit with patient.  All questions answered.      Return in about 6 months (around 12/10/2020).    Vinny \"Slade\" Caitlin, JENIFFER   06/10/20    Dragon disclaimer:   Much of this encounter note is an electronic transcription/translation of spoken language to printed text. The electronic translation of spoken language may permit erroneous, or at times, nonsensical words or phrases to be inadvertently transcribed; Although I have reviewed the note for such errors, some may still exist.     Additional Patient Counseling:       Patient Instructions   Vaccines:     Shingles vaccine is given in TWO separate injections.   CDC recommends that healthy adults 50 years and older get two doses of the shingles " vaccine called Shingrix (recombinant zoster vaccine),  by 2 to 6 months, to prevent shingles and the complications from the disease.   Diet:    • Eat vegetables, fruits, whole grain, low-fat dairy, poultry, fish, beans, nontropical vegetable oils, and nuts, but avoid red meat (i.e., Mediterranean-style diet, DASH [Dietary Approaches to Stop Hypertension] diet).  • Limit sugary drinks and sweets.  • Limit saturated and trans fat to 5% to 6% of calories.  • Limit sodium intake to 2,400 mg daily (about one teaspoon table salt [kosher/sea salt have less sodium per teaspoon]).  Weight loss / Calorie Counting Apps:    • Lose It!   • MyFitness Pal   • Works great when you try it with a partner/ friend  Exercise:   • Engage in moderate-to-vigorous aerobic activity for at least 40 minutes (on average) three to four times each week.  Wearables:   • Activity tracker   • Step tracker   Skin Care:   • Use sun screen SPF >30 daily  • Dermatologist for skin check regularly  Bone Health:   • [email or web address]

## 2020-09-11 ENCOUNTER — TELEPHONE (OUTPATIENT)
Dept: INTERNAL MEDICINE | Facility: CLINIC | Age: 65
End: 2020-09-11

## 2020-09-21 RX ORDER — LEVOTHYROXINE SODIUM 112 UG/1
TABLET ORAL
Qty: 90 TABLET | Refills: 1 | Status: SHIPPED | OUTPATIENT
Start: 2020-09-21 | End: 2021-04-30

## 2020-11-19 ENCOUNTER — OFFICE VISIT (OUTPATIENT)
Dept: ORTHOPEDIC SURGERY | Facility: CLINIC | Age: 65
End: 2020-11-19

## 2020-11-19 VITALS — BODY MASS INDEX: 20.77 KG/M2 | WEIGHT: 110.01 LBS | HEIGHT: 61 IN | TEMPERATURE: 97.1 F

## 2020-11-19 DIAGNOSIS — R52 PAIN: Primary | ICD-10-CM

## 2020-11-19 PROCEDURE — 99213 OFFICE O/P EST LOW 20 MIN: CPT | Performed by: NURSE PRACTITIONER

## 2020-11-19 PROCEDURE — 73502 X-RAY EXAM HIP UNI 2-3 VIEWS: CPT | Performed by: NURSE PRACTITIONER

## 2020-11-19 RX ORDER — MELOXICAM 15 MG/1
TABLET ORAL
Qty: 30 TABLET | Refills: 3 | Status: SHIPPED | OUTPATIENT
Start: 2020-11-19 | End: 2021-02-19

## 2020-11-19 NOTE — PROGRESS NOTES
Patient: Stacey Meyer  YOB: 1955 65 y.o. female  Medical Record Number: [id number]    Chief Complaints: Right hip pain    History of Present Illness:Stacey Meyer is a 65 y.o. female who presents with complaints of anterior right hip pain.  Patient had previous right total hip replacement about 4 years ago had been doing great, about a week ago started with moderate sharp intermittent type pain stabbing in nature worse with driving picking up her leg crossing her leg, better with change in position.  And actually feels better now.  Denies any pain in her lower back.  Denies any recent injury    Allergies:   Allergies   Allergen Reactions   • Erythromycin Rash   • Metronidazole Rash and Swelling   • Oxycodone-Aspirin Nausea And Vomiting and Nausea Only   • Sulfa Antibiotics Rash   • Tetracycline Rash   • Tetracyclines & Related Rash       Medications:   Current Outpatient Medications   Medication Sig Dispense Refill   • HUMIRA PEN 40 MG/0.4ML Pen-injector Kit      • hydroxychloroquine (PLAQUENIL) 200 MG tablet Take 200 mg by mouth Daily. PT HAS APPT WITH MD TOMORROW AND WILL ASK IF NEEDS TO HOLD FOR SURGERY     • levothyroxine (SYNTHROID, LEVOTHROID) 112 MCG tablet TAKE 1 TABLET BY MOUTH EVERY DAY 90 tablet 1   • lidocaine (XYLOCAINE) 5 % ointment Apply  topically to the appropriate area as directed Every 2 (Two) Hours As Needed for Moderate Pain . 50 g 0   • losartan (COZAAR) 50 MG tablet TAKE 1 TABLET BY MOUTH EVERY DAY 90 tablet 1     No current facility-administered medications for this visit.          The following portions of the patient's history were reviewed and updated as appropriate: allergies, current medications, past family history, past medical history, past social history, past surgical history and problem list.    Review of Systems:   A 14 point review of systems was performed. All systems negative except pertinent positives/negative listed in HPI above    Physical Exam:   Vitals:  "   11/19/20 0911   Temp: 97.1 °F (36.2 °C)   TempSrc: Temporal   Weight: 49.9 kg (110 lb 0.2 oz)   Height: 156.2 cm (61.5\")   PainSc:   2   PainLoc: Hip       General: A and O x 3, ASA, NAD    SCLERA:    Normal    DENTITION:   Normal  Skin clear no unusual lesions noted  Right hip patient is nontender palpation she has excellent range of motion with no instability calf is soft and nontender       Radiology:  Xrays 2 views of right hip ordered and reviewed today secondary to pain show well-placed well-positioned right total hip replacement.  Compared to views are unchanged    Assessment/Plan: Right hip pain most likely secondary to tendinitis    At this point patient is feeling better, we will send prescription to her pharmacy for meloxicam have her take it regularly for the next week and as needed, she will hold off on any formal exercise and let me know if the pain returns    Lizy Mendoza, JENIFFER    "

## 2021-02-19 ENCOUNTER — OFFICE VISIT (OUTPATIENT)
Dept: INTERNAL MEDICINE | Facility: CLINIC | Age: 66
End: 2021-02-19

## 2021-02-19 VITALS
HEART RATE: 80 BPM | HEIGHT: 62 IN | BODY MASS INDEX: 20.61 KG/M2 | SYSTOLIC BLOOD PRESSURE: 138 MMHG | OXYGEN SATURATION: 99 % | DIASTOLIC BLOOD PRESSURE: 80 MMHG | WEIGHT: 112 LBS | TEMPERATURE: 97.1 F

## 2021-02-19 DIAGNOSIS — E03.9 ACQUIRED HYPOTHYROIDISM: Chronic | ICD-10-CM

## 2021-02-19 DIAGNOSIS — I10 ESSENTIAL HYPERTENSION: Primary | Chronic | ICD-10-CM

## 2021-02-19 DIAGNOSIS — M06.09 RHEUMATOID ARTHRITIS OF MULTIPLE SITES WITH NEGATIVE RHEUMATOID FACTOR (HCC): Chronic | ICD-10-CM

## 2021-02-19 LAB
BUN SERPL-MCNC: 12 MG/DL (ref 8–23)
BUN/CREAT SERPL: 16 (ref 7–25)
CALCIUM SERPL-MCNC: 10.3 MG/DL (ref 8.6–10.5)
CHLORIDE SERPL-SCNC: 99 MMOL/L (ref 98–107)
CO2 SERPL-SCNC: 28.9 MMOL/L (ref 22–29)
CREAT SERPL-MCNC: 0.75 MG/DL (ref 0.57–1)
ERYTHROCYTE [DISTWIDTH] IN BLOOD BY AUTOMATED COUNT: 12.5 % (ref 12.3–15.4)
GLUCOSE SERPL-MCNC: 89 MG/DL (ref 65–99)
HCT VFR BLD AUTO: 39.5 % (ref 34–46.6)
HGB BLD-MCNC: 13 G/DL (ref 12–15.9)
MCH RBC QN AUTO: 29.4 PG (ref 26.6–33)
MCHC RBC AUTO-ENTMCNC: 32.9 G/DL (ref 31.5–35.7)
MCV RBC AUTO: 89.4 FL (ref 79–97)
PLATELET # BLD AUTO: 371 10*3/MM3 (ref 140–450)
POTASSIUM SERPL-SCNC: 4.6 MMOL/L (ref 3.5–5.2)
RBC # BLD AUTO: 4.42 10*6/MM3 (ref 3.77–5.28)
SODIUM SERPL-SCNC: 137 MMOL/L (ref 136–145)
T4 FREE SERPL-MCNC: 1.65 NG/DL (ref 0.93–1.7)
TSH SERPL DL<=0.005 MIU/L-ACNC: 0.28 UIU/ML (ref 0.27–4.2)
WBC # BLD AUTO: 6.4 10*3/MM3 (ref 3.4–10.8)

## 2021-02-19 PROCEDURE — 99214 OFFICE O/P EST MOD 30 MIN: CPT | Performed by: NURSE PRACTITIONER

## 2021-02-19 NOTE — PROGRESS NOTES
Chief Complaint  Hypertension and Hypothyroidism     Subjective:      History of Present Illness {CC  Problem List  Visit  Diagnosis   Encounters  Notes  Medications  Labs  Result Review Imaging  Media :23}     Stacey Meyer presents to [facility] for chronic medical conditions including: Hypertension, hypothyroid, rheumatoid arthritis    Had first COVID vaccine yesterday - no side effects.     RA:   R hand surgery: 7/2020 - states doing much better and plans to have L  She had eye exam in the last month.  She continues Plaquenil and is followed by rheumatology.    Her hypertension has been controlled.  I decreased her from 50 mg of losartan and she is now down to 25 mg daily.  She says her blood pressures at home is in the low 120s.  She would like to try off the medication.  She denies shortness of breath or chest pain.    She continues Synthroid 112 mcg daily for hypothyroidism.  She feels like her current dose is appropriate.  Denies any tremor or other side effects.      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        Objective:      Physical Exam  Vitals signs reviewed.   Constitutional:       Appearance: She is well-developed.   HENT:      Head: Normocephalic.      Comments: Wearing mask due to COVID   Eyes:      Conjunctiva/sclera: Conjunctivae normal.   Neck:      Musculoskeletal: Normal range of motion and neck supple.      Thyroid: No thyromegaly.   Cardiovascular:      Rate and Rhythm: Normal rate and regular rhythm.      Pulses: Normal pulses.      Heart sounds: Normal heart sounds.   Pulmonary:      Effort: Pulmonary effort is normal.      Breath sounds: Normal breath sounds.      Comments: E/U   Abdominal:      General: Bowel sounds are normal.      Palpations: Abdomen is soft.   Musculoskeletal: Normal range of motion.      Comments: More movement in R hand after surgery.    Lymphadenopathy:      Cervical: No cervical adenopathy.  "  Skin:     General: Skin is warm and dry.      Capillary Refill: Capillary refill takes 2 to 3 seconds.   Neurological:      Mental Status: She is alert and oriented to person, place, and time.   Psychiatric:         Behavior: Behavior normal. Behavior is cooperative.         Thought Content: Thought content normal.         Judgment: Judgment normal.        Result Review  Data Reviewed:{ Labs  Result Review  Imaging  Med Tab  Media :23}          Vital Signs:   /80 (BP Location: Left arm, Patient Position: Sitting, Cuff Size: Adult)   Pulse 80   Temp 97.1 °F (36.2 °C) (Oral)   Ht 156.2 cm (61.5\")   Wt 50.8 kg (112 lb)   SpO2 99%   BMI 20.82 kg/m²         Requested Prescriptions      No prescriptions requested or ordered in this encounter     Routine medications provided by this office will also be refilled via pharmacy request.       Current Outpatient Medications:   •  hydroxychloroquine (PLAQUENIL) 200 MG tablet, Take 200 mg by mouth Daily. PT HAS APPT WITH MD TOMORROW AND WILL ASK IF NEEDS TO HOLD FOR SURGERY, Disp: , Rfl:   •  levothyroxine (SYNTHROID, LEVOTHROID) 112 MCG tablet, TAKE 1 TABLET BY MOUTH EVERY DAY, Disp: 90 tablet, Rfl: 1  •  losartan (COZAAR) 50 MG tablet, TAKE 1 TABLET BY MOUTH EVERY DAY (Patient taking differently: Taking 1/2 tablet daily), Disp: 90 tablet, Rfl: 1     Assessment and Plan:      Assessment and Plan {CC Problem List  Visit Diagnosis  ROS  Review (Popup)  Health Maintenance  Quality  BestPractice  Medications  SmartSets  SnapShot Encounters  Media :23}     Problem List Items Addressed This Visit        Cardiac and Vasculature    Hypertension - Primary (Chronic)    Current Assessment & Plan     Hypertension is improving with treatment.  Dietary sodium restriction.  Regular aerobic exercise.  Blood pressure will be reassessed at the next regular appointment.    She will trial off rx - if SBP over 130s, she will restart rx.          Relevant Orders    CBC " (No Diff)    Basic Metabolic Panel       Endocrine and Metabolic    Acquired hypothyroidism (Chronic)    Current Assessment & Plan     Stable   Check lab for ongoing therapeutic drug monitoring          Relevant Orders    CBC (No Diff)    TSH    T4, free       Musculoskeletal and Injuries    Rheumatoid arthritis of multiple sites with negative rheumatoid factor (CMS/HCC) (Chronic)    Overview     Seronegative RA - has been seen by Dr Galeano  Tried Humira 2020: no improvement, discontinued          Current Assessment & Plan     Chronic - stable on current rx.                Follow Up {Instructions Charge Capture  Follow-up Communications :23}     Return in about 6 months (around 8/19/2021) for Annual physical.  Patient was given instructions and counseling regarding her condition or for health maintenance advice. Please see specific information pulled into the AVS if appropriate.    Dragon disclaimer:   Much of this encounter note is an electronic transcription/translation of spoken language to printed text. The electronic translation of spoken language may permit erroneous, or at times, nonsensical words or phrases to be inadvertently transcribed; Although I have reviewed the note for such errors, some may still exist.     Additional Patient Counseling:       Patient Instructions     Topical CBD:   Try the website for Jeremiah     [email or web address]    Releaf Sunshine     15 ml jar is about $29.      Per reports, the jar will last quite a long time.

## 2021-02-19 NOTE — ASSESSMENT & PLAN NOTE
Hypertension is improving with treatment.  Dietary sodium restriction.  Regular aerobic exercise.  Blood pressure will be reassessed at the next regular appointment.    She will trial off rx - if SBP over 130s, she will restart rx.

## 2021-02-19 NOTE — PATIENT INSTRUCTIONS
Topical CBD:   Try the website for Jeremiah     [email or web address]    Releaf [place]     15 ml jar is about $29.      Per reports, the jar will last quite a long time.

## 2021-04-30 RX ORDER — LEVOTHYROXINE SODIUM 112 UG/1
TABLET ORAL
Qty: 90 TABLET | Refills: 0 | Status: SHIPPED | OUTPATIENT
Start: 2021-04-30 | End: 2021-07-28

## 2021-06-09 ENCOUNTER — TELEPHONE (OUTPATIENT)
Dept: INTERNAL MEDICINE | Facility: CLINIC | Age: 66
End: 2021-06-09

## 2021-06-09 NOTE — TELEPHONE ENCOUNTER
Pt called and was transferred by the HUB.  She requested an appt with Slade or another provider in our office.  Explained [place] and encouraged her to go to Urgent Care for eval and tx.  Pt understood

## 2021-07-20 ENCOUNTER — TELEPHONE (OUTPATIENT)
Dept: INTERNAL MEDICINE | Facility: CLINIC | Age: 66
End: 2021-07-20

## 2021-07-20 NOTE — TELEPHONE ENCOUNTER
Caller: Stacey Meyer    Relationship to patient: Self    Best call back number: [phone]    Date of exposure: SUNDAY    Date of positive COVID19 test: N/A    Date if possible COVID19 exposure: Sunday (10 SECONDS OF EXPOSURE)    COVID19 symptoms: NONE    Date of initial quarantine:     Additional information or concerns: PATIENT CALLED IN AND SAID SHE IS FULLY VACCINATED BUT PASSED A FRIEND THAT IS ALSO FULL VACCINATED THAT HAD GOT COVID. SHE SAID THERE WAS A 10 SECOND PERIOD THAT SHE WAS AROUND HER. SHE DOES NOT HAVE ANY SYMPTOMS BUT IS VERY CONCERNED ABOUT IF SHE NEEDS TO QUARANTINE.    What is the patients preferred pharmacy:

## 2021-07-20 NOTE — TELEPHONE ENCOUNTER
Informed patient that she does not have to quarantine, that she has been vaccinated but it is not a guarantee she will not get covid, to watch herself for any symptoms. That she did not need to get tested. She understood

## 2021-07-28 RX ORDER — LEVOTHYROXINE SODIUM 112 UG/1
TABLET ORAL
Qty: 90 TABLET | Refills: 0 | Status: SHIPPED | OUTPATIENT
Start: 2021-07-28 | End: 2021-10-20

## 2021-08-20 ENCOUNTER — OFFICE VISIT (OUTPATIENT)
Dept: INTERNAL MEDICINE | Facility: CLINIC | Age: 66
End: 2021-08-20

## 2021-08-20 VITALS
BODY MASS INDEX: 21.87 KG/M2 | OXYGEN SATURATION: 99 % | TEMPERATURE: 96.8 F | RESPIRATION RATE: 16 BRPM | SYSTOLIC BLOOD PRESSURE: 138 MMHG | WEIGHT: 111.4 LBS | HEIGHT: 60 IN | HEART RATE: 66 BPM | DIASTOLIC BLOOD PRESSURE: 72 MMHG

## 2021-08-20 DIAGNOSIS — M06.09 RHEUMATOID ARTHRITIS OF MULTIPLE SITES WITH NEGATIVE RHEUMATOID FACTOR (HCC): Chronic | ICD-10-CM

## 2021-08-20 DIAGNOSIS — Z00.00 ANNUAL PHYSICAL EXAM: Primary | ICD-10-CM

## 2021-08-20 DIAGNOSIS — E03.9 ACQUIRED HYPOTHYROIDISM: Chronic | ICD-10-CM

## 2021-08-20 DIAGNOSIS — I10 ESSENTIAL HYPERTENSION: Chronic | ICD-10-CM

## 2021-08-20 PROBLEM — N30.90 CYSTITIS: Status: RESOLVED | Noted: 2018-10-09 | Resolved: 2021-08-20

## 2021-08-20 PROCEDURE — 99396 PREV VISIT EST AGE 40-64: CPT | Performed by: NURSE PRACTITIONER

## 2021-08-20 NOTE — PROGRESS NOTES
Chief Complaint  Annual Exam     Subjective:      History of Present Illness {CC  Problem List  Visit  Diagnosis   Encounters  Notes  Medications  Labs  Result Review Imaging  Media :23}     Stacey Meyer presents to [facility] for annual exam excluding GYN exam.     She has  chronic medical conditions including: Hypertension, hypothyroid, rheumatoid arthritis    She had R hand surgery 7/2020 and had left done recently.   Continues PT - states doing great.     Hypertension: off medication and BP has been controlled.   RA: continues plaquenil.  Followed by rheum.   Hypothyroid: feels doing well on current dose.     She has had both COVID vaccines.   Immunocompromised: advised booster.     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  Advised face shield as well.     Stacey is here for coordination of medical care, to discuss health maintenance, disease prevention as well as to followup on medical problems.     Patient Care Team:  Kayli Tucker III NPYayaC as PCP - General (Family Medicine)  Danny Costa MD as Consulting Physician (Gastroenterology)  Katie Howell MD as Consulting Physician (Obstetrics and Gynecology)  Alessandro Galeano MD as Consulting Physician (Rheumatology)     Activity level is heavy.   Exercises 7 per week.     Weight trend is     Wt Readings from Last 4 Encounters:   08/20/21 50.5 kg (111 lb 6.4 oz)   06/09/21 49.9 kg (110 lb)   02/19/21 50.8 kg (112 lb)   11/19/20 49.9 kg (110 lb 0.2 oz)         Health Maintenance Female:    · GYN: [facility]: KISHORE Howell   · Last gynecology appointment:   · Patient's last mammogram was negative. Due in the Fall.   · Advised routine self-breast exams monthly.      Colon cancer screen:     She has no change in bowel habits.   Patient's last colonoscopy was 2019.   She was advised to repeat in 5 years.  2014: Dr Costa     Last eye exam: utd     Advised regular sunscreen.      Her  cardiovascular risks are:     [] No Known risk factors    [x] Hypertension   [] Hyperlipidemia  [] Diabetes    [] Obesity  [] Family history   [] Current or hx tobacco use  [] Sedentary lifestyle   [] Post-menopausal      Objective:      Physical Exam  Vitals reviewed.   Constitutional:       Appearance: Normal appearance. She is well-developed.   HENT:      Head: Normocephalic.      Nose: Nose normal.      Mouth/Throat:      Pharynx: Uvula midline.   Eyes:      Conjunctiva/sclera: Conjunctivae normal.      Pupils: Pupils are equal, round, and reactive to light.   Neck:      Thyroid: No thyromegaly.   Cardiovascular:      Rate and Rhythm: Normal rate and regular rhythm.      Pulses: Normal pulses.      Heart sounds: Normal heart sounds, S1 normal and S2 normal. No murmur heard.     Pulmonary:      Effort: Pulmonary effort is normal.      Breath sounds: Normal breath sounds.   Chest:      Chest wall: No deformity.   Abdominal:      General: Bowel sounds are normal.      Palpations: Abdomen is soft.      Tenderness: There is no abdominal tenderness. Negative signs include Lees's sign.   Genitourinary:     Comments: Deferred   Musculoskeletal:         General: Normal range of motion.      Cervical back: Normal range of motion and neck supple.   Lymphadenopathy:      Cervical: No cervical adenopathy.   Skin:     General: Skin is warm and dry.      Capillary Refill: Capillary refill takes 2 to 3 seconds.      Comments: Surgical scars bl hands - healing well    Neurological:      General: No focal deficit present.      Mental Status: She is alert and oriented to person, place, and time.      Cranial Nerves: No cranial nerve deficit.      Sensory: No sensory deficit.      Coordination: Coordination normal.   Psychiatric:         Speech: Speech normal.         Behavior: Behavior normal. Behavior is cooperative.         Thought Content: Thought content normal.         Judgment: Judgment normal.        Result Review  Data  "Reviewed:{ Labs  Result Review  Imaging  Med Tab  Media :23}   The following data was reviewed by: Kayli Tucker III, NP-C on 08/20/2021  Lab Results - Last 18 Months   Lab Units 02/19/21  1201 09/15/20  0814 05/18/20  1406   GLUCOSE mg/dL 89  --   --    BUN mg/dL 12  --   --    CREATININE mg/dL 0.75 0.70 0.7   EGFR IF NONAFRICN AM mL/min/1.73 78  --   --    EGFR IF AFRICN AM mL/min/1.73 95  --   --    SODIUM mmol/L 137  --   --    POTASSIUM mmol/L 4.6  --   --    CHLORIDE mmol/L 99  --   --    CALCIUM mg/dL 10.3  --   --    AST (SGOT) U/L  --  35 39   ALT (SGPT) U/L  --  17 15   WBC 10*3/mm3 6.40 5.31 6.05   RBC 10*6/mm3 4.42 4.40 4.24   HEMATOCRIT % 39.5 39.8 40.2   MCV fL 89.4 90.5 94.8   MCH pg 29.4 29.5 30.4   TSH uIU/mL 0.279  --   --    FREE T4 ng/dL 1.65  --   --           Vital Signs:   /72 (BP Location: Left arm, Patient Position: Sitting, Cuff Size: Adult)   Pulse 66   Temp 96.8 °F (36 °C) (Temporal)   Resp 16   Ht 152.4 cm (60\")   Wt 50.5 kg (111 lb 6.4 oz)   SpO2 99%   BMI 21.76 kg/m²         Requested Prescriptions      No prescriptions requested or ordered in this encounter     Routine medications provided by this office will also be refilled via pharmacy request.       Current Outpatient Medications:   •  hydroxychloroquine (PLAQUENIL) 200 MG tablet, Take 200 mg by mouth Daily. PT HAS APPT WITH MD TOMORROW AND WILL ASK IF NEEDS TO HOLD FOR SURGERY, Disp: , Rfl:   •  levothyroxine (SYNTHROID, LEVOTHROID) 112 MCG tablet, TAKE 1 TABLET BY MOUTH EVERY DAY, Disp: 90 tablet, Rfl: 0  •  triamcinolone (KENALOG) 0.5 % cream, Apply to affected area(s) 2 times a day, Disp: 15 g, Rfl: 0     Assessment and Plan:      Assessment and Plan {CC Problem List  Visit Diagnosis  ROS  Review (Popup)  Health Maintenance  Quality  BestPractice  Medications  SmartSets  SnapShot Encounters  Media :23}     Problem List Items Addressed This Visit        Cardiac and Vasculature    " Hypertension (Chronic)    Current Assessment & Plan     Hypertension is improving with lifestyle modifications.  Continue current treatment regimen.  Dietary sodium restriction.  Regular aerobic exercise.  Blood pressure will be reassessed at the next regular appointment.         Relevant Orders    Comprehensive Metabolic Panel    CBC (No Diff)       Endocrine and Metabolic    Acquired hypothyroidism (Chronic)    Current Assessment & Plan     Stable   Check lab for ongoing therapeutic drug monitoring          Relevant Orders    TSH    T4, free       Musculoskeletal and Injuries    Rheumatoid arthritis of multiple sites with negative rheumatoid factor (CMS/HCC) (Chronic)    Overview     Seronegative RA - has been seen by Dr Froylan Damon Humira 2020: no improvement, discontinued            Other Visit Diagnoses     Annual physical exam    -  Primary    Relevant Orders    Comprehensive Metabolic Panel    CBC (No Diff)    TSH    T4, free          Follow Up {Instructions Charge Capture  Follow-up Communications :23}     Return in about 6 months (around 2/20/2022).    Patient was given instructions and counseling regarding her condition or for health maintenance advice. Please see specific information pulled into the AVS if appropriate.    Dragon disclaimer:   Much of this encounter note is an electronic transcription/translation of spoken language to printed text. The electronic translation of spoken language may permit erroneous, or at times, nonsensical words or phrases to be inadvertently transcribed; Although I have reviewed the note for such errors, some may still exist.     Additional Patient Counseling:       Patient Instructions     It's Summer Time!    Stay hydrated when outside  If your urine starts to get darker, you are not drinking enough     Protect yourself from ticks and mosquitos  Here are the best ingredients to look for:  · DEET (N,N-diethyl-m-toluamide or  N,N-diethyl-3-methyl-benzamide)  · Picaridin  · Oil of lemon eucalyptus (p-menthane-3,8-diol or PMD)  Wear light-colored pants so you can spot ticks easier  If you use a permethrin product, ONLY apply to clothing    Practice Safe Sun!    · Use sun screen SPF >30 daily, reapply regularly per directions on package  · See dermatologist for skin check regularly  · Protect your eyes with sunglasses with UV protection    Poison Ivy  If you are going into areas that may have poison ivy, prepare with a product like IvyX or other ivy blocker.  Wash your clothes and pets after being in an area with ivy when returning home. If you come in contact with poison ivy, try a product like Technu     Other things you should incorporate all year...     Diet:    • Eat vegetables, fruits, whole grain, low-fat dairy, poultry, fish, beans, nontropical vegetable oils, and nuts, but avoid red meat (i.e., Mediterranean-style diet, DASH [Dietary Approaches to Stop Hypertension] diet).  • Limit sugary drinks and sweets.  • Limit saturated and trans fat to 5% to 6% of calories.  • Limit sodium intake to 2,400 mg daily (about one teaspoon table salt [kosher/sea salt have less sodium per teaspoon]).  Weight loss / Calorie Counting Apps:    • Lose It!   • MyFitLiving Proof Pal   • Works great when you try it with a partner/ friend. It takes about 15 minutes a day but studies show that this simple method of monitoring your intake can help you achieve goals as it keeps you accountable.  I often will ask patients to try these apps just to get an idea of how much sodium and how many carbohydrates you are taking in.   Exercise:   • Engage in moderate-to-vigorous aerobic activity for at least 40 minutes (on average) three to four times each week.  Wearables:   • Activity tracker   • Step tracker: getting 7,500 steps daily can cut your cardiac risks by 44%   Bone Health:   • [email or web address]/patients/treatment/[email or web address]  • Routine weight bearing  exercise      If you have not yet had your COVID vaccine, I highly recommend you schedule to have one ASAP.   You are not only protecting your health but you are protecting a love one that may be more vulnerable than you if they were to contract the virus.     If you are vaccinated, please be advised you can still contract COVID.   The goal of the vaccine is to decrease severe outcomes which so far it has been very effective.

## 2021-08-20 NOTE — PATIENT INSTRUCTIONS
It's Summer Time!    Stay hydrated when outside  If your urine starts to get darker, you are not drinking enough     Protect yourself from ticks and mosquitos  Here are the best ingredients to look for:  · DEET (N,N-diethyl-m-toluamide or N,N-diethyl-3-methyl-benzamide)  · Picaridin  · Oil of lemon eucalyptus (p-menthane-3,8-diol or PMD)  Wear light-colored pants so you can spot ticks easier  If you use a permethrin product, ONLY apply to clothing    Practice Safe Sun!    · Use sun screen SPF >30 daily, reapply regularly per directions on package  · See dermatologist for skin check regularly  · Protect your eyes with sunglasses with UV protection    Poison Ivy  If you are going into areas that may have poison ivy, prepare with a product like IvyX or other ivy blocker.  Wash your clothes and pets after being in an area with ivy when returning home. If you come in contact with poison ivy, try a product like Technu     Other things you should incorporate all year...     Diet:    • Eat vegetables, fruits, whole grain, low-fat dairy, poultry, fish, beans, nontropical vegetable oils, and nuts, but avoid red meat (i.e., Mediterranean-style diet, DASH [Dietary Approaches to Stop Hypertension] diet).  • Limit sugary drinks and sweets.  • Limit saturated and trans fat to 5% to 6% of calories.  • Limit sodium intake to 2,400 mg daily (about one teaspoon table salt [kosher/sea salt have less sodium per teaspoon]).  Weight loss / Calorie Counting Apps:    • Lose It!   • MyFitPhoRent Pal   • Works great when you try it with a partner/ friend. It takes about 15 minutes a day but studies show that this simple method of monitoring your intake can help you achieve goals as it keeps you accountable.  I often will ask patients to try these apps just to get an idea of how much sodium and how many carbohydrates you are taking in.   Exercise:   • Engage in moderate-to-vigorous aerobic activity for at least 40 minutes (on average) three to  four times each week.  Wearables:   • Activity tracker   • Step tracker: getting 7,500 steps daily can cut your cardiac risks by 44%   Bone Health:   • [email or web address]://www.nof.org/patients/treatment/nutrition/  • Routine weight bearing exercise      If you have not yet had your COVID vaccine, I highly recommend you schedule to have one ASAP.   You are not only protecting your health but you are protecting a love one that may be more vulnerable than you if they were to contract the virus.     If you are vaccinated, please be advised you can still contract COVID.   The goal of the vaccine is to decrease severe outcomes which so far it has been very effective.

## 2021-08-21 LAB
ALBUMIN SERPL-MCNC: 4.5 G/DL (ref 3.5–5.2)
ALBUMIN/GLOB SERPL: 2.1 G/DL
ALP SERPL-CCNC: 65 U/L (ref 39–117)
ALT SERPL-CCNC: 16 U/L (ref 1–33)
AST SERPL-CCNC: 28 U/L (ref 1–32)
BILIRUB SERPL-MCNC: 0.6 MG/DL (ref 0–1.2)
BUN SERPL-MCNC: 12 MG/DL (ref 8–23)
BUN/CREAT SERPL: 16 (ref 7–25)
CALCIUM SERPL-MCNC: 10.3 MG/DL (ref 8.6–10.5)
CHLORIDE SERPL-SCNC: 102 MMOL/L (ref 98–107)
CO2 SERPL-SCNC: 26.2 MMOL/L (ref 22–29)
CREAT SERPL-MCNC: 0.75 MG/DL (ref 0.57–1)
ERYTHROCYTE [DISTWIDTH] IN BLOOD BY AUTOMATED COUNT: 13.4 % (ref 12.3–15.4)
GLOBULIN SER CALC-MCNC: 2.1 GM/DL
GLUCOSE SERPL-MCNC: 91 MG/DL (ref 65–99)
HCT VFR BLD AUTO: 39.7 % (ref 34–46.6)
HGB BLD-MCNC: 13.5 G/DL (ref 12–15.9)
MCH RBC QN AUTO: 29.9 PG (ref 26.6–33)
MCHC RBC AUTO-ENTMCNC: 34 G/DL (ref 31.5–35.7)
MCV RBC AUTO: 87.8 FL (ref 79–97)
PLATELET # BLD AUTO: 365 10*3/MM3 (ref 140–450)
POTASSIUM SERPL-SCNC: 4.3 MMOL/L (ref 3.5–5.2)
PROT SERPL-MCNC: 6.6 G/DL (ref 6–8.5)
RBC # BLD AUTO: 4.52 10*6/MM3 (ref 3.77–5.28)
SODIUM SERPL-SCNC: 136 MMOL/L (ref 136–145)
T4 FREE SERPL-MCNC: 1.16 NG/DL (ref 0.93–1.7)
TSH SERPL DL<=0.005 MIU/L-ACNC: 7.62 UIU/ML (ref 0.27–4.2)
WBC # BLD AUTO: 5.64 10*3/MM3 (ref 3.4–10.8)

## 2021-09-27 ENCOUNTER — OFFICE VISIT (OUTPATIENT)
Dept: ORTHOPEDIC SURGERY | Facility: CLINIC | Age: 66
End: 2021-09-27

## 2021-09-27 DIAGNOSIS — M17.0 PRIMARY OSTEOARTHRITIS OF BOTH KNEES: ICD-10-CM

## 2021-09-27 DIAGNOSIS — R52 PAIN: Primary | ICD-10-CM

## 2021-09-27 PROCEDURE — 73562 X-RAY EXAM OF KNEE 3: CPT | Performed by: NURSE PRACTITIONER

## 2021-09-27 PROCEDURE — 99213 OFFICE O/P EST LOW 20 MIN: CPT | Performed by: NURSE PRACTITIONER

## 2021-09-27 PROCEDURE — 20610 DRAIN/INJ JOINT/BURSA W/O US: CPT | Performed by: NURSE PRACTITIONER

## 2021-09-27 RX ORDER — LIDOCAINE HYDROCHLORIDE 20 MG/ML
2 INJECTION, SOLUTION EPIDURAL; INFILTRATION; INTRACAUDAL; PERINEURAL
Status: COMPLETED | OUTPATIENT
Start: 2021-09-27 | End: 2021-09-27

## 2021-09-27 RX ORDER — METHYLPREDNISOLONE ACETATE 80 MG/ML
80 INJECTION, SUSPENSION INTRA-ARTICULAR; INTRALESIONAL; INTRAMUSCULAR; SOFT TISSUE
Status: COMPLETED | OUTPATIENT
Start: 2021-09-27 | End: 2021-09-27

## 2021-09-27 RX ADMIN — METHYLPREDNISOLONE ACETATE 80 MG: 80 INJECTION, SUSPENSION INTRA-ARTICULAR; INTRALESIONAL; INTRAMUSCULAR; SOFT TISSUE at 08:57

## 2021-09-27 RX ADMIN — LIDOCAINE HYDROCHLORIDE 2 ML: 20 INJECTION, SOLUTION EPIDURAL; INFILTRATION; INTRACAUDAL; PERINEURAL at 08:57

## 2021-09-27 NOTE — PROGRESS NOTES
Patient: Stacey Meyer  YOB: 1955 66 y.o. female  Medical Record Number: [id number]    Chief Complaints:   Chief Complaint   Patient presents with   • Left Knee - new problem, Pain   • Right Knee - NEW PROBLEM, Pain       History of Present Illness:Stacey Meyer is a 66 y.o. female who presents with new complaints of bilateral knee pain left greater than right.  The patient reports she has had intermittent aching in both knees for the last several months but they have gotten worse with playing more pickleball and also using her elliptical machine daily.  She denies any specific injury.  Denies any swelling.  She does have a history of rheumatoid arthritis.  She has tried over-the-counter anti-inflammatories but has to be careful because she has had a bleeding ulcer before.    Allergies:   Allergies   Allergen Reactions   • Erythromycin Rash   • Metronidazole Rash and Swelling   • Oxycodone-Aspirin Nausea And Vomiting and Nausea Only   • Sulfa Antibiotics Rash   • Tetracycline Rash   • Tetracyclines & Related Rash       Medications:   Current Outpatient Medications   Medication Sig Dispense Refill   • hydroxychloroquine (PLAQUENIL) 200 MG tablet Take 200 mg by mouth Daily. PT HAS APPT WITH MD TOMORROW AND WILL ASK IF NEEDS TO HOLD FOR SURGERY     • levothyroxine (SYNTHROID, LEVOTHROID) 112 MCG tablet TAKE 1 TABLET BY MOUTH EVERY DAY 90 tablet 0     No current facility-administered medications for this visit.         The following portions of the patient's history were reviewed and updated as appropriate: allergies, current medications, past family history, past medical history, past social history, past surgical history and problem list.    Review of Systems:   A 14 point review of systems was performed. All systems negative except pertinent positives/negative listed in HPI above    Physical Exam:   Weight 110, temperature 98.6, height 5 1    General: A and O x 3, ASA, NAD    SCLERA:    Normal  Skin  clear no unusual lesions noted  Bilateral knees patient has no appreciable effusion 125 degrees flexion neutral in extension with some mild patellofemoral crepitus noted.  Negative Lockman negative Flor calf is soft and nontender         Radiology:  Xrays 3views (ap,lateral, sunrise) bilateral knees were ordered and reviewed today secondary to pain and show patellofemoral arthritic changes.  No compared to views available    Assessment/Plan: Osteoarthritis bilateral knees    Patient discussed options, we will proceed with bilateral knee cortisone injections, refer the patient not to physical therapy, scheduled Tylenol.  Patient and I also discussed viscosupplementation if the cortisone injections do not help long-term.  She will otherwise follow-up with me as needed    Large Joint Arthrocentesis: R knee  Date/Time: 9/27/2021 8:57 AM  Consent given by: patient  Site marked: site marked  Timeout: Immediately prior to procedure a time out was called to verify the correct patient, procedure, equipment, support staff and site/side marked as required   Supporting Documentation  Indications: pain and joint swelling   Procedure Details  Location: knee - R knee  Preparation: Patient was prepped and draped in the usual sterile fashion  Needle size: 22 G  Approach: anterolateral  Medications administered: 80 mg methylPREDNISolone acetate 80 MG/ML; 2 mL lidocaine PF 2% 2 %  Patient tolerance: patient tolerated the procedure well with no immediate complications    Large Joint Arthrocentesis: L knee  Date/Time: 9/27/2021 8:57 AM  Consent given by: patient  Site marked: site marked  Timeout: Immediately prior to procedure a time out was called to verify the correct patient, procedure, equipment, support staff and site/side marked as required   Supporting Documentation  Indications: pain and joint swelling   Procedure Details  Location: knee - L knee  Preparation: Patient was prepped and draped in the usual sterile  fashion  Needle size: 22 G  Approach: anterolateral  Medications administered: 80 mg methylPREDNISolone acetate 80 MG/ML; 2 mL lidocaine PF 2% 2 %  Patient tolerance: patient tolerated the procedure well with no immediate complications            Lizy Mendoza, APRN  9/27/2021  Answers for HPI/ROS submitted by the patient on 9/21/2021  Please describe your symptoms.: Left knee pain  Have you had these symptoms before?: Yes  How long have you been having these symptoms?: 5-7 days  What is the primary reason for your visit?: Other

## 2021-10-20 RX ORDER — LEVOTHYROXINE SODIUM 112 UG/1
TABLET ORAL
Qty: 90 TABLET | Refills: 1 | Status: SHIPPED | OUTPATIENT
Start: 2021-10-20 | End: 2022-02-01

## 2022-02-01 RX ORDER — LEVOTHYROXINE SODIUM 112 UG/1
TABLET ORAL
Qty: 90 TABLET | Refills: 1 | Status: SHIPPED | OUTPATIENT
Start: 2022-02-01 | End: 2023-03-31

## 2022-02-23 ENCOUNTER — APPOINTMENT (OUTPATIENT)
Dept: WOMENS IMAGING | Facility: HOSPITAL | Age: 67
End: 2022-02-23

## 2022-02-23 PROCEDURE — 77067 SCR MAMMO BI INCL CAD: CPT | Performed by: RADIOLOGY

## 2022-02-23 PROCEDURE — 77063 BREAST TOMOSYNTHESIS BI: CPT | Performed by: RADIOLOGY

## 2022-02-25 ENCOUNTER — OFFICE VISIT (OUTPATIENT)
Dept: INTERNAL MEDICINE | Facility: CLINIC | Age: 67
End: 2022-02-25

## 2022-02-25 VITALS
WEIGHT: 109 LBS | DIASTOLIC BLOOD PRESSURE: 84 MMHG | SYSTOLIC BLOOD PRESSURE: 126 MMHG | OXYGEN SATURATION: 100 % | HEART RATE: 73 BPM | BODY MASS INDEX: 21.4 KG/M2 | HEIGHT: 60 IN

## 2022-02-25 DIAGNOSIS — I10 PRIMARY HYPERTENSION: Chronic | ICD-10-CM

## 2022-02-25 DIAGNOSIS — M06.09 RHEUMATOID ARTHRITIS OF MULTIPLE SITES WITH NEGATIVE RHEUMATOID FACTOR: Chronic | ICD-10-CM

## 2022-02-25 DIAGNOSIS — G25.81 RESTLESS LEG SYNDROME: ICD-10-CM

## 2022-02-25 DIAGNOSIS — E03.9 ACQUIRED HYPOTHYROIDISM: Primary | Chronic | ICD-10-CM

## 2022-02-25 PROCEDURE — 99214 OFFICE O/P EST MOD 30 MIN: CPT | Performed by: NURSE PRACTITIONER

## 2022-02-25 RX ORDER — ROPINIROLE 0.5 MG/1
0.5 TABLET, FILM COATED ORAL NIGHTLY
Qty: 30 TABLET | Refills: 1 | Status: SHIPPED | OUTPATIENT
Start: 2022-02-25 | End: 2022-03-31

## 2022-02-25 NOTE — PATIENT INSTRUCTIONS
Diet:    • Eat vegetables, fruits, whole grain, low-fat dairy, poultry, fish, beans, nontropical vegetable oils, and nuts, but avoid red meat (i.e., Mediterranean-style diet, DASH [Dietary Approaches to Stop Hypertension] diet).  • Limit sugary drinks and sweets.  • Limit saturated and trans fat to 5% to 6% of calories.  • Limit sodium intake to 2,400 mg daily (about one teaspoon table salt [kosher/sea salt have less sodium per teaspoon]).  Weight loss / Calorie Counting Apps:    • Lose It!   • [employer] Pal   • Works great when you try it with a partner/ friend  Exercise:   • Engage in moderate-to-vigorous aerobic activity for at least 40 minutes (on average) three to four times each week.  Wearables:   • Activity tracker   • Step tracker   Skin Care:   • Use sun screen SPF >30 daily  • Dermatologist for skin check regularly  Bone Health:   • [email or web address]://[email or web address]/patients/treatment/nutrition/    CDC recommends Flu vaccines for everyone 6 months and older every season with rare exceptions.      Vaccines:     Shingles vaccine is given in TWO separate injections.   CDC recommends that healthy adults 50 years and older get two doses of the shingles vaccine called Shingrix (recombinant zoster vaccine),  by 2 to 6 months, to prevent shingles and the complications from the disease.

## 2022-02-25 NOTE — PROGRESS NOTES
Chief Complaint  Hypertension     Subjective:      History of Present Illness {CC  Problem List  Visit  Diagnosis   Encounters  Notes  Medications  Labs  Result Review Imaging  Media :23}     Stacey Meyer presents to [facility] for follow up:     1) hypothyroid: chronic.  Continues synthroid  2) RA: chronic and continues plaquenil    Hands doing well after surgery.   3) hypertension:   4) RLS - at times   States insomnia improved during pandemic. However she takes z-quil.  RLS tends to occur on the nights she takes that.  Advised it could be causing restless leg symptoms.      She had annual with [facility] 2/23/22: Katie Howell.  Note reviewed.     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  Full vaccinated.     I have reviewed patient's medical history, any new submitted information provided by patient or medical assistant and updated medical record.      Objective:      Physical Exam  Vitals reviewed.   Constitutional:       Appearance: Normal appearance. She is well-developed.      Comments: Appears euthyroid    HENT:      Head:      Comments: Wearing mask due to COVID   Neck:      Thyroid: No thyromegaly.   Cardiovascular:      Rate and Rhythm: Normal rate and regular rhythm.      Pulses: Normal pulses.      Heart sounds: Normal heart sounds.   Pulmonary:      Effort: Pulmonary effort is normal.      Breath sounds: Normal breath sounds.      Comments: E/U   Musculoskeletal:         General: Normal range of motion.      Cervical back: Normal range of motion and neck supple.   Lymphadenopathy:      Cervical: No cervical adenopathy.   Skin:     General: Skin is warm and dry.      Capillary Refill: Capillary refill takes 2 to 3 seconds.   Neurological:      Mental Status: She is alert and oriented to person, place, and time.   Psychiatric:         " Mood and Affect: Mood normal.         Behavior: Behavior normal. Behavior is cooperative.         Thought Content: Thought content normal.         Judgment: Judgment normal.        Result Review  Data Reviewed:{ Labs  Result Review  Imaging  Med Tab  Media :23}     The following data was reviewed by: Kayli Tucker III, NP-C on 02/25/2022  Common labs    Common Labsle 8/20/21 8/20/21    1220 1220   Glucose 91    BUN 12    Creatinine 0.75    eGFR Non  Am 78    eGFR African Am 95    Sodium 136    Potassium 4.3    Chloride 102    Calcium 10.3    Total Protein 6.6    Albumin 4.50    Total Bilirubin 0.6    Alkaline Phosphatase 65    AST (SGOT) 28    ALT (SGPT) 16    WBC  5.64   Hemoglobin  13.5   Hematocrit  39.7   Platelets  365      Comments are available for some flowsheets but are not being displayed.                  Vital Signs:   /84 (BP Location: Left arm, Patient Position: Sitting, Cuff Size: Adult)   Pulse 73   Ht 152.4 cm (60\")   Wt 49.4 kg (109 lb)   SpO2 100%   BMI 21.29 kg/m²         Requested Prescriptions     Signed Prescriptions Disp Refills   • rOPINIRole (Requip) 0.5 MG tablet 30 tablet 1     Sig: Take 1 tablet by mouth Every Night. Take 1 hour before bedtime.       Routine medications provided by this office will also be refilled via pharmacy request.       Current Outpatient Medications:   •  hydroxychloroquine (PLAQUENIL) 200 MG tablet, Take 200 mg by mouth Daily. PT HAS APPT WITH MD TOMORROW AND WILL ASK IF NEEDS TO HOLD FOR SURGERY, Disp: , Rfl:   •  levothyroxine (SYNTHROID, LEVOTHROID) 112 MCG tablet, TAKE 1 TABLET BY MOUTH EVERY DAY, Disp: 90 tablet, Rfl: 1  •  rOPINIRole (Requip) 0.5 MG tablet, Take 1 tablet by mouth Every Night. Take 1 hour before bedtime., Disp: 30 tablet, Rfl: 1     Assessment and Plan:      Assessment and Plan {CC Problem List  Visit Diagnosis  ROS  Review (Popup)  Health Maintenance  Quality  BestPractice  Medications  " SmartSets  SnapShot Encounters  Media :23}     Problem List Items Addressed This Visit        Cardiac and Vasculature    Hypertension (Chronic)    Overview     Prior treatment:  Avalide, losartan          Current Assessment & Plan     Hypertension is improving with lifestyle modifications.  Continue current treatment regimen.  Dietary sodium restriction.  Regular aerobic exercise.  Continue current medications.  Blood pressure will be reassessed at the next regular appointment.            Endocrine and Metabolic    Acquired hypothyroidism - Primary (Chronic)    Current Assessment & Plan     Stable   Check lab for ongoing therapeutic drug monitoring     Lab Results   Component Value Date    TSH 7.620 (H) 08/20/2021            Relevant Orders    TSH    T4, free       Musculoskeletal and Injuries    Rheumatoid arthritis of multiple sites with negative rheumatoid factor (HCC) (Chronic)    Overview     Seronegative RA - has been seen by Dr Galeano  Tried Humira 2020: no improvement, discontinued            Other Visit Diagnoses     Restless leg syndrome        ? related to z-quil.  check magnesium and will trial requip.     Relevant Medications    rOPINIRole (Requip) 0.5 MG tablet    Other Relevant Orders    Magnesium          Requip: I discussed medication use, dosing and possible side effects.   Patient was given opportunity to ask any questions.     Follow Up {Instructions Charge Capture  Follow-up Communications :23}     Return in about 6 months (around 8/25/2022) for Annual physical.    Patient was given instructions and counseling regarding her condition or for health maintenance advice. Please see specific information pulled into the AVS if appropriate.    Sondra disclaimer:   Much of this encounter note is an electronic transcription/translation of spoken language to printed text. The electronic translation of spoken language may permit erroneous, or at times, nonsensical words or phrases to be  inadvertently transcribed; Although I have reviewed the note for such errors, some may still exist.     Additional Patient Counseling:       Patient Instructions   Diet:    • Eat vegetables, fruits, whole grain, low-fat dairy, poultry, fish, beans, nontropical vegetable oils, and nuts, but avoid red meat (i.e., Mediterranean-style diet, DASH [Dietary Approaches to Stop Hypertension] diet).  • Limit sugary drinks and sweets.  • Limit saturated and trans fat to 5% to 6% of calories.  • Limit sodium intake to 2,400 mg daily (about one teaspoon table salt [kosher/sea salt have less sodium per teaspoon]).  Weight loss / Calorie Counting Apps:    • Lose It!   • [employer] Pal   • Works great when you try it with a partner/ friend  Exercise:   • Engage in moderate-to-vigorous aerobic activity for at least 40 minutes (on average) three to four times each week.  Wearables:   • Activity tracker   • Step tracker   Skin Care:   • Use sun screen SPF >30 daily  • Dermatologist for skin check regularly  Bone Health:   • Https://[email or web address].org/patients/treatment/nutrition/    CDC recommends Flu vaccines for everyone 6 months and older every season with rare exceptions.      Vaccines:     Shingles vaccine is given in TWO separate injections.   CDC recommends that healthy adults 50 years and older get two doses of the shingles vaccine called Shingrix (recombinant zoster vaccine),  by 2 to 6 months, to prevent shingles and the complications from the disease.

## 2022-02-25 NOTE — ASSESSMENT & PLAN NOTE
Stable   Check lab for ongoing therapeutic drug monitoring     Lab Results   Component Value Date    TSH 7.620 (H) 08/20/2021

## 2022-02-25 NOTE — ASSESSMENT & PLAN NOTE
Hypertension is improving with lifestyle modifications.  Continue current treatment regimen.  Dietary sodium restriction.  Regular aerobic exercise.  Continue current medications.  Blood pressure will be reassessed at the next regular appointment.

## 2022-02-26 LAB
MAGNESIUM SERPL-MCNC: 2.2 MG/DL (ref 1.6–2.3)
T4 FREE SERPL-MCNC: 1.77 NG/DL (ref 0.82–1.77)
TSH SERPL DL<=0.005 MIU/L-ACNC: 2.57 UIU/ML (ref 0.45–4.5)

## 2022-03-31 DIAGNOSIS — G25.81 RESTLESS LEG SYNDROME: ICD-10-CM

## 2022-03-31 RX ORDER — ROPINIROLE 0.5 MG/1
0.5 TABLET, FILM COATED ORAL NIGHTLY
Qty: 30 TABLET | Refills: 1 | Status: SHIPPED | OUTPATIENT
Start: 2022-03-31 | End: 2022-05-02

## 2022-04-20 DIAGNOSIS — R05.9 COUGH: Primary | ICD-10-CM

## 2022-04-22 ENCOUNTER — HOSPITAL ENCOUNTER (OUTPATIENT)
Dept: GENERAL RADIOLOGY | Facility: HOSPITAL | Age: 67
Discharge: HOME | End: 2022-04-22
Admitting: NURSE PRACTITIONER

## 2022-04-22 DIAGNOSIS — R05.9 COUGH: ICD-10-CM

## 2022-04-22 DIAGNOSIS — R05.9 COUGH: Primary | ICD-10-CM

## 2022-04-22 PROCEDURE — 71046 X-RAY EXAM CHEST 2 VIEWS: CPT

## 2022-04-30 DIAGNOSIS — G25.81 RESTLESS LEG SYNDROME: ICD-10-CM

## 2022-05-02 RX ORDER — ROPINIROLE 0.5 MG/1
TABLET, FILM COATED ORAL
Qty: 30 TABLET | Refills: 1 | Status: SHIPPED | OUTPATIENT
Start: 2022-05-02 | End: 2022-05-27 | Stop reason: SDUPTHER

## 2022-05-27 DIAGNOSIS — G25.81 RESTLESS LEG SYNDROME: ICD-10-CM

## 2022-05-27 RX ORDER — ROPINIROLE 0.5 MG/1
0.5 TABLET, FILM COATED ORAL NIGHTLY
Qty: 90 TABLET | Refills: 0 | Status: SHIPPED | OUTPATIENT
Start: 2022-05-27 | End: 2022-08-26

## 2022-07-26 ENCOUNTER — OFFICE VISIT (OUTPATIENT)
Dept: ORTHOPEDIC SURGERY | Facility: CLINIC | Age: 67
End: 2022-07-26

## 2022-07-26 VITALS — WEIGHT: 105.8 LBS | TEMPERATURE: 97.8 F | HEIGHT: 60 IN | BODY MASS INDEX: 20.77 KG/M2

## 2022-07-26 DIAGNOSIS — R52 PAIN: Primary | ICD-10-CM

## 2022-07-26 DIAGNOSIS — M75.42 IMPINGEMENT SYNDROME OF LEFT SHOULDER: ICD-10-CM

## 2022-07-26 PROCEDURE — 99214 OFFICE O/P EST MOD 30 MIN: CPT | Performed by: ORTHOPAEDIC SURGERY

## 2022-07-26 PROCEDURE — 73030 X-RAY EXAM OF SHOULDER: CPT | Performed by: ORTHOPAEDIC SURGERY

## 2022-07-26 PROCEDURE — 20610 DRAIN/INJ JOINT/BURSA W/O US: CPT | Performed by: ORTHOPAEDIC SURGERY

## 2022-07-26 RX ORDER — METHYLPREDNISOLONE ACETATE 80 MG/ML
80 INJECTION, SUSPENSION INTRA-ARTICULAR; INTRALESIONAL; INTRAMUSCULAR; SOFT TISSUE
Status: COMPLETED | OUTPATIENT
Start: 2022-07-26 | End: 2022-07-26

## 2022-07-26 RX ORDER — LIDOCAINE HYDROCHLORIDE 10 MG/ML
4 INJECTION, SOLUTION EPIDURAL; INFILTRATION; INTRACAUDAL; PERINEURAL
Status: COMPLETED | OUTPATIENT
Start: 2022-07-26 | End: 2022-07-26

## 2022-07-26 RX ADMIN — LIDOCAINE HYDROCHLORIDE 4 ML: 10 INJECTION, SOLUTION EPIDURAL; INFILTRATION; INTRACAUDAL; PERINEURAL at 13:47

## 2022-07-26 RX ADMIN — METHYLPREDNISOLONE ACETATE 80 MG: 80 INJECTION, SUSPENSION INTRA-ARTICULAR; INTRALESIONAL; INTRAMUSCULAR; SOFT TISSUE at 13:47

## 2022-07-26 NOTE — PROGRESS NOTES
New Left Shoulder      Patient: Stacey Meyer        YOB: 1955    Medical Record Number: [id number]        Chief Complaints: Left shoulder pain      History of Present Illness: This is a 66-year-old female who presents complaining of left shoulder pain she is right-hand dominant she states it pops she gets sharp pain and hurts and then it does go away has been ongoing for couple months no history injury change in activity no night pain no history of similar symptoms she does have a diagnosis of RA with significant involvement to her hands.  Her symptoms are moderate intermittent aching worse with certain positions and activity somewhat better with rest past medical history in addition to the RA is depression peptic ulcer disease thyroid disease hypertension      Allergies:   Allergies   Allergen Reactions   • Erythromycin Rash   • Metronidazole Rash and Swelling   • Oxycodone-Aspirin Nausea And Vomiting and Nausea Only   • Sulfa Antibiotics Rash   • Tetracycline Rash   • Tetracyclines & Related Rash       Medications:   Home Medications:  Current Outpatient Medications on File Prior to Visit   Medication Sig   • hydroxychloroquine (PLAQUENIL) 200 MG tablet Take 200 mg by mouth Daily. PT HAS APPT WITH MD TOMORROW AND WILL ASK IF NEEDS TO HOLD FOR SURGERY   • levothyroxine (SYNTHROID, LEVOTHROID) 112 MCG tablet TAKE 1 TABLET BY MOUTH EVERY DAY   • brompheniramine-pseudoephedrine-DM 30-2-10 MG/5ML syrup Take 5 mL by mouth 4 (Four) Times a Day As Needed for Allergies.   • cefdinir (OMNICEF) 300 MG capsule Take 1 capsule by mouth 2 (Two) Times a Day.   • HYDROcodone-homatropine (HYCODAN) 5-1.5 MG/5ML syrup Take 2.5 mL by mouth Every 6 (Six) Hours As Needed for Cough.   • Menthol (HALLS COUGH DROPS MT) Apply  to the mouth or throat.   • methylPREDNISolone (MEDROL) 4 MG dose pack Take as directed on package instructions.   • rOPINIRole (REQUIP) 0.5 MG tablet Take 1 tablet by mouth Every Night. Take 1  "hour before bedtime.     No current facility-administered medications on file prior to visit.     Current Medications:  Scheduled Meds:  Continuous Infusions:No current facility-administered medications for this visit.    PRN Meds:.    Past Medical History:   Diagnosis Date   • Anxiety    • Degeneration, intervertebral disc, thoracic    • Depression    • Depression 6/4/2016   • Erosive osteoarthritis     Dr Galeano   • History of peptic ulcer    • History of transfusion     AFTER HIP REPLACEMENT    • Hypertension    • Knee swelling 9-9   • OA (osteoarthritis) of hip    • Osteoarthritis    • PONV (postoperative nausea and vomiting)    • Primary osteoarthritis of right hip    • Thyroid disease     HYPOTHYROID        Past Surgical History:   Procedure Laterality Date   • BILATERAL BREAST REDUCTION Bilateral 11/14/2018    Procedure: BREAST REDUCTION BILATERAL, NEW IMAGE;  Surgeon: Myranda Perea MD;  Location: [facility] MAIN OR;  Service: New Image   • BUNIONECTOMY Left    • COLONOSCOPY      2009   • COLONOSCOPY N/A 06/26/2019    Procedure: COLONOSCOPY WITH COLD POLYPECTOMY x 2;  Surgeon: Danny Costa MD;  Location: [facility] ENDOSCOPY;  Service: Gastroenterology   • ECTOPIC PREGNANCY SURGERY Bilateral     2 SEPARATE SURGERIES   • ENDOSCOPY N/A 05/24/2016    Procedure: ESOPHAGOGASTRODUODENOSCOPY WITH COLD BIOPSIES;  Surgeon: Danny Costa MD;  Location: [facility] ENDOSCOPY;  Service:    • ENDOSCOPY N/A 11/01/2016    Procedure: ESOPHAGOGASTRODUODENOSCOPY;  Surgeon: Danny Costa MD;  Location: [facility] ENDOSCOPY;  Service:    • FERTILITY SURGERY     • FOOT SURGERY Bilateral     \"THEY BROKE MY 5TH TOE ON BOTH SIDES AND STRIGHTENED THEM\"    • HAMMER TOE REPAIR Bilateral    • HAND SURGERY  04/28/2021    left hand    • JOINT REPLACEMENT Right 07/15/2020    hand   • TONSILLECTOMY     • TOTAL HIP ARTHROPLASTY Right 07/13/2016    Procedure: RIGHT TOTAL HIP ARTHROPLASTY ANTERIOR APPROACH;  Surgeon: Eric Clayton MD;  " "Location: [facility] OR;  Service:         Social History     Occupational History   • Occupation: teacher   Tobacco Use   • Smoking status: Former Smoker     Packs/day: 1.00     Years: 10.00     Pack years: 10.00     Types: Cigarettes     Quit date:      Years since quittin.5   • Smokeless tobacco: Never Used   Vaping Use   • Vaping Use: Never used   Substance and Sexual Activity   • Alcohol use: Not Currently     Alcohol/week: 0.0 standard drinks     Comment: Quit drinking 2019    • Drug use: No   • Sexual activity: Defer     Partners: Male      Social History     Social History Narrative   • Not on file        Family History   Problem Relation Age of Onset   • Liver disease Mother          at 69 yoa    • Autoimmune disease Mother    • Stroke Maternal Aunt    • Obesity Brother    • Malig Hyperthermia Neg Hx              Review of Systems:     Review of Systems      Physical Exam: 66 y.o. female  General Appearance:    Alert, cooperative, in no acute distress                   Vitals:    22 1315   Temp: 97.8 °F (36.6 °C)   Weight: 48 kg (105 lb 12.8 oz)   Height: 152.4 cm (60\")   PainSc:   5      Patient is alert and read ×3 no acute distress appears her above-listed at height weight and age.  Affect is normal respiratory rate is normal unlabored. Heart rate regular rate rhythm, sclera, dentition and hearing are normal for the purpose of this exam.    Ortho Exam Physical exam of the left shoulder reveals no overlying skin changes no lymphedema no lymphadenopathy.  Patient has active flexion 180 with mild symptoms abduction is similar external rotation is to 50 and internal rotation to the upper lumbar spine with mild symptoms.  Patient has good rotator cuff strength 4+ over 5 with isometric strength testing with pain.  Patient has a positive impingement and a positive Qureshi sign.  Patient has good cervical range of motion which is full and asymptomatic no radicular symptoms.  Patient has " a normal elbow exam.  Good distal pulses are presentPatient has pain with overhead activity and a positive Neer sign and a positive empty can sign  They have a positive drop arm any definitive painful arc      Large Joint Arthrocentesis: L subacromial bursa  Date/Time: 7/26/2022 1:47 PM  Consent given by: patient  Site marked: site marked  Timeout: Immediately prior to procedure a time out was called to verify the correct patient, procedure, equipment, support staff and site/side marked as required   Supporting Documentation  Indications: pain   Procedure Details  Location: shoulder - L subacromial bursa  Preparation: Patient was prepped and draped in the usual sterile fashion  Needle gauge: 21G.  Approach: posterior  Medications administered: 80 mg methylPREDNISolone acetate 80 MG/ML; 4 mL lidocaine PF 1% 1 %  Patient tolerance: patient tolerated the procedure well with no immediate complications                Radiology:   AP, Scapular Y and Axillary Lateral of the left shoulder were ordered/reviewed to evauate shoulder pain.  I have no comparative films she has some mild narrowing of the anterior aspect of the acromioclavicular joint but no obvious other acute pathology  Imaging Results (Most Recent)     Procedure Component Value Units Date/Time    XR Shoulder 2+ View Left [[id number]] Resulted: 07/26/22 1254     Updated: 07/26/22 1254    Impression:      Ordering physician's impression is located in the Encounter Note dated 07/26/22. X-ray performed in the DR room.          Assessment/Plan: Left shoulder pain I think this is rotator cuff in some fashion she has a history of ulcer so cannot take any anti-inflammatories.  I think is reasonable to proceed with a subacromial injection as a diagnostic and therapeutic tool she fails to improve she understands it with her RA her immune system would be a little bit decreased thereby increasing her risk of the injection  Would consider other means of testing.  We will  also institute physical therapy to work on cuff and core strengthening answers for HPI/ROS submitted by the patient on 7/24/2022  Please describe your symptoms.: Left shoulder pain  Have you had these symptoms before?: No  How long have you been having these symptoms?: Greater than 2 weeks  Please list any medications you are currently taking for this condition.: None  Please describe any probable cause for these symptoms. : Not sure  What is the primary reason for your visit?: Other

## 2022-08-25 DIAGNOSIS — G25.81 RESTLESS LEG SYNDROME: ICD-10-CM

## 2022-08-26 RX ORDER — ROPINIROLE 0.5 MG/1
0.5 TABLET, FILM COATED ORAL NIGHTLY
Qty: 90 TABLET | Refills: 0 | Status: SHIPPED | OUTPATIENT
Start: 2022-08-26

## 2023-03-31 RX ORDER — LEVOTHYROXINE SODIUM 112 UG/1
TABLET ORAL
Qty: 90 TABLET | Refills: 1 | Status: SHIPPED | OUTPATIENT
Start: 2023-03-31

## 2023-03-31 NOTE — TELEPHONE ENCOUNTER
Rx Refill Note  Requested Prescriptions     Pending Prescriptions Disp Refills   • levothyroxine (SYNTHROID, LEVOTHROID) 112 MCG tablet [Pharmacy Med Name: LEVOTHYROXINE 112 MCG TABLET] 90 tablet 1     Sig: TAKE 1 TABLET BY MOUTH EVERY DAY      Last office visit with prescribing clinician: 2/25/2022   Last telemedicine visit with prescribing clinician: 6/20/2023   Next office visit with prescribing clinician: 6/20/2023         Maya Mora MA  03/31/23, 08:37 EDT

## 2023-05-23 DIAGNOSIS — F41.9 ANXIETY: ICD-10-CM

## 2023-05-23 RX ORDER — LORAZEPAM 1 MG/1
1 TABLET ORAL EVERY 8 HOURS PRN
Qty: 8 TABLET | Refills: 0 | Status: SHIPPED | OUTPATIENT
Start: 2023-05-23

## 2023-06-26 PROBLEM — F41.8 DEPRESSION WITH ANXIETY: Chronic | Status: ACTIVE | Noted: 2023-06-26

## 2023-06-26 PROBLEM — F41.8 DEPRESSION WITH ANXIETY: Status: ACTIVE | Noted: 2023-06-26

## 2023-07-26 ENCOUNTER — OFFICE VISIT (OUTPATIENT)
Dept: INTERNAL MEDICINE | Facility: CLINIC | Age: 68
End: 2023-07-26
Payer: MEDICARE

## 2023-07-26 VITALS
HEIGHT: 60 IN | OXYGEN SATURATION: 100 % | DIASTOLIC BLOOD PRESSURE: 70 MMHG | WEIGHT: 107.6 LBS | HEART RATE: 67 BPM | SYSTOLIC BLOOD PRESSURE: 124 MMHG | BODY MASS INDEX: 21.13 KG/M2

## 2023-07-26 DIAGNOSIS — Z00.00 WELCOME TO MEDICARE PREVENTIVE VISIT: Primary | ICD-10-CM

## 2023-07-26 DIAGNOSIS — E03.9 ACQUIRED HYPOTHYROIDISM: Chronic | ICD-10-CM

## 2023-07-26 DIAGNOSIS — F41.8 DEPRESSION WITH ANXIETY: Chronic | ICD-10-CM

## 2023-07-26 DIAGNOSIS — M06.09 RHEUMATOID ARTHRITIS OF MULTIPLE SITES WITH NEGATIVE RHEUMATOID FACTOR: Chronic | ICD-10-CM

## 2023-07-26 DIAGNOSIS — E78.2 MIXED HYPERLIPIDEMIA: ICD-10-CM

## 2023-07-26 LAB
ALBUMIN SERPL-MCNC: 4.6 G/DL (ref 3.5–5.2)
ALBUMIN/GLOB SERPL: 3.1 G/DL
ALP SERPL-CCNC: 65 U/L (ref 39–117)
ALT SERPL-CCNC: 19 U/L (ref 1–33)
AST SERPL-CCNC: 22 U/L (ref 1–32)
BILIRUB SERPL-MCNC: 0.6 MG/DL (ref 0–1.2)
BUN SERPL-MCNC: 17 MG/DL (ref 8–23)
BUN/CREAT SERPL: 24.3 (ref 7–25)
CALCIUM SERPL-MCNC: 10.4 MG/DL (ref 8.6–10.5)
CHLORIDE SERPL-SCNC: 101 MMOL/L (ref 98–107)
CHOLEST SERPL-MCNC: 208 MG/DL (ref 0–200)
CO2 SERPL-SCNC: 26.8 MMOL/L (ref 22–29)
CREAT SERPL-MCNC: 0.7 MG/DL (ref 0.57–1)
EGFRCR SERPLBLD CKD-EPI 2021: 96.1 ML/MIN/1.73
ERYTHROCYTE [DISTWIDTH] IN BLOOD BY AUTOMATED COUNT: 13 % (ref 12.3–15.4)
GLOBULIN SER CALC-MCNC: 1.5 GM/DL
GLUCOSE SERPL-MCNC: 90 MG/DL (ref 65–99)
HCT VFR BLD AUTO: 40 % (ref 34–46.6)
HDLC SERPL-MCNC: 64 MG/DL (ref 40–60)
HGB BLD-MCNC: 13.7 G/DL (ref 12–15.9)
LDLC SERPL CALC-MCNC: 130 MG/DL (ref 0–100)
LDLC/HDLC SERPL: 2 {RATIO}
MCH RBC QN AUTO: 30.6 PG (ref 26.6–33)
MCHC RBC AUTO-ENTMCNC: 34.3 G/DL (ref 31.5–35.7)
MCV RBC AUTO: 89.5 FL (ref 79–97)
PLATELET # BLD AUTO: 321 10*3/MM3 (ref 140–450)
POTASSIUM SERPL-SCNC: 4.8 MMOL/L (ref 3.5–5.2)
PROT SERPL-MCNC: 6.1 G/DL (ref 6–8.5)
RBC # BLD AUTO: 4.47 10*6/MM3 (ref 3.77–5.28)
SODIUM SERPL-SCNC: 140 MMOL/L (ref 136–145)
T4 FREE SERPL-MCNC: 1.9 NG/DL (ref 0.93–1.7)
TRIGL SERPL-MCNC: 80 MG/DL (ref 0–150)
TSH SERPL DL<=0.005 MIU/L-ACNC: 1.22 UIU/ML (ref 0.27–4.2)
VLDLC SERPL CALC-MCNC: 14 MG/DL (ref 5–40)
WBC # BLD AUTO: 4.88 10*3/MM3 (ref 3.4–10.8)

## 2023-07-26 NOTE — ASSESSMENT & PLAN NOTE
Lipid abnormalities are improving with lifestyle modifications.  Nutritional counseling was provided.  Lipids will be reassessed in 6 months.    Lab Results   Component Value Date    CHLPL 217 (H) 06/09/2017    TRIG 92 06/09/2017    HDL 67 06/09/2017     (H) 06/09/2017

## 2023-07-26 NOTE — ASSESSMENT & PLAN NOTE
Patient's depression is recurrent and is mild without psychosis. Their depression is currently in full remission and the condition is improving with treatment. This will be reassessed at the next regular appointment. F/U as described:patient will continue current medication therapy.    She plans to wean off by winter.

## 2023-07-26 NOTE — PROGRESS NOTES
The ABCs of the Annual Wellness Visit  Welcome to Medicare Visit    Subjective     Stacey Meyer is a 67 y.o. female who presents for a  Welcome to Medicare Visit.    The following portions of the patient's history were reviewed and   updated as appropriate: allergies, current medications, past family history, past medical history, past social history, past surgical history, and problem list.     She has  chronic medical conditions including: Hypertension, hypothyroid, rheumatoid arthritis, depression     Depression: much better on lexapro    had surgery   Son in law: heart transplant    Colon: next 6/2024  Dexa at Rheum: states last was improved.   Breast cancer screen: UTD 6/26/2023    10-20K steps a day    Will see ortho tomorrow for foot pain.     Alcohol: few glasses of wine but no longer drinking now.     Lab Results   Component Value Date    CHLPL 217 (H) 06/09/2017    TRIG 92 06/09/2017    HDL 67 06/09/2017     (H) 06/09/2017        Compared to one year ago, the patient feels her physical   health is the same.    Compared to one year ago, the patient feels her mental   health is better.    Recent Hospitalizations:  She was not admitted to the hospital during the last year.       Current Medical Providers:  Patient Care Team:  Kayli Tucker III, NP-C as PCP - General (Family Medicine)  Danny Costa MD as Consulting Physician (Gastroenterology)  Katie Howell MD as Consulting Physician (Obstetrics and Gynecology)  Alessandro Galeano MD as Consulting Physician (Rheumatology)    Outpatient Medications Prior to Visit   Medication Sig Dispense Refill    escitalopram (Lexapro) 10 MG tablet Take 1 tablet by mouth Daily. 90 tablet 1    hydroxychloroquine (PLAQUENIL) 200 MG tablet Take 1 tablet by mouth Daily. PT HAS APPT WITH MD TOMORROW AND WILL ASK IF NEEDS TO HOLD FOR SURGERY      levothyroxine (SYNTHROID, LEVOTHROID) 112 MCG tablet TAKE 1 TABLET BY MOUTH EVERY DAY 90 tablet 1  "   moxifloxacin (VIGAMOX) 0.5 % ophthalmic solution 1 drop in each eye 3 times a day for 7 days 3 mL 0     No facility-administered medications prior to visit.       No opioid medication identified on active medication list. I have reviewed chart for other potential  high risk medication/s and harmful drug interactions in the elderly.        Aspirin is not on active medication list.  Aspirin use is not indicated based on review of current medical condition/s. Risk of harm outweighs potential benefits.  .    Patient Active Problem List   Diagnosis    Degeneration of lumbar intervertebral disc    Degeneration, intervertebral disc, thoracic    Fibromyalgia    Transient insomnia    Primary osteoarthritis of right hip    Familial ligamentous laxity    Hypertension    OA (osteoarthritis) of hip    Acquired hypothyroidism    Rheumatoid arthritis of multiple sites with negative rheumatoid factor    Gigantomastia    Erosive osteoarthritis    Depression with anxiety    Mixed hyperlipidemia     Advance Care Planning   Advance Care Planning     Advance Directive is on file.  ACP discussion was held with the patient during this visit. Patient has an advance directive in EMR which is still valid.        Objective   Vitals:    07/26/23 0927   BP: 124/70   BP Location: Left arm   Patient Position: Sitting   Cuff Size: Adult   Pulse: 67   SpO2: 100%   Weight: 48.8 kg (107 lb 9.6 oz)   Height: 152.4 cm (60\")   PainSc: 0-No pain     Estimated body mass index is 21.01 kg/m² as calculated from the following:    Height as of this encounter: 152.4 cm (60\").    Weight as of this encounter: 48.8 kg (107 lb 9.6 oz).    BMI is within normal parameters. No other follow-up for BMI required.      Does the patient have evidence of cognitive impairment?   No           ECG 12 Lead    Date/Time: 7/26/2023 10:43 AM  Performed by: Kayli Tucker III, NP-C  Authorized by: Kayli Tucker III, NP-C   Comparison: compared with " previous ECG from 2/10/2020  Similar to previous ECG  Rhythm: sinus rhythm  Rate: normal  BPM: 58    Clinical impression: normal ECG           HEALTH RISK ASSESSMENT    Smoking Status:  Social History     Tobacco Use   Smoking Status Former    Packs/day: 1.00    Years: 10.00    Pack years: 10.00    Types: Cigarettes    Quit date: 1986    Years since quittin.5   Smokeless Tobacco Never     Alcohol Consumption:  Social History     Substance and Sexual Activity   Alcohol Use Not Currently    Comment: Quit drinking 2019        Fall Risk Screen:    AUNDREA Fall Risk Assessment was completed, and patient is at LOW risk for falls.Assessment completed on:2023    Depression Screen:       2023     9:28 AM   PHQ-2/PHQ-9 Depression Screening   Little Interest or Pleasure in Doing Things 0-->not at all   Feeling Down, Depressed or Hopeless 0-->not at all   PHQ-9: Brief Depression Severity Measure Score 0       Health Habits and Functional and Cognitive Screenin/26/2023     9:28 AM   Functional & Cognitive Status   Do you have difficulty preparing food and eating? No   Do you have difficulty bathing yourself, getting dressed or grooming yourself? No   Do you have difficulty using the toilet? No   Do you have difficulty moving around from place to place? No   Do you have trouble with steps or getting out of a bed or a chair? No   Current Diet Well Balanced Diet   Dental Exam Up to date   Eye Exam Up to date   Exercise (times per week) 3 times per week   Current Exercises Include Walking   Do you need help using the phone?  No   Are you deaf or do you have serious difficulty hearing?  No   Do you need help to go to places out of walking distance? No   Do you need help shopping? No   Do you need help preparing meals?  No   Do you need help with housework?  No   Do you need help with laundry? No   Do you need help taking your medications? No   Do you need help managing money? No   Do you ever drive or  ride in a car without wearing a seat belt? No   Have you felt unusual stress, anger or loneliness in the last month? No   Who do you live with? Spouse   If you need help, do you have trouble finding someone available to you? No   Have you been bothered in the last four weeks by sexual problems? No   Do you have difficulty concentrating, remembering or making decisions? Yes       Visual Acuity:    Vision Screening    Right eye Left eye Both eyes   Without correction 20/20 20/25 20/15   With correction          Age-appropriate Screening Schedule:  Refer to the list below for future screening recommendations based on patient's age, sex and/or medical conditions. Orders for these recommended tests are listed in the plan section. The patient has been provided with a written plan.    Health Maintenance   Topic Date Due    DXA SCAN  Never done    ZOSTER VACCINE (1 of 2) Never done    ANNUAL WELLNESS VISIT  Never done    PAP SMEAR  06/08/2023    LIPID PANEL  07/26/2023    INFLUENZA VACCINE  10/01/2023    COLORECTAL CANCER SCREENING  06/26/2024    MAMMOGRAM  06/26/2025    TDAP/TD VACCINES (2 - Td or Tdap) 05/01/2027    HEPATITIS C SCREENING  Completed    COVID-19 Vaccine  Completed    Pneumococcal Vaccine 65+  Completed        CMS Preventative Services Quick Reference  Risk Factors Identified During Encounter    Immunizations Discussed/Encouraged: Shingrix and RSV    Get annual eye exams due to plaquenil     Physical Exam  Vitals reviewed.   Constitutional:       Appearance: Normal appearance. She is well-developed.   Neck:      Thyroid: No thyromegaly.   Cardiovascular:      Rate and Rhythm: Normal rate and regular rhythm.      Pulses: Normal pulses.      Heart sounds: Normal heart sounds.   Pulmonary:      Effort: Pulmonary effort is normal.      Breath sounds: Normal breath sounds.      Comments: E/U   Abdominal:      General: Bowel sounds are normal.      Palpations: Abdomen is soft.   Musculoskeletal:      Right lower  leg: No edema.      Left lower leg: No edema.   Lymphadenopathy:      Cervical: No cervical adenopathy.   Skin:     General: Skin is warm and dry.      Capillary Refill: Capillary refill takes 2 to 3 seconds.   Neurological:      Mental Status: She is alert and oriented to person, place, and time.   Psychiatric:         Mood and Affect: Mood normal.         Behavior: Behavior normal. Behavior is cooperative.         Thought Content: Thought content normal.         Judgment: Judgment normal.          The above risks/problems have been discussed with the patient.  Pertinent information has been shared with the patient in the After Visit Summary.  Follow up plans and orders are seen below in the Assessment/Plan Section.    Problem List Items Addressed This Visit          Cardiac and Vasculature    Mixed hyperlipidemia    Current Assessment & Plan     Lipid abnormalities are improving with lifestyle modifications.  Nutritional counseling was provided.  Lipids will be reassessed in 6 months.    Lab Results   Component Value Date    CHLPL 217 (H) 06/09/2017    TRIG 92 06/09/2017    HDL 67 06/09/2017     (H) 06/09/2017             Relevant Orders    Comprehensive Metabolic Panel    Lipid Panel With LDL / HDL Ratio    CBC (No Diff)       Endocrine and Metabolic    Acquired hypothyroidism (Chronic)    Current Assessment & Plan     Stable   Check lab for ongoing therapeutic drug monitoring           Relevant Orders    TSH    T4, free       Mental Health    Depression with anxiety (Chronic)    Current Assessment & Plan     Patient's depression is recurrent and is mild without psychosis. Their depression is currently in full remission and the condition is improving with treatment. This will be reassessed at the next regular appointment. F/U as described:patient will continue current medication therapy.    She plans to wean off by winter.             Musculoskeletal and Injuries    Rheumatoid arthritis of multiple sites  with negative rheumatoid factor (Chronic)    Overview     Seronegative RA - has been seen by Dr Galeano  Tried Humira 2020: no improvement, discontinued     Current treatment: plaquenil            Current Assessment & Plan     Continues plaquenil           Other Visit Diagnoses       Welcome to Medicare preventive visit    -  Primary    Relevant Orders    ECG 12 Lead             Follow Up:   Initial Medicare Visit in one year    An After Visit Summary and PPPS were made available to the patient.

## 2023-07-26 NOTE — PATIENT INSTRUCTIONS
Vaccines:     Shingles vaccine is given in TWO separate injections.   CDC recommends that healthy adults 50 years and older get two doses of the shingles vaccine called Shingrix (recombinant zoster vaccine),  by 2 to 6 months, to prevent shingles and the complications from the disease.        Medicare Wellness  Personal Prevention Plan of Service     Date of Office Visit:    Encounter Provider:  Kayli Tucker III, NP-C  Place of Service:  [facility]  Patient Name: Stacey Meyer  :  1955    As part of the Medicare Wellness portion of your visit today, we are providing you with this personalized preventive plan of services (PPPS). This plan is based upon recommendations of the [place] Preventive Services Task Force (USPSTF) and the Advisory Committee on Immunization Practices (ACIP).    This lists the preventive care services that should be considered, and provides dates of when you are due. Items listed as completed are up-to-date and do not require any further intervention.    Health Maintenance   Topic Date Due    DXA SCAN  Never done    ZOSTER VACCINE (1 of 2) Never done    ANNUAL WELLNESS VISIT  Never done    PAP SMEAR  2023    LIPID PANEL  2023    INFLUENZA VACCINE  10/01/2023    COLORECTAL CANCER SCREENING  2024    MAMMOGRAM  2025    TDAP/TD VACCINES (2 - Td or Tdap) 2027    HEPATITIS C SCREENING  Completed    COVID-19 Vaccine  Completed    Pneumococcal Vaccine 65+  Completed       DEXA completed at outside office.     Orders Placed This Encounter   Procedures    Comprehensive Metabolic Panel     Order Specific Question:   Release to patient     Answer:   Routine Release    Lipid Panel With LDL / HDL Ratio     Order Specific Question:   Release to patient     Answer:   Routine Release    CBC (No Diff)     Order Specific Question:   Release to patient     Answer:   Routine Release    TSH     Order Specific  Question:   Release to patient     Answer:   Routine Release    T4, free     Order Specific Question:   Release to patient     Answer:   Routine Release       Return in about 6 months (around 1/26/2024).

## 2023-07-27 ENCOUNTER — TELEPHONE (OUTPATIENT)
Dept: ORTHOPEDIC SURGERY | Facility: CLINIC | Age: 68
End: 2023-07-27

## 2023-07-27 ENCOUNTER — OFFICE VISIT (OUTPATIENT)
Dept: ORTHOPEDIC SURGERY | Facility: CLINIC | Age: 68
End: 2023-07-27
Payer: MEDICARE

## 2023-07-27 VITALS — TEMPERATURE: 97.7 F | BODY MASS INDEX: 21.28 KG/M2 | HEIGHT: 60 IN | WEIGHT: 108.4 LBS

## 2023-07-27 DIAGNOSIS — S92.354S CLOSED NONDISPLACED FRACTURE OF FIFTH METATARSAL BONE OF RIGHT FOOT, SEQUELA: ICD-10-CM

## 2023-07-27 DIAGNOSIS — M19.079 ARTHRITIS OF FOOT: ICD-10-CM

## 2023-07-27 DIAGNOSIS — M19.071 ARTHRITIS OF FIRST METATARSOPHALANGEAL (MTP) JOINT OF RIGHT FOOT: Primary | ICD-10-CM

## 2023-07-27 DIAGNOSIS — R52 PAIN: ICD-10-CM

## 2023-07-27 PROBLEM — S92.354A CLOSED NONDISPLACED FRACTURE OF FIFTH RIGHT METATARSAL BONE: Status: ACTIVE | Noted: 2023-07-27

## 2023-07-27 NOTE — PROGRESS NOTES
New Patient Complaint      Patient: Stacey Meyer  YOB: 1955 67 y.o. female  Medical Record Number: [id number]    Chief Complaints: Big toe hurts    History of Present Illness:   Patient has a history of rheumatoid arthritis and reports a history of chronic pain in her right first MTP joint that shoots into her toe.  She been followed by Dr. Harvey and had an injection done in the spring which gave her some initial relief with recurrence of symptoms.  She reports that she had some type of previous hammertoe procedures on the fourth and fifth also think she had an old fifth metatarsal fracture.  She had left bunion corrected many years ago.    HPI    Allergies:   Allergies   Allergen Reactions    Erythromycin Rash    Metronidazole Rash and Swelling    Oxycodone-Aspirin Nausea And Vomiting and Nausea Only    Sulfa Antibiotics Rash    Tetracycline Rash    Tetracyclines & Related Rash       Medications:   Current Outpatient Medications on File Prior to Visit   Medication Sig    escitalopram (Lexapro) 10 MG tablet Take 1 tablet by mouth Daily.    hydroxychloroquine (PLAQUENIL) 200 MG tablet Take 1 tablet by mouth Daily. PT HAS APPT WITH MD TOMORROW AND WILL ASK IF NEEDS TO HOLD FOR SURGERY    levothyroxine (SYNTHROID, LEVOTHROID) 112 MCG tablet TAKE 1 TABLET BY MOUTH EVERY DAY     No current facility-administered medications on file prior to visit.       Past Medical History:   Diagnosis Date    Allergic ?    percadan sulfa emycin tetriccyclin flagel    Anxiety     Degeneration, intervertebral disc, thoracic     Depression     Depression 06/04/2016    Erosive osteoarthritis     Dr Galeano    History of peptic ulcer     History of transfusion     AFTER HIP REPLACEMENT     Hypertension     Hypothyroidism many years    Knee swelling 09/2009    OA (osteoarthritis) of hip     Osteoarthritis     PONV (postoperative nausea and vomiting)     Primary osteoarthritis of right hip     Thyroid disease      "HYPOTHYROID    Visual impairment readers     Past Surgical History:   Procedure Laterality Date    BILATERAL BREAST REDUCTION Bilateral 2018    Procedure: BREAST REDUCTION BILATERAL, NEW IMAGE;  Surgeon: Myranda Perea MD;  Location: [facility] OR;  Service: New Image    BREAST SURGERY  2018    BUNIONECTOMY Left     COLONOSCOPY      2009    COLONOSCOPY N/A 2019    Procedure: COLONOSCOPY WITH COLD POLYPECTOMY x 2;  Surgeon: Danny Costa MD;  Location: [facility] ENDOSCOPY;  Service: Gastroenterology    ECTOPIC PREGNANCY SURGERY Bilateral     2 SEPARATE SURGERIES    ENDOSCOPY N/A 2016    Procedure: ESOPHAGOGASTRODUODENOSCOPY WITH COLD BIOPSIES;  Surgeon: Danny Costa MD;  Location: [facility] ENDOSCOPY;  Service:     ENDOSCOPY N/A 2016    Procedure: ESOPHAGOGASTRODUODENOSCOPY;  Surgeon: Danny Costa MD;  Location: [facility] ENDOSCOPY;  Service:     FERTILITY SURGERY      FOOT SURGERY Bilateral     \"THEY BROKE MY 5TH TOE ON BOTH SIDES AND STRIGHTENED THEM\"     HAMMER TOE REPAIR Bilateral     HAND SURGERY  2021    left hand     JOINT REPLACEMENT Right 07/15/2020    hand    TONSILLECTOMY      TOTAL HIP ARTHROPLASTY Right 2016    Procedure: RIGHT TOTAL HIP ARTHROPLASTY ANTERIOR APPROACH;  Surgeon: Eric Clayton MD;  Location: [facility] MAIN OR;  Service:      Social History     Occupational History    Occupation: teacher   Tobacco Use    Smoking status: Former     Packs/day: 1.00     Years: 10.00     Pack years: 10.00     Types: Cigarettes     Quit date: 1986     Years since quittin.5    Smokeless tobacco: Never   Vaping Use    Vaping Use: Never used   Substance and Sexual Activity    Alcohol use: Not Currently     Comment: Quit drinking 2019     Drug use: No    Sexual activity: Not Currently     Partners: Male     Birth control/protection: None      Social History     Social History Narrative    Not on file     Family History   Problem Relation Age of Onset    " "Liver disease Mother          at 69 yoa     Autoimmune disease Mother     Stroke Maternal Aunt     Obesity Brother     Malfercho Hyperthermia Neg Hx        Review of Systems: 14 point review of systems performed, positive pertinent findings identified in HPI. All remaining systems negative     Review of Systems      Physical Exam:   Vitals:    23 0830   Temp: 97.7 °F (36.5 °C)   Weight: 49.2 kg (108 lb 6.4 oz)   Height: 152.4 cm (60\")   PainSc:   4     Physical Exam   Constitutional: pleasant, well developed   Eyes: sclera non icteric  Hearing : adequate for exam  Cardiovascular: palpable pulses in right foot, right calf/ thigh NT without sign of DVT  Respiratoy: breathing unlabored   Neurological: grossly sensate to LT throughout right LE  Psychiatric: oriented with normal mood and affect.   Lymphatic: No palpable popliteal lymphadenopathy right LE  Skin: intact throughout right leg/foot  Musculoskeletal: Right foot shows mild to moderate arthritic change in the first MTP joint with palpable osteophytes.  She had 30 degrees dorsiflexion 20 great plantarflexion with mild pain on axial grind testing.  She had intercourse with flexion of the heel cord.  Her right fifth ray is short relative to the rest of her foot but there is no ulceration or callus.  Physical Exam  Ortho Exam    Radiology: 3 views right foot ordered evaluate pain reviewed and no prior x-rays available for comparison metatarsal fracture.  There is prominent arthritis of the first metatarsophalangeal joint with spurring medial laterally and dorsally.  There is arthritis that status post right naviculocuneiform and second tarsometatarsal joint.  There is an old fifth metatarsal fracture osteotomy with shortening of the fifth metatarsal.    Assessment/Plan: 1.  Right first MTP arthritis with hallux rigidus  2.  Previous right fifth metatarsal fracture or possible osteotomy and fourth and fifth hammertoe procedures done elsewhere with asymptomatic " shortening of the fifth ray    We discussed treatment going forward main thing bothering her is her first MTP joint    We discussed hierarchy of treatment and that she may ultimately require fusion however she does not wish to proceed with operative treatment at this time given her symptoms as well as her  just had a knee replacement done by Dr. Clayton about a week ago and her son-in-law is awaiting cardiac transplant    We discussed nonoperative treatment options    Prescribed compounding cream for her to apply the area several times daily    We also discussed injection which she was agreeable to and we will send her for radiographic guided injection of the right first MTP joint    I recommend heel cord stretching exercises to do at least 4 times a day.  Instruction sheet was provided and demonstrated for the patient. We discussed etiology of heel cord tightness to midfoot and forefoot overload.      Also seen by getting her fitted with a carbon fiber inserts and discussed etiology of the use.    We will see her back in 3 months with x-rays of her right foot.

## 2023-07-27 NOTE — TELEPHONE ENCOUNTER
Provider: VERENA  Caller: ZAN  Phone Number: [phone]  Reason for Call: PT IS CALLING TO TRY TO CONNECT ABOUT GETTING HER INSOLES FOR HER SHOES. PLEASE CALL HER BACK ASAP .

## 2023-07-31 ENCOUNTER — TELEPHONE (OUTPATIENT)
Dept: ORTHOPEDIC SURGERY | Facility: CLINIC | Age: 68
End: 2023-07-31
Payer: MEDICARE

## 2023-07-31 ENCOUNTER — TELEPHONE (OUTPATIENT)
Dept: ORTHOPEDIC SURGERY | Facility: CLINIC | Age: 68
End: 2023-07-31

## 2023-08-08 NOTE — TELEPHONE ENCOUNTER
Caller: Stacey Meyer    Relationship to patient: Self    Best call back number: [phone]    Chief complaint: RIGHT FOOT PAIN IN THE BIG TOE AREA    Type of visit: HOSPITAL PERFORMED INJECTION    Requested date: ASAP       Please note we have left several messages for patient and family members to call back directly.   There is no portal on file either

## 2023-08-16 ENCOUNTER — APPOINTMENT (OUTPATIENT)
Dept: CT IMAGING | Facility: HOSPITAL | Age: 68
End: 2023-08-16
Payer: MEDICARE

## 2023-08-16 ENCOUNTER — APPOINTMENT (OUTPATIENT)
Dept: MRI IMAGING | Facility: HOSPITAL | Age: 68
End: 2023-08-16
Payer: MEDICARE

## 2023-08-16 ENCOUNTER — HOSPITAL ENCOUNTER (OUTPATIENT)
Facility: HOSPITAL | Age: 68
Discharge: HOME | End: 2023-08-16
Attending: EMERGENCY MEDICINE | Admitting: EMERGENCY MEDICINE
Payer: MEDICARE

## 2023-08-16 ENCOUNTER — READMISSION MANAGEMENT (OUTPATIENT)
Dept: CALL CENTER | Facility: HOSPITAL | Age: 68
End: 2023-08-16
Payer: MEDICARE

## 2023-08-16 ENCOUNTER — APPOINTMENT (OUTPATIENT)
Dept: GENERAL RADIOLOGY | Facility: HOSPITAL | Age: 68
End: 2023-08-16
Payer: MEDICARE

## 2023-08-16 VITALS
WEIGHT: 107 LBS | TEMPERATURE: 98.2 F | HEART RATE: 65 BPM | OXYGEN SATURATION: 99 % | HEIGHT: 60 IN | BODY MASS INDEX: 21.01 KG/M2 | DIASTOLIC BLOOD PRESSURE: 86 MMHG | SYSTOLIC BLOOD PRESSURE: 125 MMHG | RESPIRATION RATE: 16 BRPM

## 2023-08-16 DIAGNOSIS — H53.8 BLURRY VISION, LEFT EYE: Primary | ICD-10-CM

## 2023-08-16 DIAGNOSIS — I10 HYPERTENSION, UNSPECIFIED TYPE: ICD-10-CM

## 2023-08-16 DIAGNOSIS — Z87.39 HISTORY OF RHEUMATOID ARTHRITIS: ICD-10-CM

## 2023-08-16 LAB
ALBUMIN SERPL-MCNC: 4.9 G/DL (ref 3.5–5.2)
ALBUMIN/GLOB SERPL: 2.1 G/DL
ALP SERPL-CCNC: 70 U/L (ref 39–117)
ALT SERPL W P-5'-P-CCNC: 22 U/L (ref 1–33)
ANION GAP SERPL CALCULATED.3IONS-SCNC: 15 MMOL/L (ref 5–15)
AST SERPL-CCNC: 31 U/L (ref 1–32)
BASOPHILS # BLD AUTO: 0.02 10*3/MM3 (ref 0–0.2)
BASOPHILS NFR BLD AUTO: 0.4 % (ref 0–1.5)
BILIRUB SERPL-MCNC: 1.4 MG/DL (ref 0–1.2)
BUN SERPL-MCNC: 15 MG/DL (ref 8–23)
BUN/CREAT SERPL: 23.1 (ref 7–25)
CALCIUM SPEC-SCNC: 10.4 MG/DL (ref 8.6–10.5)
CHLORIDE SERPL-SCNC: 98 MMOL/L (ref 98–107)
CHOLEST SERPL-MCNC: 254 MG/DL (ref 0–200)
CO2 SERPL-SCNC: 27 MMOL/L (ref 22–29)
CREAT SERPL-MCNC: 0.65 MG/DL (ref 0.57–1)
CRP SERPL-MCNC: <0.3 MG/DL (ref 0–0.5)
DEPRECATED RDW RBC AUTO: 44.5 FL (ref 37–54)
EGFRCR SERPLBLD CKD-EPI 2021: 97.8 ML/MIN/1.73
EOSINOPHIL # BLD AUTO: 0.06 10*3/MM3 (ref 0–0.4)
EOSINOPHIL NFR BLD AUTO: 1.3 % (ref 0.3–6.2)
ERYTHROCYTE [DISTWIDTH] IN BLOOD BY AUTOMATED COUNT: 13.1 % (ref 12.3–15.4)
ERYTHROCYTE [SEDIMENTATION RATE] IN BLOOD: 7 MM/HR (ref 0–30)
GLOBULIN UR ELPH-MCNC: 2.3 GM/DL
GLUCOSE SERPL-MCNC: 101 MG/DL (ref 65–99)
HBA1C MFR BLD: 5.2 % (ref 4.8–5.6)
HCT VFR BLD AUTO: 43.7 % (ref 34–46.6)
HDLC SERPL-MCNC: 88 MG/DL (ref 40–60)
HGB BLD-MCNC: 14.5 G/DL (ref 12–15.9)
IMM GRANULOCYTES # BLD AUTO: 0.01 10*3/MM3 (ref 0–0.05)
IMM GRANULOCYTES NFR BLD AUTO: 0.2 % (ref 0–0.5)
INR PPP: 0.89 (ref 0.9–1.1)
LDLC SERPL CALC-MCNC: 152 MG/DL (ref 0–100)
LDLC/HDLC SERPL: 1.7 {RATIO}
LYMPHOCYTES # BLD AUTO: 1.28 10*3/MM3 (ref 0.7–3.1)
LYMPHOCYTES NFR BLD AUTO: 28.4 % (ref 19.6–45.3)
MCH RBC QN AUTO: 30.8 PG (ref 26.6–33)
MCHC RBC AUTO-ENTMCNC: 33.2 G/DL (ref 31.5–35.7)
MCV RBC AUTO: 92.8 FL (ref 79–97)
MONOCYTES # BLD AUTO: 0.47 10*3/MM3 (ref 0.1–0.9)
MONOCYTES NFR BLD AUTO: 10.4 % (ref 5–12)
NEUTROPHILS NFR BLD AUTO: 2.66 10*3/MM3 (ref 1.7–7)
NEUTROPHILS NFR BLD AUTO: 59.3 % (ref 42.7–76)
NRBC BLD AUTO-RTO: 0 /100 WBC (ref 0–0.2)
PLATELET # BLD AUTO: 397 10*3/MM3 (ref 140–450)
PMV BLD AUTO: 9.3 FL (ref 6–12)
POTASSIUM SERPL-SCNC: 4.4 MMOL/L (ref 3.5–5.2)
PROT SERPL-MCNC: 7.2 G/DL (ref 6–8.5)
PROTHROMBIN TIME: 12.2 SECONDS (ref 11.7–14.2)
RBC # BLD AUTO: 4.71 10*6/MM3 (ref 3.77–5.28)
SODIUM SERPL-SCNC: 140 MMOL/L (ref 136–145)
TRIGL SERPL-MCNC: 83 MG/DL (ref 0–150)
TROPONIN T SERPL HS-MCNC: 9 NG/L
VLDLC SERPL-MCNC: 14 MG/DL (ref 5–40)
WBC NRBC COR # BLD: 4.5 10*3/MM3 (ref 3.4–10.8)

## 2023-08-16 PROCEDURE — 86140 C-REACTIVE PROTEIN: CPT | Performed by: NURSE PRACTITIONER

## 2023-08-16 PROCEDURE — G0378 HOSPITAL OBSERVATION PER HR: HCPCS

## 2023-08-16 PROCEDURE — 25510000001 IOPAMIDOL PER 1 ML: Performed by: EMERGENCY MEDICINE

## 2023-08-16 PROCEDURE — 83036 HEMOGLOBIN GLYCOSYLATED A1C: CPT | Performed by: PHYSICIAN ASSISTANT

## 2023-08-16 PROCEDURE — 85652 RBC SED RATE AUTOMATED: CPT | Performed by: NURSE PRACTITIONER

## 2023-08-16 PROCEDURE — 70498 CT ANGIOGRAPHY NECK: CPT

## 2023-08-16 PROCEDURE — 99285 EMERGENCY DEPT VISIT HI MDM: CPT

## 2023-08-16 PROCEDURE — 85025 COMPLETE CBC W/AUTO DIFF WBC: CPT | Performed by: NURSE PRACTITIONER

## 2023-08-16 PROCEDURE — 70481 CT ORBIT/EAR/FOSSA W/DYE: CPT

## 2023-08-16 PROCEDURE — 70496 CT ANGIOGRAPHY HEAD: CPT

## 2023-08-16 PROCEDURE — 84484 ASSAY OF TROPONIN QUANT: CPT | Performed by: NURSE PRACTITIONER

## 2023-08-16 PROCEDURE — 80061 LIPID PANEL: CPT | Performed by: PHYSICIAN ASSISTANT

## 2023-08-16 PROCEDURE — 0 GADOBENATE DIMEGLUMINE 529 MG/ML SOLUTION: Performed by: EMERGENCY MEDICINE

## 2023-08-16 PROCEDURE — 71045 X-RAY EXAM CHEST 1 VIEW: CPT

## 2023-08-16 PROCEDURE — 82565 ASSAY OF CREATININE: CPT

## 2023-08-16 PROCEDURE — 70549 MR ANGIOGRAPH NECK W/O&W/DYE: CPT

## 2023-08-16 PROCEDURE — 70544 MR ANGIOGRAPHY HEAD W/O DYE: CPT

## 2023-08-16 PROCEDURE — 93010 ELECTROCARDIOGRAM REPORT: CPT | Performed by: INTERNAL MEDICINE

## 2023-08-16 PROCEDURE — A9577 INJ MULTIHANCE: HCPCS | Performed by: EMERGENCY MEDICINE

## 2023-08-16 PROCEDURE — 93005 ELECTROCARDIOGRAM TRACING: CPT | Performed by: NURSE PRACTITIONER

## 2023-08-16 PROCEDURE — 80053 COMPREHEN METABOLIC PANEL: CPT | Performed by: NURSE PRACTITIONER

## 2023-08-16 PROCEDURE — 70543 MRI ORBT/FAC/NCK W/O &W/DYE: CPT

## 2023-08-16 PROCEDURE — 85610 PROTHROMBIN TIME: CPT | Performed by: NURSE PRACTITIONER

## 2023-08-16 RX ORDER — LISINOPRIL 10 MG/1
10 TABLET ORAL
Status: DISCONTINUED | OUTPATIENT
Start: 2023-08-16 | End: 2023-08-16

## 2023-08-16 RX ORDER — ONDANSETRON 4 MG/1
4 TABLET, FILM COATED ORAL EVERY 6 HOURS PRN
Status: DISCONTINUED | OUTPATIENT
Start: 2023-08-16 | End: 2023-08-16 | Stop reason: HOSPADM

## 2023-08-16 RX ORDER — HYDROXYCHLOROQUINE SULFATE 200 MG/1
400 TABLET, FILM COATED ORAL EVERY OTHER DAY
Status: DISCONTINUED | OUTPATIENT
Start: 2023-08-17 | End: 2023-08-16 | Stop reason: HOSPADM

## 2023-08-16 RX ORDER — HYDROXYCHLOROQUINE SULFATE 200 MG/1
200 TABLET, FILM COATED ORAL EVERY OTHER DAY
COMMUNITY

## 2023-08-16 RX ORDER — SODIUM CHLORIDE 0.9 % (FLUSH) 0.9 %
10 SYRINGE (ML) INJECTION EVERY 12 HOURS SCHEDULED
Status: DISCONTINUED | OUTPATIENT
Start: 2023-08-16 | End: 2023-08-16 | Stop reason: HOSPADM

## 2023-08-16 RX ORDER — ONDANSETRON 2 MG/ML
4 INJECTION INTRAMUSCULAR; INTRAVENOUS EVERY 6 HOURS PRN
Status: DISCONTINUED | OUTPATIENT
Start: 2023-08-16 | End: 2023-08-16 | Stop reason: HOSPADM

## 2023-08-16 RX ORDER — LEVOTHYROXINE SODIUM 112 UG/1
112 TABLET ORAL DAILY
Status: DISCONTINUED | OUTPATIENT
Start: 2023-08-16 | End: 2023-08-16 | Stop reason: HOSPADM

## 2023-08-16 RX ORDER — CHOLECALCIFEROL (VITAMIN D3) 125 MCG
5 CAPSULE ORAL NIGHTLY PRN
Status: DISCONTINUED | OUTPATIENT
Start: 2023-08-16 | End: 2023-08-16 | Stop reason: HOSPADM

## 2023-08-16 RX ORDER — SODIUM CHLORIDE 0.9 % (FLUSH) 0.9 %
10 SYRINGE (ML) INJECTION AS NEEDED
Status: DISCONTINUED | OUTPATIENT
Start: 2023-08-16 | End: 2023-08-16 | Stop reason: HOSPADM

## 2023-08-16 RX ORDER — NITROGLYCERIN 0.4 MG/1
0.4 TABLET SUBLINGUAL
Status: DISCONTINUED | OUTPATIENT
Start: 2023-08-16 | End: 2023-08-16 | Stop reason: HOSPADM

## 2023-08-16 RX ORDER — SODIUM CHLORIDE 9 MG/ML
40 INJECTION, SOLUTION INTRAVENOUS AS NEEDED
Status: DISCONTINUED | OUTPATIENT
Start: 2023-08-16 | End: 2023-08-16 | Stop reason: HOSPADM

## 2023-08-16 RX ORDER — ACETAMINOPHEN 325 MG/1
650 TABLET ORAL EVERY 4 HOURS PRN
Status: DISCONTINUED | OUTPATIENT
Start: 2023-08-16 | End: 2023-08-16 | Stop reason: HOSPADM

## 2023-08-16 RX ORDER — HYDROXYCHLOROQUINE SULFATE 200 MG/1
200 TABLET, FILM COATED ORAL EVERY OTHER DAY
Status: DISCONTINUED | OUTPATIENT
Start: 2023-08-16 | End: 2023-08-16 | Stop reason: HOSPADM

## 2023-08-16 RX ORDER — ASPIRIN 81 MG/1
324 TABLET, CHEWABLE ORAL ONCE
Status: COMPLETED | OUTPATIENT
Start: 2023-08-16 | End: 2023-08-16

## 2023-08-16 RX ORDER — ESCITALOPRAM OXALATE 10 MG/1
10 TABLET ORAL DAILY
Status: DISCONTINUED | OUTPATIENT
Start: 2023-08-16 | End: 2023-08-16 | Stop reason: HOSPADM

## 2023-08-16 RX ADMIN — GADOBENATE DIMEGLUMINE 9 ML: 529 INJECTION, SOLUTION INTRAVENOUS at 13:18

## 2023-08-16 RX ADMIN — Medication 10 ML: at 10:44

## 2023-08-16 RX ADMIN — IOPAMIDOL 85 ML: 755 INJECTION, SOLUTION INTRAVENOUS at 08:50

## 2023-08-16 RX ADMIN — ASPIRIN 324 MG: 81 TABLET, CHEWABLE ORAL at 09:13

## 2023-08-16 NOTE — H&P
[facility]   HISTORY AND PHYSICAL    Patient Name: Stacey Meyer  : 1955  MRN: [id number]  Primary Care Physician:  Kayli Tucker III, NP-C  Date of admission: 2023    Subjective   Subjective     Chief Complaint:   Chief Complaint   Patient presents with    Blurred Vision    Headache         HPI:    Stacey Meyer is a 67 y.o. female with history of rheumatoid arthritis, hypothyroidism, and anxiety and depression, who presented to the emergency department today complaining of intermittent blurry vision and headache since .  Patient states on  she developed headache and her vision was blurry.  She describes it as a film over her vision.  She states this has been occurring in her left eye, right eye not affected.  Patient states she has had a headache and what feels like pressure in her left eye but denies eye pain.  Patient states it happened again on Monday and again today and therefore she decided to present to the emergency department.  Of note, patient had an eye exam within the last month that was normal.  She is on Plaquenil for rheumatoid arthritis.  Patient was also recently started on Lexapro in July due to increased stress and anxiety from family issues.  Patient denies facial droop, slurred speech, unilateral weakness, chest pain, shortness of breath, palpitations, nausea, vomiting, diarrhea, fever, chills, recent sick contacts.  At time of my exam patient states her vision is at baseline.    ED evaluation reviewed, patient arrived with blood pressure 184/107.  Patient is not on any blood pressure medications, reports her blood pressure is usually normal, reports recent physical and blood pressure was 120s/80s.  CTA head and neck and CT of orbits are unremarkable.  ED provider spoke with neurology as well as ophthalmology.  Neurology feels this may be primary ophthalmology issue, ophthalmology will come see patient.  Discussed with patient this may be blood  "pressure issue as well.  Patient is being admitted to observation unit for MRI of orbits as well as neurology and ophthalmology consultation.    Review of Systems   All systems were reviewed and negative except for: What is discussed in the HPI    Personal History     Past Medical History:   Diagnosis Date    Allergic ?    percadan sulfa emycin tetriccyclin flagel    Anxiety     Closed nondisplaced fracture of fifth right metatarsal bone 7/27/2023    Degeneration, intervertebral disc, thoracic     Depression     Depression 06/04/2016    Erosive osteoarthritis     Dr Galeano    History of peptic ulcer     History of transfusion     AFTER HIP REPLACEMENT     Hypertension     Hypothyroidism many years    Knee swelling 09/2009    OA (osteoarthritis) of hip     Osteoarthritis     PONV (postoperative nausea and vomiting)     Primary osteoarthritis of right hip     Thyroid disease     HYPOTHYROID    Visual impairment readers       Past Surgical History:   Procedure Laterality Date    BILATERAL BREAST REDUCTION Bilateral 11/14/2018    Procedure: BREAST REDUCTION BILATERAL, NEW IMAGE;  Surgeon: Myranda Perea MD;  Location: [facility] OR;  Service: New Image    BREAST SURGERY  2018    BUNIONECTOMY Left     COLONOSCOPY      2009    COLONOSCOPY N/A 06/26/2019    Procedure: COLONOSCOPY WITH COLD POLYPECTOMY x 2;  Surgeon: Danny Costa MD;  Location: [facility] ENDOSCOPY;  Service: Gastroenterology    ECTOPIC PREGNANCY SURGERY Bilateral     2 SEPARATE SURGERIES    ENDOSCOPY N/A 05/24/2016    Procedure: ESOPHAGOGASTRODUODENOSCOPY WITH COLD BIOPSIES;  Surgeon: Danny Costa MD;  Location: [facility] ENDOSCOPY;  Service:     ENDOSCOPY N/A 11/01/2016    Procedure: ESOPHAGOGASTRODUODENOSCOPY;  Surgeon: Danny Costa MD;  Location: [facility] ENDOSCOPY;  Service:     FERTILITY SURGERY      FOOT SURGERY Bilateral     \"THEY BROKE MY 5TH TOE ON BOTH SIDES AND STRIGHTENED THEM\"     HAMMER TOE REPAIR Bilateral     HAND SURGERY  " 04/28/2021    left hand     JOINT REPLACEMENT Right 07/15/2020    hand    TONSILLECTOMY      TOTAL HIP ARTHROPLASTY Right 07/13/2016    Procedure: RIGHT TOTAL HIP ARTHROPLASTY ANTERIOR APPROACH;  Surgeon: Eric Clayton MD;  Location: [facility] OR;  Service:        Family History: family history includes Autoimmune disease in her mother; Liver disease in her mother; Obesity in her brother; Stroke in her maternal aunt. Otherwise pertinent FHx was reviewed and not pertinent to current issue.    Social History:  reports that she quit smoking about 37 years ago. Her smoking use included cigarettes. She has a 10.00 pack-year smoking history. She has never used smokeless tobacco. She reports that she does not currently use alcohol. She reports that she does not use drugs.    Home Medications:  (Ibuprofen 3 %, Gabapentin 10 %, Baclofen 2 %, lidocaine 4 %), escitalopram, hydroxychloroquine, and levothyroxine    Allergies:  Allergies   Allergen Reactions    Erythromycin Rash    Metronidazole Rash and Swelling    Oxycodone-Aspirin Nausea And Vomiting and Nausea Only    Sulfa Antibiotics Rash    Tetracycline Rash    Tetracyclines & Related Rash       Objective   Objective     Vitals:   Temp:  [97.2 øF (36.2 øC)] 97.2 øF (36.2 øC)  Heart Rate:  [62-82] 62  Resp:  [16-18] 16  BP: (149-189)/() 149/86  Physical Exam     GENERAL: 67 y.o. YO female a/o x 4, NAD  SKIN: Warm pink and dry   HEENT:  PERRL, EOM intact, conjunctivae normal, sclerae clear  NECK: supple, no JVD  LUNGS: Clear to auscultation bilaterally without wheezes, rales or rhonchi.  No accessory muscle use and no nasal flaring.  CARDIAC:  Regular rate and rhythm, S1-S2.  No murmurs, rubs or gallops.  No peripheral edema.  Equal pulses bilaterally.  ABDOMEN: Soft, nontender, nondistended.  No guarding or rebound tenderness.  Normal bowel sounds.  MUSCULOSKELETAL: Moves all extremities well.  No deformity.  NEURO: Cranial nerves II through XII grossly intact.  No  gross focal deficits.  Alert.  Normal speech and motor.  PSYCH: Normal mood and affect      Result Review    Result Review:  I have personally reviewed the results from the time of this admission to 8/16/2023 09:51 EDT and agree with these findings:  [x]  Laboratory list / accordion  []  Microbiology  [x]  Radiology  []  EKG/Telemetry   []  Cardiology/Vascular   []  Pathology  []  Old records  []  Other:  Most notable findings include:     CT Angiogram Neck    Result Date: 8/16/2023   Unremarkable CT angiographic images of the head and neck.  Mild-to-moderate changes of chronic small vessel ischemic phenomena are incidentally noted.   Please take note that this is a preliminary result until the receipt of the reconstructed images from [employer] at which time this report will be finalized.  TECHNIQUE: CT scan of the orbits was obtained with 2 mm axial and coronal soft tissue algorithm as well as 1 mm axial, coronal, and sagittal bone algorithm images following the administration of IV contrast.  FINDINGS:  The globes, extraocular muscles, optic nerves, retrobulbar fat, and lacrimal glands are all unremarkable in appearance. No abnormalities are appreciated within the visualized maxillofacial structures.  IMPRESSION:  Unremarkable CT scan of the orbits.   Radiation dose reduction techniques were utilized, including automated exposure control and exposure modulation based on body size.   This report was finalized on 8/16/2023 10:57 AM by Dr. Deon Wilson M.D.      XR Chest 1 View    Result Date: 8/16/2023  No evidence for acute pulmonary process. Follow-up as clinical indications persist.  This report was finalized on 8/16/2023 9:28 AM by Dr. Vijay Alarcon M.D.      CT Angiogram Head    Result Date: 8/16/2023   Unremarkable CT angiographic images of the head and neck.  Mild-to-moderate changes of chronic small vessel ischemic phenomena are incidentally noted.   Please take note that this is a preliminary result until  the receipt of the reconstructed images from [facility] at which time this report will be finalized.  TECHNIQUE: CT scan of the orbits was obtained with 2 mm axial and coronal soft tissue algorithm as well as 1 mm axial, coronal, and sagittal bone algorithm images following the administration of IV contrast.  FINDINGS:  The globes, extraocular muscles, optic nerves, retrobulbar fat, and lacrimal glands are all unremarkable in appearance. No abnormalities are appreciated within the visualized maxillofacial structures.  IMPRESSION:  Unremarkable CT scan of the orbits.   Radiation dose reduction techniques were utilized, including automated exposure control and exposure modulation based on body size.   This report was finalized on 8/16/2023 10:57 AM by Dr. Deon Wilson M.D.      CT Orbits With Contrast    Result Date: 8/16/2023   Unremarkable CT angiographic images of the head and neck.  Mild-to-moderate changes of chronic small vessel ischemic phenomena are incidentally noted.   Please take note that this is a preliminary result until the receipt of the reconstructed images from [facility] at which time this report will be finalized.  TECHNIQUE: CT scan of the orbits was obtained with 2 mm axial and coronal soft tissue algorithm as well as 1 mm axial, coronal, and sagittal bone algorithm images following the administration of IV contrast.  FINDINGS:  The globes, extraocular muscles, optic nerves, retrobulbar fat, and lacrimal glands are all unremarkable in appearance. No abnormalities are appreciated within the visualized maxillofacial structures.  IMPRESSION:  Unremarkable CT scan of the orbits.   Radiation dose reduction techniques were utilized, including automated exposure control and exposure modulation based on body size.   This report was finalized on 8/16/2023 10:57 AM by Dr. Deon Wilson M.D.           Assessment & Plan   Assessment / Plan     Brief Patient Summary:  Stacey Meyer is a 67 y.o. female who is being  admitted to observation unit for further evaluation of visual disturbance.    Active Hospital Problems:  Active Hospital Problems    Diagnosis     **Blurry vision      Plan:     Visual disturbance  Headache  -CTA head and neck, CT orbits with contrast are unremarkable  -Check MRI orbits  -Consult neurology  -Consult ophthalmology  -Check lipids, hemoglobin A1c  -Patient received full dose aspirin in ED    Elevated blood pressure  -184/107 in ED  -We will give lisinopril 10 mg p.o.    Rheumatoid arthritis  -Continue Plaquenil    Anxiety and depression  -Continue Lexapro    DVT prophylaxis:  Mechanical DVT prophylaxis orders are present.    CODE STATUS:    Level Of Support Discussed With: Patient  Code Status (Patient has no pulse and is not breathing): CPR (Attempt to Resuscitate)  Medical Interventions (Patient has pulse or is breathing): Full Support    Admission Status:  I believe this patient meets observation status.     75 minutes has been spent by [facility] providers in the care of this patient while under observation status    I wore an appropriate PPE during this patient encounter.  Hand hygeine performed before and after seeing the patient.    Electronically signed by ROXANN Abbasi, 08/16/23, 9:51 AM EDT.

## 2023-08-16 NOTE — ED NOTES
"Nursing report ED to floor  Stacey Meyer  67 y.o.  female    HPI :   Chief Complaint   Patient presents with    Blurred Vision    Headache       Admitting doctor:   Avery Taylor II, MD    Admitting diagnosis:   There were no encounter diagnoses.    Code status:   Current Code Status       Date Active Code Status Order ID Comments User Context       8/16/2023 0950 CPR (Attempt to Resuscitate) [id number]  Janiya Montero PA ED        Question Answer    Code Status (Patient has no pulse and is not breathing) CPR (Attempt to Resuscitate)    Medical Interventions (Patient has pulse or is breathing) Full Support    Level Of Support Discussed With Patient                    Allergies:   Erythromycin, Metronidazole, Oxycodone-aspirin, Sulfa antibiotics, Tetracycline, and Tetracyclines & related    Isolation:   No active isolations    Intake and Output  No intake or output data in the 24 hours ending 08/16/23 0955    Weight:       08/16/23  0804   Weight: 48.5 kg (107 lb)       Most recent vitals:   Vitals:    08/16/23 0804 08/16/23 0805 08/16/23 0808 08/16/23 0945   BP:  (!) 184/107 (!) 189/102 149/86   BP Location:    Right arm   Patient Position:    Sitting   Pulse:  68  62   Resp:    16   Temp:       TempSrc:       SpO2:  100%  98%   Weight: 48.5 kg (107 lb)      Height: 152.4 cm (60\")          Active LDAs/IV Access:   Lines, Drains & Airways       Active LDAs       Name Placement date Placement time Site Days    Peripheral IV 08/16/23 0815 Left Antecubital 08/16/23  0815  Antecubital  less than 1    Closed/Suction Drain Right Breast Bulb 19 Fr. 11/14/18  --  Breast  1736    Closed/Suction Drain Left Breast Bulb 19 Fr. 11/14/18  --  Breast  1736                    Labs (abnormal labs have a star):   Labs Reviewed   PROTIME-INR - Abnormal; Notable for the following components:       Result Value    INR 0.89 (*)     All other components within normal limits   COMPREHENSIVE METABOLIC PANEL - Abnormal; Notable for " the following components:    Glucose 101 (*)     Total Bilirubin 1.4 (*)     All other components within normal limits    Narrative:     GFR Normal >60  Chronic Kidney Disease <60  Kidney Failure <15     CBC WITH AUTO DIFFERENTIAL - Normal   SINGLE HSTROPONIN T - Normal    Narrative:     High Sensitive Troponin T Reference Range:  <10.0 ng/L- Negative Female for AMI  <15.0 ng/L- Negative Male for AMI  >=10 - Abnormal Female indicating possible myocardial injury.  >=15 - Abnormal Male indicating possible myocardial injury.   Clinicians would have to utilize clinical acumen, EKG, Troponin, and serial changes to determine if it is an Acute Myocardial Infarction or myocardial injury due to an underlying chronic condition.        SEDIMENTATION RATE - Normal   C-REACTIVE PROTEIN - Normal   LIPID PANEL   HEMOGLOBIN A1C   CBC AND DIFFERENTIAL    Narrative:     The following orders were created for panel order CBC & Differential.  Procedure                               Abnormality         Status                     ---------                               -----------         ------                     CBC Auto Differential[[id number]]        Normal              Final result                 Please view results for these tests on the individual orders.       EKG:   ECG 12 Lead Rhythm Change   Preliminary Result   HEART RATE= 65  bpm   RR Interval= 923  ms   AK Interval= 166  ms   P Horizontal Axis= -10  deg   P Front Axis= 75  deg   QRSD Interval= 90  ms   QT Interval= 426  ms   QRS Axis= 35  deg   T Wave Axis= 35  deg   - NORMAL ECG -   Sinus rhythm   Electronically Signed By:    Date and Time of Study: 2023-08-16 09:10:14          Meds given in ED:   Medications   sodium chloride 0.9 % flush 10 mL (has no administration in time range)   sodium chloride 0.9 % flush 10 mL (has no administration in time range)   sodium chloride 0.9 % flush 10 mL (has no administration in time range)   sodium chloride 0.9 % infusion 40 mL (has no  administration in time range)   ondansetron (ZOFRAN) tablet 4 mg (has no administration in time range)     Or   ondansetron (ZOFRAN) injection 4 mg (has no administration in time range)   nitroglycerin (NITROSTAT) SL tablet 0.4 mg (has no administration in time range)   acetaminophen (TYLENOL) tablet 650 mg (has no administration in time range)   melatonin tablet 5 mg (has no administration in time range)   aspirin chewable tablet 324 mg (324 mg Oral Given 8/16/23 0993)   iopamidol (ISOVUE-370) 76 % injection 100 mL (85 mL Intravenous Given by Other 8/16/23 9953)       Imaging results:  CT Angiogram Neck    Result Date: 8/16/2023  Unremarkable CT angiographic images of the head and neck.  Mild-to-moderate changes of chronic small vessel ischemic phenomena are incidentally noted.  Please take note that this is a preliminary result until the receipt of the reconstructed images from [employer] at which time this report will be finalized.  TECHNIQUE: CT scan of the orbits was obtained with 2 mm axial and coronal soft tissue algorithm as well as 1 mm axial, coronal, and sagittal bone algorithm images following the administration of IV contrast.  FINDINGS:  The globes, extraocular muscles, optic nerves, retrobulbar fat, and lacrimal glands are all unremarkable in appearance. No abnormalities are appreciated within the visualized maxillofacial structures.  IMPRESSION:  Unremarkable CT scan of the orbits.  Radiation dose reduction techniques were utilized, including automated exposure control and exposure modulation based on body size.       XR Chest 1 View    Result Date: 8/16/2023  No evidence for acute pulmonary process. Follow-up as clinical indications persist.  This report was finalized on 8/16/2023 9:28 AM by Dr. Vijay Alarcon M.D.      CT Angiogram Head    Result Date: 8/16/2023  Unremarkable CT angiographic images of the head and neck.  Mild-to-moderate changes of chronic small vessel ischemic phenomena are  incidentally noted.  Please take note that this is a preliminary result until the receipt of the reconstructed images from [facility] at which time this report will be finalized.  TECHNIQUE: CT scan of the orbits was obtained with 2 mm axial and coronal soft tissue algorithm as well as 1 mm axial, coronal, and sagittal bone algorithm images following the administration of IV contrast.  FINDINGS:  The globes, extraocular muscles, optic nerves, retrobulbar fat, and lacrimal glands are all unremarkable in appearance. No abnormalities are appreciated within the visualized maxillofacial structures.  IMPRESSION:  Unremarkable CT scan of the orbits.  Radiation dose reduction techniques were utilized, including automated exposure control and exposure modulation based on body size.       CT Orbits With Contrast    Result Date: 8/16/2023  Unremarkable CT angiographic images of the head and neck.  Mild-to-moderate changes of chronic small vessel ischemic phenomena are incidentally noted.  Please take note that this is a preliminary result until the receipt of the reconstructed images from [facility] at which time this report will be finalized.  TECHNIQUE: CT scan of the orbits was obtained with 2 mm axial and coronal soft tissue algorithm as well as 1 mm axial, coronal, and sagittal bone algorithm images following the administration of IV contrast.  FINDINGS:  The globes, extraocular muscles, optic nerves, retrobulbar fat, and lacrimal glands are all unremarkable in appearance. No abnormalities are appreciated within the visualized maxillofacial structures.  IMPRESSION:  Unremarkable CT scan of the orbits.  Radiation dose reduction techniques were utilized, including automated exposure control and exposure modulation based on body size.        Ambulatory status:   - ambulatory    Social issues:   Social History     Socioeconomic History    Marital status:     Number of children: 2    Years of education: bachelor's degree    Tobacco Use    Smoking status: Former     Packs/day: 1.00     Years: 10.00     Pack years: 10.00     Types: Cigarettes     Quit date: 1986     Years since quittin.6    Smokeless tobacco: Never   Vaping Use    Vaping Use: Never used   Substance and Sexual Activity    Alcohol use: Not Currently     Comment: Quit drinking 2019     Drug use: No    Sexual activity: Not Currently     Partners: Male     Birth control/protection: None       NIH Stroke Scale:  Interval: baseline    April SADE Garcia  23 09:55 EDT

## 2023-08-16 NOTE — PLAN OF CARE
Goal Outcome Evaluation:              Outcome Evaluation: patient discharging home with instructions to follow up with opthalmology. Patient received dc instructions, verbalized understanding and denied having questions. Patient ambulated out of the observation unit with a steady gait.

## 2023-08-16 NOTE — PROGRESS NOTES
Clinical Pharmacy Services: Medication History    Stacey Meyer is a 67 y.o. female presenting to [facility] for   Chief Complaint   Patient presents with    Blurred Vision    Headache       She  has a past medical history of Allergic (?), Anxiety, Closed nondisplaced fracture of fifth right metatarsal bone (7/27/2023), Degeneration, intervertebral disc, thoracic, Depression, Depression (06/04/2016), Erosive osteoarthritis, History of peptic ulcer, History of transfusion, Hypertension, Hypothyroidism (many years), Knee swelling (09/2009), OA (osteoarthritis) of hip, Osteoarthritis, PONV (postoperative nausea and vomiting), Primary osteoarthritis of right hip, Thyroid disease, and Visual impairment (readers).    Allergies as of 08/16/2023 - Reviewed 08/16/2023   Allergen Reaction Noted    Erythromycin Rash 02/20/2016    Metronidazole Rash and Swelling 09/18/2012    Oxycodone-aspirin Nausea And Vomiting and Nausea Only 09/18/2012    Sulfa antibiotics Rash 02/11/2016    Tetracycline Rash 09/18/2012    Tetracyclines & related Rash 02/11/2016       Medication information was obtained from: Patient   Pharmacy and Phone Number:     Prior to Admission Medications       Prescriptions Last Dose Informant Patient Reported? Taking?    escitalopram (Lexapro) 10 MG tablet 8/15/2023 Self No Yes    Take 1 tablet by mouth Daily.    hydroxychloroquine (PLAQUENIL) 200 MG tablet 8/15/2023 Self Yes Yes    Take 2 tablets by mouth Every Other Day.    hydroxychloroquine (PLAQUENIL) 200 MG tablet Past Week Self Yes Yes    Take 1 tablet by mouth Every Other Day.    levothyroxine (SYNTHROID, LEVOTHROID) 112 MCG tablet 8/15/2023 Self No Yes    TAKE 1 TABLET BY MOUTH EVERY DAY    Patient taking differently:  Take 1 tablet by mouth Daily.    Ibuprofen 3 %, Gabapentin 10 %, Baclofen 2 %, lidocaine 4 %   No No    Apply 1-2 g topically to the appropriate area as directed 3 (Three) to 4 (Four) times daily.              Medication  notes:     This medication list is complete to the best of my knowledge as of 8/16/2023    Please call if questions.    Aleksey Landa  Medication History Technician  [phone]    8/16/2023 10:41 EDT

## 2023-08-16 NOTE — DISCHARGE INSTRUCTIONS
Follow up with your ophthalmologist in 1 week.  Return to the emergency department with worsening symptoms, uncontrolled pain, inability to tolerate oral liquids, fever greater than 101øF not controlled by Tylenol or as needed with emergent concerns.

## 2023-08-16 NOTE — ED PROVIDER NOTES
EMERGENCY DEPARTMENT ENCOUNTER    Room Number:  04/04  PCP: Kayli Tucker III NP-C  Historian: Patient      HPI:  Chief Complaint: Revision  A complete HPI/ROS/PMH/PSH/SH/FH are unobtainable due to: Nothing  Context: Stacey Meyer is a very pleasant afebrile ambulatory 67 y.o.  female who presents to the ED c/o blurry vision of left eye intermittently over the last few days.      She denies any trauma or injury.  She denies any symptoms here but states that she has had escalating symptoms over the last 4 days.      PAST MEDICAL HISTORY  Active Ambulatory Problems     Diagnosis Date Noted    Degeneration of lumbar intervertebral disc 02/29/2016    Degeneration, intervertebral disc, thoracic 02/29/2016    Fibromyalgia 06/04/2016    Transient insomnia 06/04/2016    Primary osteoarthritis of right hip 06/07/2016    Familial ligamentous laxity 06/07/2016    Hypertension 07/11/2016    OA (osteoarthritis) of hip 07/13/2016    Acquired hypothyroidism 07/05/2017    Rheumatoid arthritis of multiple sites with negative rheumatoid factor 07/05/2017    Gigantomastia 11/14/2018    Erosive osteoarthritis     Depression with anxiety 06/26/2023    Mixed hyperlipidemia 07/26/2023    Arthritis of foot 07/27/2023    Closed nondisplaced fracture of fifth right metatarsal bone 07/27/2023    Arthritis of first metatarsophalangeal (MTP) joint of right foot 07/27/2023     Resolved Ambulatory Problems     Diagnosis Date Noted    Depression 06/04/2016    Hypothyroidism (acquired) 10/09/2018    Cystitis 10/09/2018     Past Medical History:   Diagnosis Date    Allergic ?    Anxiety     History of peptic ulcer     History of transfusion     Hypothyroidism many years    Knee swelling 09/2009    Osteoarthritis     PONV (postoperative nausea and vomiting)     Thyroid disease     Visual impairment readers         PAST SURGICAL HISTORY  Past Surgical History:   Procedure Laterality Date    BILATERAL BREAST REDUCTION  "Bilateral 2018    Procedure: BREAST REDUCTION BILATERAL, NEW IMAGE;  Surgeon: Myranda Perea MD;  Location: [facility] MAIN OR;  Service: New Image    BREAST SURGERY  2018    BUNIONECTOMY Left     COLONOSCOPY          COLONOSCOPY N/A 2019    Procedure: COLONOSCOPY WITH COLD POLYPECTOMY x 2;  Surgeon: Danny Costa MD;  [facility];  Service: Gastroenterology    ECTOPIC PREGNANCY SURGERY Bilateral     2 SEPARATE SURGERIES    ENDOSCOPY N/A 2016    Procedure: ESOPHAGOGASTRODUODENOSCOPY WITH COLD BIOPSIES;  Surgeon: Danny Costa MD;  Location: [facility] ENDOSCOPY;  Service:     ENDOSCOPY N/A 2016    Procedure: ESOPHAGOGASTRODUODENOSCOPY;  Surgeon: Danny Costa MD;  Location: [facility] ENDOSCOPY;  Service:     FERTILITY SURGERY      FOOT SURGERY Bilateral     \"THEY BROKE MY 5TH TOE ON BOTH SIDES AND STRIGHTENED THEM\"     HAMMER TOE REPAIR Bilateral     HAND SURGERY  2021    left hand     JOINT REPLACEMENT Right 07/15/2020    hand    TONSILLECTOMY      TOTAL HIP ARTHROPLASTY Right 2016    Procedure: RIGHT TOTAL HIP ARTHROPLASTY ANTERIOR APPROACH;  Surgeon: Eric Clayton MD;  Location: [facility] MAIN OR;  Service:          FAMILY HISTORY  Family History   Problem Relation Age of Onset    Liver disease Mother          at 69 yoa     Autoimmune disease Mother     Stroke Maternal Aunt     Obesity Brother     Malig Hyperthermia Neg Hx          SOCIAL HISTORY  Social History     Socioeconomic History    Marital status:     Number of children: 2    Years of education: bachelor's degree   Tobacco Use    Smoking status: Former     Packs/day: 1.00     Years: 10.00     Pack years: 10.00     Types: Cigarettes     Quit date: 1986     Years since quittin.6    Smokeless tobacco: Never   Vaping Use    Vaping Use: Never used   Substance and Sexual Activity    Alcohol use: Not Currently     Comment: Quit drinking 2019     Drug use: No    Sexual " activity: Not Currently     Partners: Male     Birth control/protection: None         ALLERGIES  Erythromycin, Metronidazole, Oxycodone-aspirin, Sulfa antibiotics, Tetracycline, and Tetracyclines & related        REVIEW OF SYSTEMS  Review of Systems   Eyes:  Positive for visual disturbance. Negative for pain, discharge, redness and itching.          PHYSICAL EXAM  ED Triage Vitals [08/16/23 0800]   Temp Heart Rate Resp BP SpO2   97.2 øF (36.2 øC) 82 18 -- 98 %      Temp src Heart Rate Source Patient Position BP Location FiO2 (%)   Tympanic Monitor -- -- --       Physical Exam      GENERAL: Alert and oriented x4, no acute distress  HENT: nares patent, mucous membranes are moist and intact  EYES: no scleral icterus, normal extraocular movements that are painless, no scleral injection, pupils are PERRLA  CV: regular rhythm, normal rate, no murmurs or rubs appreciated, no peripheral edema  RESPIRATORY: normal effort, clear to all fields bilaterally, able to speak in full sentences without hint of distress  ABDOMEN: soft and nontender diffusely, bowel sounds WNL, no rebound or guarding  MUSCULOSKELETAL: no deformity, normal active range of motion of all extremities  NEURO: Alert and oriented x3, extraocular motion intact, pupils are equal and round reactive to light, cranial nerves II through XII are grossly intact, normal speech, moves all extremities well, 5 out of 5 strength all 4 extremities, sensation intact light touch all 4 extremities, no ataxia.  PSYCH: anxious/tearful, cooperative  SKIN: warm, dry    Vital signs and nursing notes reviewed.          LAB RESULTS  Recent Results (from the past 24 hour(s))   Protime-INR    Collection Time: 08/16/23  8:14 AM    Specimen: Blood   Result Value Ref Range    Protime 12.2 11.7 - 14.2 Seconds    INR 0.89 (L) 0.90 - 1.10   Comprehensive Metabolic Panel    Collection Time: 08/16/23  8:14 AM    Specimen: Blood   Result Value Ref Range    Glucose 101 (H) 65 - 99 mg/dL    BUN  15 8 - 23 mg/dL    Creatinine 0.65 0.57 - 1.00 mg/dL    Sodium 140 136 - 145 mmol/L    Potassium 4.4 3.5 - 5.2 mmol/L    Chloride 98 98 - 107 mmol/L    CO2 27.0 22.0 - 29.0 mmol/L    Calcium 10.4 8.6 - 10.5 mg/dL    Total Protein 7.2 6.0 - 8.5 g/dL    Albumin 4.9 3.5 - 5.2 g/dL    ALT (SGPT) 22 1 - 33 U/L    AST (SGOT) 31 1 - 32 U/L    Alkaline Phosphatase 70 39 - 117 U/L    Total Bilirubin 1.4 (H) 0.0 - 1.2 mg/dL    Globulin 2.3 gm/dL    A/G Ratio 2.1 g/dL    BUN/Creatinine Ratio 23.1 7.0 - 25.0    Anion Gap 15.0 5.0 - 15.0 mmol/L    eGFR 97.8 >60.0 mL/min/1.73   CBC Auto Differential    Collection Time: 08/16/23  8:14 AM    Specimen: Blood   Result Value Ref Range    WBC 4.50 3.40 - 10.80 10*3/mm3    RBC 4.71 3.77 - 5.28 10*6/mm3    Hemoglobin 14.5 12.0 - 15.9 g/dL    Hematocrit 43.7 34.0 - 46.6 %    MCV 92.8 79.0 - 97.0 fL    MCH 30.8 26.6 - 33.0 pg    MCHC 33.2 31.5 - 35.7 g/dL    RDW 13.1 12.3 - 15.4 %    RDW-SD 44.5 37.0 - 54.0 fl    MPV 9.3 6.0 - 12.0 fL    Platelets 397 140 - 450 10*3/mm3    Neutrophil % 59.3 42.7 - 76.0 %    Lymphocyte % 28.4 19.6 - 45.3 %    Monocyte % 10.4 5.0 - 12.0 %    Eosinophil % 1.3 0.3 - 6.2 %    Basophil % 0.4 0.0 - 1.5 %    Immature Grans % 0.2 0.0 - 0.5 %    Neutrophils, Absolute 2.66 1.70 - 7.00 10*3/mm3    Lymphocytes, Absolute 1.28 0.70 - 3.10 10*3/mm3    Monocytes, Absolute 0.47 0.10 - 0.90 10*3/mm3    Eosinophils, Absolute 0.06 0.00 - 0.40 10*3/mm3    Basophils, Absolute 0.02 0.00 - 0.20 10*3/mm3    Immature Grans, Absolute 0.01 0.00 - 0.05 10*3/mm3    nRBC 0.0 0.0 - 0.2 /100 WBC   Single High Sensitivity Troponin T    Collection Time: 08/16/23  8:14 AM    Specimen: Blood   Result Value Ref Range    HS Troponin T 9 <10 ng/L   Sedimentation Rate    Collection Time: 08/16/23  8:14 AM    Specimen: Blood   Result Value Ref Range    Sed Rate 7 0 - 30 mm/hr   C-reactive Protein    Collection Time: 08/16/23  8:14 AM    Specimen: Blood   Result Value Ref Range    C-Reactive Protein  <0.30 0.00 - 0.50 mg/dL   Lipid Panel    Collection Time: 08/16/23  8:14 AM    Specimen: Blood   Result Value Ref Range    Total Cholesterol 254 (H) 0 - 200 mg/dL    Triglycerides 83 0 - 150 mg/dL    HDL Cholesterol 88 (H) 40 - 60 mg/dL    LDL Cholesterol  152 (H) 0 - 100 mg/dL    VLDL Cholesterol 14 5 - 40 mg/dL    LDL/HDL Ratio 1.70    Hemoglobin A1c    Collection Time: 08/16/23  8:14 AM    Specimen: Blood   Result Value Ref Range    Hemoglobin A1C 5.20 4.80 - 5.60 %   ECG 12 Lead Rhythm Change    Collection Time: 08/16/23  9:10 AM   Result Value Ref Range    QT Interval 426 ms       Ordered the above labs and reviewed the results.        RADIOLOGY  MRI Orbit Face Neck With & Without Contrast, MRI Angiogram Head Without Contrast, MRI Angiogram Neck With & Without Contrast    Result Date: 8/16/2023  MRI OF THE ORBITS WITH AND WITHOUT CONTRAST, MRA OF THE HEAD WITHOUT CONTRAST, AND MRA OF THE NECK WITH AND WITHOUT CONTRAST  CLINICAL HISTORY: Blurred vision and headache for 3 days.  TECHNIQUE: MRI of the orbits was obtained with axial and coronal T1, axial and coronal pre and postgadolinium fat-saturated T1, and axial diffusion weighted images.  FINDINGS:  There are no abnormal foci of restricted diffusion within the orbits. The optic nerves and optic chiasm have normal signal intensity. The globes, extraocular muscles, lacrimal glands, and retrobulbar fat are all unremarkable in appearance.       Unremarkable MRI of the orbits.   TECHNIQUE: MRA of the head was obtained with 3D time-of-flight imaging technique. Maximal intensity projection reconstructed images were obtained.  FINDINGS:  There is normal flow related enhancement within the internal carotid arteries, middle cerebral arteries, and anterior cerebral arteries. Also, the vertebral arteries, basilar artery, and posterior cerebral arteries have normal flow related enhancement.  IMPRESSION:  Unremarkable MRA of the head.   TECHNIQUE: MRA of the neck was  obtained with combination of 3D time-of-flight imaging technique noncontrast enhanced MRA of the neck. Maximal intensity projection reconstructed images were obtained.  FINDINGS:  There is no significant NASCET stenosis within either internal carotid artery. The vertebral arteries are unremarkable in appearance. No significant great vessel origin stenosis is noted.  IMPRESSION:  Unremarkable MRA of the neck.      This report was finalized on 8/16/2023 3:14 PM by Dr. Deon Wilson M.D.      XR Chest 1 View    Result Date: 8/16/2023  XR CHEST 1 VW-  HISTORY: Female who is 67 years-old, hypertension  TECHNIQUE: Frontal view of the chest  COMPARISON: 4/22/2022  FINDINGS: Heart, mediastinum and pulmonary vasculature are unremarkable. No focal pulmonary consolidation, pleural effusion, or pneumothorax. No acute osseous process.      No evidence for acute pulmonary process. Follow-up as clinical indications persist.  This report was finalized on 8/16/2023 9:28 AM by Dr. Vijay Alarcon M.D.      CT Orbits With Contrast, CT Angiogram Neck, CT Angiogram Head    Result Date: 8/16/2023  CT ANGIOGRAM OF THE HEAD AND NECK WITH CONTRAST AND CT SCAN OF THE ORBITS WITH CONTRAST  CLINICAL HISTORY: Headache and left-sided visual changes.  TECHNIQUE: CT angiogram of the head and neck was obtained with 1 mm axial images following the administration of IV contrast. Sagittal, coronal, and 3-dimensional reconstructed images were obtained. Additionally, a CT scan of the head was obtained with 3 mm axial images before and after the administration of IV contrast.  COMPARISON: CT head dated 07/15/2016.  FINDINGS:  CTA NECK: There is a classic configuration of the aortic arch. There is no significant great vessel origin stenosis. The vertebral arteries are unremarkable in appearance. Mild atherosclerotic changes are identified within the internal carotid arteries without a significant NASCET stenosis.  CTA HEAD:   The vertebral arteries,  basilar artery, and posterior cerebral arteries are within normal limits. The internal carotid arteries are remarkable for mild atherosclerotic calcifications within the carotid siphons. However, these do not produce any significant degree of luminal compromise. The middle and anterior cerebral arteries are unremarkable in appearance.  The ventricles, sulci, and cisterns are age-appropriate. Mild-to-moderate changes of chronic small vessel ischemic phenomena are appreciated. The gray-white matter differentiation is within normal limits. The basal ganglia and thalami are unremarkable in appearance. The posterior fossa structures are within normal limits. Incidental note is made of atherosclerotic vascular calcifications. No abnormal foci of contrast enhancement are noted within the brain parenchyma.       Unremarkable CT angiographic images of the head and neck.  Mild-to-moderate changes of chronic small vessel ischemic phenomena are incidentally noted.   Please take note that this is a preliminary result until the receipt of the reconstructed images from [facility] at which time this report will be finalized.  TECHNIQUE: CT scan of the orbits was obtained with 2 mm axial and coronal soft tissue algorithm as well as 1 mm axial, coronal, and sagittal bone algorithm images following the administration of IV contrast.  FINDINGS:  The globes, extraocular muscles, optic nerves, retrobulbar fat, and lacrimal glands are all unremarkable in appearance. No abnormalities are appreciated within the visualized maxillofacial structures.  IMPRESSION:  Unremarkable CT scan of the orbits.   Radiation dose reduction techniques were utilized, including automated exposure control and exposure modulation based on body size.   This report was finalized on 8/16/2023 10:57 AM by Dr. Deon Wilson M.D.       Ordered the above noted radiological studies. Reviewed by me in PACS.            PROCEDURES  Procedures        MEDICATIONS GIVEN IN  ER  Medications   sodium chloride 0.9 % flush 10 mL (has no administration in time range)   sodium chloride 0.9 % flush 10 mL (10 mL Intravenous Given 8/16/23 1044)   sodium chloride 0.9 % flush 10 mL (has no administration in time range)   sodium chloride 0.9 % infusion 40 mL (has no administration in time range)   ondansetron (ZOFRAN) tablet 4 mg (has no administration in time range)     Or   ondansetron (ZOFRAN) injection 4 mg (has no administration in time range)   nitroglycerin (NITROSTAT) SL tablet 0.4 mg (has no administration in time range)   acetaminophen (TYLENOL) tablet 650 mg (has no administration in time range)   melatonin tablet 5 mg (has no administration in time range)   escitalopram (LEXAPRO) tablet 10 mg (has no administration in time range)   hydroxychloroquine (PLAQUENIL) tablet 200 mg (has no administration in time range)   hydroxychloroquine (PLAQUENIL) tablet 400 mg (has no administration in time range)   levothyroxine (SYNTHROID, LEVOTHROID) tablet 112 mcg (has no administration in time range)   aspirin chewable tablet 324 mg (324 mg Oral Given 8/16/23 0913)   iopamidol (ISOVUE-370) 76 % injection 100 mL (85 mL Intravenous Given by Other 8/16/23 0850)   gadobenate dimeglumine (MULTIHANCE) injection 9 mL (9 mL Intravenous Given 8/16/23 1318)                   MEDICAL DECISION MAKING, PROGRESS, and CONSULTS    All labs have been independently reviewed by me.  All radiology studies have been reviewed by me and I have also reviewed the radiology report.   EKG's independently viewed and interpreted by me.  Discussion below represents my analysis of pertinent findings related to patient's condition, differential diagnosis, treatment plan and final disposition.      Additional sources:  - Discussed/ obtained information from independent historians:  hx obtained from pt    - External (non-ED) record review: Office visit 7/26/2023 marianne SWIFT prescribed plaquenil 200mg 2 tabs every other  "day    - Chronic or social conditions impacting care: none    - Shared decision making:  pt offered admission vs outpt eval and she prefers to be admitted for neuro/ophtho eval      Orders placed during this visit:  No orders of the defined types were placed in this encounter.        Additional orders considered but not ordered:  I considered ordering an MRI but will defer to admitting team so that they are able to follow imaging results        Differential diagnosis includes but is not limited to:    CVA, retinal detachment, hypertensive urgency      Independent interpretation of labs, radiology studies, and discussions with consultants:  ED Course as of 08/16/23 1600   Wed Aug 16, 2023   0806 Left eye intermittent blurry vision since Sunday, c/o \"pressure behind eye.\" States it feels like a film over her eye. R eye unaffected. BP elevated here, told at last checkup that BP was normal. Had an eyeexaminthe last month that was normal, is on plaquenil for RA at [facility] [place] in [place] with dr. Lynda evans.she has never had vision changes like this previously . No other neuro issues. Started back on lexapro in july for lots of stress with family. Her dad hasd a hx of stroke and her friend recently had a detached retina [AH]   0833 Phone call with dr wahl.  Discussed the patient, relevant history, exam, diagnostics, ED findings/progress, and concerns. They agree to consult. Thinks may benefit from eye exam, recommends MRI of orbit to r/o optic neuritis, will see pt   [AH]   0922 EKG 8/16/2023 at 9:10 AM per my independent rotation is normal sinus rhythm, rate of 65, QTc 443, no acute ischemia [AH]   0923 XR Chest 1 View  Per my independent interpretation is no pneumothorax, no cardiomegaly [AH]   0938 Phone call with dr mccartney.  Discussed the patient, relevant history, exam, diagnostics, ED findings/progress, and concerns. Will come see pt around noon today. Agrees MRI of orbit is reasonable   [AH]   0952 " BP: 149/86  No meds given besides aspirin at this point []   0561 Phone call with cornelius dolan PA-C.  Discussed the patient, relevant history, exam, diagnostics, ED findings/progress, and concerns. They agree to admit to the ED Observation unit on behalf of Dr. Taylor   []      ED Course User Index  [] Shelly Woods APRN             I have worn appropriate PPE during this patient encounter, sanitized my hands both with entering and exiting patient's room.      DIAGNOSIS  Final diagnoses:   Blurry vision, left eye   Hypertension, unspecified type   History of rheumatoid arthritis         DISPOSITION  Admitted to ED Obs unit            Latest Documented Vital Signs:  As of 08:02 EDT  BP-   HR- 82 Temp- 97.2 øF (36.2 øC) (Tympanic) O2 sat- 98%              --    Please note that portions of this were completed with a voice recognition program.       Note Disclaimer: At [facility], we believe that sharing information builds trust and better relationships. You are receiving this note because you are receiving care at [facility] or recently visited. It is possible you will see health information before a provider has talked with you about it. This kind of information can be easy to misunderstand. To help you fully understand what it means for your health, we urge you to discuss this note with your provider.             Shelly Woods APRN  08/16/23 8532

## 2023-08-16 NOTE — CONSULTS
OPHTHALMOLOGY CONSULT NOTE    Patient Identification:  Name: Stacey Meyer  Age: 67 y.o.  Sex: female  :  1955  MRN: [id number]                                               Requesting Physician: per order  Reason for consult: Blurry Vision     History of Present Illness:  67 y.o. female presents for CC of blurry vision in the left eye. She reports no pain. States the vision has been fluctuating in the eye. She reports that she normally just wears reading glasses for presbyopia. She recently had an evaluation with her Ophthalmologist and was told things looked ok. Gets routine exams for plaquenil surveillance.     Problem List:  Principal Problem:    Blurry vision      Past Medical History:  Past Medical History:   Diagnosis Date    Allergic ?    percadan sulfa emycin tetriccyclin flagel    Anxiety     Closed nondisplaced fracture of fifth right metatarsal bone 2023    Degeneration, intervertebral disc, thoracic     Depression     Depression 2016    Erosive osteoarthritis     Dr Galeano    History of peptic ulcer     History of transfusion     AFTER HIP REPLACEMENT     Hypertension     Hypothyroidism many years    Knee swelling 2009    OA (osteoarthritis) of hip     Osteoarthritis     PONV (postoperative nausea and vomiting)     Primary osteoarthritis of right hip     Rheumatoid arthritis     Thyroid disease     HYPOTHYROID    Visual impairment readers       Past Surgical History:  Past Surgical History:   Procedure Laterality Date    BILATERAL BREAST REDUCTION Bilateral 2018    Procedure: BREAST REDUCTION BILATERAL, NEW IMAGE;  Surgeon: Myranda Perea MD;  Location: [facility] OR;  Service: New Image    BREAST SURGERY  2018    BUNIONECTOMY Left     COLONOSCOPY      2009    COLONOSCOPY N/A 2019    Procedure: COLONOSCOPY WITH COLD POLYPECTOMY x 2;  Surgeon: Danny Costa MD;  Location: [facility] ENDOSCOPY;  Service: Gastroenterology    ECTOPIC PREGNANCY SURGERY  "Bilateral     2 SEPARATE SURGERIES    ENDOSCOPY N/A 05/24/2016    Procedure: ESOPHAGOGASTRODUODENOSCOPY WITH COLD BIOPSIES;  Surgeon: Danny Costa MD;  Location: [facility] ENDOSCOPY;  Service:     ENDOSCOPY N/A 11/01/2016    Procedure: ESOPHAGOGASTRODUODENOSCOPY;  Surgeon: Danny Costa MD;  Location: [facility] ENDOSCOPY;  Service:     FERTILITY SURGERY      FOOT SURGERY Bilateral     \"THEY BROKE MY 5TH TOE ON BOTH SIDES AND STRIGHTENED THEM\"     HAMMER TOE REPAIR Bilateral     HAND SURGERY  04/28/2021    left hand     JOINT REPLACEMENT Right 07/15/2020    hand    TONSILLECTOMY      TOTAL HIP ARTHROPLASTY Right 07/13/2016    Procedure: RIGHT TOTAL HIP ARTHROPLASTY ANTERIOR APPROACH;  Surgeon: Eric Clayton MD;  Location: [facility] MAIN OR;  Service:         Past Ocular History:    ROS:  Positive for headache    Eyes: As per HPI  Ocular Medication: N/A    Home Meds:  Medications Prior to Admission   Medication Sig Dispense Refill Last Dose    escitalopram (Lexapro) 10 MG tablet Take 1 tablet by mouth Daily. 90 tablet 1 8/15/2023    hydroxychloroquine (PLAQUENIL) 200 MG tablet Take 2 tablets by mouth Every Other Day.   8/15/2023    hydroxychloroquine (PLAQUENIL) 200 MG tablet Take 1 tablet by mouth Every Other Day.   Past Week    levothyroxine (SYNTHROID, LEVOTHROID) 112 MCG tablet TAKE 1 TABLET BY MOUTH EVERY DAY (Patient taking differently: Take 1 tablet by mouth Daily.) 90 tablet 1 8/15/2023    Ibuprofen 3 %, Gabapentin 10 %, Baclofen 2 %, lidocaine 4 % Apply 1-2 g topically to the appropriate area as directed 3 (Three) to 4 (Four) times daily. 90 g 1        Current Meds:     Current Facility-Administered Medications:     acetaminophen (TYLENOL) tablet 650 mg, 650 mg, Oral, Q4H PRN, Janiya Montero PA    escitalopram (LEXAPRO) tablet 10 mg, 10 mg, Oral, Daily, Janiya Montero PA    hydroxychloroquine (PLAQUENIL) tablet 200 mg, 200 mg, Oral, Every Other Day, Janiya Montero PA    [START ON 8/17/2023] " hydroxychloroquine (PLAQUENIL) tablet 400 mg, 400 mg, Oral, Every Other Day, Janiya Montero PA    levothyroxine (SYNTHROID, LEVOTHROID) tablet 112 mcg, 112 mcg, Oral, Daily, Janiya Montero PA    lisinopril (PRINIVIL,ZESTRIL) tablet 10 mg, 10 mg, Oral, Q24H, Janiya Montero PA    melatonin tablet 5 mg, 5 mg, Oral, Nightly PRN, Janiya Montero PA    nitroglycerin (NITROSTAT) SL tablet 0.4 mg, 0.4 mg, Sublingual, Q5 Min PRN, Janiya Montero PA    ondansetron (ZOFRAN) tablet 4 mg, 4 mg, Oral, Q6H PRN **OR** ondansetron (ZOFRAN) injection 4 mg, 4 mg, Intravenous, Q6H PRN, Janiya Montero PA    [COMPLETED] Insert Peripheral IV, , , Once **AND** sodium chloride 0.9 % flush 10 mL, 10 mL, Intravenous, PRN, Herth, Shelly, APRN    sodium chloride 0.9 % flush 10 mL, 10 mL, Intravenous, Q12H, Janiya Montero PA, 10 mL at 23 1044    sodium chloride 0.9 % flush 10 mL, 10 mL, Intravenous, PRN, Janiya Montero PA    sodium chloride 0.9 % infusion 40 mL, 40 mL, Intravenous, PRN, Janiya Montero PA    Allergies:  Allergies   Allergen Reactions    Erythromycin Rash    Metronidazole Rash and Swelling    Oxycodone-Aspirin Nausea And Vomiting and Nausea Only    Sulfa Antibiotics Rash    Tetracycline Rash    Tetracyclines & Related Rash       Social History:   Social History     Tobacco Use    Smoking status: Former     Packs/day: 1.00     Years: 10.00     Pack years: 10.00     Types: Cigarettes     Quit date: 1986     Years since quittin.6    Smokeless tobacco: Never   Substance Use Topics    Alcohol use: Yes     Comment: occasional etoh        Family History:  Denies h/o glaucoma, retinal detachment, strabismus, amblyopia    Objective:  General Appearance: NAD    Exam:    [facility] near P T EOM    20/ PH 20/ 40-1 5->3mm no apd Soft Full    20/ PH 20/ 40-1 5->3mm no apd Soft Full      SLE/PLE         OD OS   External/Lid wnl wnl   Conj/sclera White/quiet White/quiet   Cornea clear clear   Anterior Chamber  formed formed   Iris Round/reactive Round/reactive   Lens 1+ cc 1+ cc   Vitreous clear clear     Dilated Fundus Exam: 12:52 EDT  OD - 0.2 optic nerve, no signs of papilledema with clear margins, macula, vessels, periphery  OS - 0.2 optic nerve, no signs of papilledema with clear margins, macula- PVD with syneresis, vessels, periphery-well visualized without any holes/tears seen    Imaging:  XR Chest 1 View   Final Result   No evidence for acute pulmonary process. Follow-up as   clinical indications persist.       This report was finalized on 8/16/2023 9:28 AM by Dr. Vijay Alarcon M.D.          CT Orbits With Contrast   Final Result       Unremarkable CT angiographic images of the head and neck.       Mild-to-moderate changes of chronic small vessel ischemic phenomena are   incidentally noted.           Please take note that this is a preliminary result until the receipt of   the reconstructed images from [facility] at which time this report will be   finalized.       TECHNIQUE: CT scan of the orbits was obtained with 2 mm axial and   coronal soft tissue algorithm as well as 1 mm axial, coronal, and   sagittal bone algorithm images following the administration of IV   contrast.       FINDINGS:       The globes, extraocular muscles, optic nerves, retrobulbar fat, and   lacrimal glands are all unremarkable in appearance. No abnormalities are   appreciated within the visualized maxillofacial structures.       IMPRESSION:       Unremarkable CT scan of the orbits.           Radiation dose reduction techniques were utilized, including automated   exposure control and exposure modulation based on body size.           This report was finalized on 8/16/2023 10:57 AM by Dr. Deon Wilson M.D.          CT Angiogram Neck   Final Result       Unremarkable CT angiographic images of the head and neck.       Mild-to-moderate changes of chronic small vessel ischemic phenomena are   incidentally noted.           Please take note  that this is a preliminary result until the receipt of   the reconstructed images from [facility] at which time this report will be   finalized.       TECHNIQUE: CT scan of the orbits was obtained with 2 mm axial and   coronal soft tissue algorithm as well as 1 mm axial, coronal, and   sagittal bone algorithm images following the administration of IV   contrast.       FINDINGS:       The globes, extraocular muscles, optic nerves, retrobulbar fat, and   lacrimal glands are all unremarkable in appearance. No abnormalities are   appreciated within the visualized maxillofacial structures.       IMPRESSION:       Unremarkable CT scan of the orbits.           Radiation dose reduction techniques were utilized, including automated   exposure control and exposure modulation based on body size.           This report was finalized on 8/16/2023 10:57 AM by Dr. Deon Wilson M.D.          CT Angiogram Head   Final Result       Unremarkable CT angiographic images of the head and neck.       Mild-to-moderate changes of chronic small vessel ischemic phenomena are   incidentally noted.           Please take note that this is a preliminary result until the receipt of   the reconstructed images from [facility] at which time this report will be   finalized.       TECHNIQUE: CT scan of the orbits was obtained with 2 mm axial and   coronal soft tissue algorithm as well as 1 mm axial, coronal, and   sagittal bone algorithm images following the administration of IV   contrast.       FINDINGS:       The globes, extraocular muscles, optic nerves, retrobulbar fat, and   lacrimal glands are all unremarkable in appearance. No abnormalities are   appreciated within the visualized maxillofacial structures.       IMPRESSION:       Unremarkable CT scan of the orbits.           Radiation dose reduction techniques were utilized, including automated   exposure control and exposure modulation based on body size.           This report was finalized on 8/16/2023  10:57 AM by Dr. Deon Wilson M.D.          MRI Orbit Face Neck With & Without Contrast    (Results Pending)   MRI Angiogram Head Without Contrast    (Results Pending)   MRI Angiogram Neck With & Without Contrast    (Results Pending)       Data Review:  Radiology review: MRI brain and orbits is pending. CT scan is unremarkable.     Assessment/Recommendations:  Stacey Meyer is a 67 y.o. with hx of Rheumatoid Arthritis presents for CC of left eye blurry vision.     1) Posterior Vitreous Detachment OS-   -No holes or tears on Dilated Exam  -Explained to patient RD precautions, 5% chance of developing hole/tear in the next 30 days and to get evaluated right away should new symptoms such as flashes of light or new floaters occur. Otherwise ok to follow up with regular Ophthalmologist in one week    2) Age Related Nuclear Cataracts OU  -Not visually significant        Patient needs to follow up with Home Ophthalmologist in 1 week.     Thank you for the consult.    Alfredo Irene MD  8/16/2023 12:52 EDT

## 2023-08-16 NOTE — OUTREACH NOTE
Prep Survey      Flowsheet Row Responses   [facility] facility patient discharged from? [place]   Is LACE score < 7 ? Yes   Eligibility [facility]   Date of Admission 08/16/23   Date of Discharge 08/16/23   Discharge Disposition Home or Self Care   Discharge diagnosis blurry vision   Does the patient have one of the following disease processes/diagnoses(primary or secondary)? Other   Does the patient have Home health ordered? No   Is there a DME ordered? No   Prep survey completed? Yes            Esmer TOWNSEND - Registered Nurse

## 2023-08-16 NOTE — DISCHARGE SUMMARY
ED OBSERVATION PROGRESS/DISCHARGE SUMMARY    Date of Admission: 8/16/2023   LOS: 0 days   PCP: Kayli Tucker III, NP-C    Subjective  patient reports vision is back to baseline    Hospital Outcome: 67-year-old female admitted to the observation unit for further evaluation of visual disturbance.  Patient had CTA head and neck, CT orbits as well as MRI of orbits and MRA of head and neck.  Discussed with radiology, these are all negative acute.  Patient was seen by ophthalmology, Dr. Irene.  His eye exam reveals left posterior vitreous detachment which would explain patient's symptoms.  Patient will follow-up with her ophthalmologist within the week, she already has appointment scheduled.  Discussed with neurology, Dr. Ruiz.  He also reviewed patient's imaging and we agreed to cancel consultation as posterior vitreous detachment explains patient's symptoms.  Discussed with patient, usual return to ER precautions advised, patient expressed understanding and is in agreement with plan.    ROS:  General: no fevers, chills  Respiratory: no cough, dyspnea  Cardiovascular: no chest pain, palpitations  Abdomen: No abdominal pain, nausea, vomiting, or diarrhea  Neurologic: No focal weakness    Objective   Physical Exam:  I have reviewed the vital signs.  Temp:  [97.2 øF (36.2 øC)-98.2 øF (36.8 øC)] 98.2 øF (36.8 øC)  Heart Rate:  [62-82] 65  Resp:  [16-18] 16  BP: (125-189)/() 125/86  General Appearance:    Alert, cooperative, no distress  Head:    Normocephalic, atraumatic  Eyes:    Sclerae anicteric  Neck:   Supple, no mass  Lungs: Clear to auscultation bilaterally, respirations unlabored  Heart: Regular rate and rhythm, S1 and S2 normal, no murmur, rub or gallop  Abdomen:  Soft, nontender, bowel sounds active, nondistended  Extremities: No clubbing, cyanosis, or edema to lower extremities  Pulses:  2+ and symmetric in distal lower extremities  Skin: No rashes   Neurologic: Oriented x3, Normal  strength to extremities    Results Review:    I have reviewed the labs, radiology results and diagnostic studies.    Results from last 7 days   Lab Units 08/16/23  0814   WBC 10*3/mm3 4.50   HEMOGLOBIN g/dL 14.5   HEMATOCRIT % 43.7   PLATELETS 10*3/mm3 397     Results from last 7 days   Lab Units 08/16/23  0814   SODIUM mmol/L 140   POTASSIUM mmol/L 4.4   CHLORIDE mmol/L 98   CO2 mmol/L 27.0   BUN mg/dL 15   CREATININE mg/dL 0.65   CALCIUM mg/dL 10.4   BILIRUBIN mg/dL 1.4*   ALK PHOS U/L 70   ALT (SGPT) U/L 22   AST (SGOT) U/L 31   GLUCOSE mg/dL 101*     Imaging Results (Last 24 Hours)       Procedure Component Value Units Date/Time    MRI Orbit Face Neck With & Without Contrast [[id number]] Collected: 08/16/23 1422     Updated: 08/16/23 1517    Narrative:      MRI OF THE ORBITS WITH AND WITHOUT CONTRAST, MRA OF THE HEAD WITHOUT  CONTRAST, AND MRA OF THE NECK WITH AND WITHOUT CONTRAST     CLINICAL HISTORY: Blurred vision and headache for 3 days.     TECHNIQUE: MRI of the orbits was obtained with axial and coronal T1,  axial and coronal pre and postgadolinium fat-saturated T1, and axial  diffusion weighted images.     FINDINGS:     There are no abnormal foci of restricted diffusion within the orbits.  The optic nerves and optic chiasm have normal signal intensity. The  globes, extraocular muscles, lacrimal glands, and retrobulbar fat are  all unremarkable in appearance.       Impression:         Unremarkable MRI of the orbits.        TECHNIQUE: MRA of the head was obtained with 3D time-of-flight imaging  technique. Maximal intensity projection reconstructed images were  obtained.     FINDINGS:     There is normal flow related enhancement within the internal carotid  arteries, middle cerebral arteries, and anterior cerebral arteries.  Also, the vertebral arteries, basilar artery, and posterior cerebral  arteries have normal flow related enhancement.     IMPRESSION:     Unremarkable MRA of the head.        TECHNIQUE:  MRA of the neck was obtained with combination of 3D  time-of-flight imaging technique noncontrast enhanced MRA of the neck.  Maximal intensity projection reconstructed images were obtained.     FINDINGS:     There is no significant NASCET stenosis within either internal carotid  artery. The vertebral arteries are unremarkable in appearance. No  significant great vessel origin stenosis is noted.     IMPRESSION:     Unremarkable MRA of the neck.                 This report was finalized on 8/16/2023 3:14 PM by Dr. Deon Wilson M.D.       MRI Angiogram Head Without Contrast [[id number]] Collected: 08/16/23 1422     Updated: 08/16/23 1517    Narrative:      MRI OF THE ORBITS WITH AND WITHOUT CONTRAST, MRA OF THE HEAD WITHOUT  CONTRAST, AND MRA OF THE NECK WITH AND WITHOUT CONTRAST     CLINICAL HISTORY: Blurred vision and headache for 3 days.     TECHNIQUE: MRI of the orbits was obtained with axial and coronal T1,  axial and coronal pre and postgadolinium fat-saturated T1, and axial  diffusion weighted images.     FINDINGS:     There are no abnormal foci of restricted diffusion within the orbits.  The optic nerves and optic chiasm have normal signal intensity. The  globes, extraocular muscles, lacrimal glands, and retrobulbar fat are  all unremarkable in appearance.       Impression:         Unremarkable MRI of the orbits.        TECHNIQUE: MRA of the head was obtained with 3D time-of-flight imaging  technique. Maximal intensity projection reconstructed images were  obtained.     FINDINGS:     There is normal flow related enhancement within the internal carotid  arteries, middle cerebral arteries, and anterior cerebral arteries.  Also, the vertebral arteries, basilar artery, and posterior cerebral  arteries have normal flow related enhancement.     IMPRESSION:     Unremarkable MRA of the head.        TECHNIQUE: MRA of the neck was obtained with combination of 3D  time-of-flight imaging technique noncontrast enhanced MRA  of the neck.  Maximal intensity projection reconstructed images were obtained.     FINDINGS:     There is no significant NASCET stenosis within either internal carotid  artery. The vertebral arteries are unremarkable in appearance. No  significant great vessel origin stenosis is noted.     IMPRESSION:     Unremarkable MRA of the neck.                 This report was finalized on 8/16/2023 3:14 PM by Dr. Deon Wilson M.D.       MRI Angiogram Neck With & Without Contrast [[id number]] Collected: 08/16/23 1422     Updated: 08/16/23 1517    Narrative:      MRI OF THE ORBITS WITH AND WITHOUT CONTRAST, MRA OF THE HEAD WITHOUT  CONTRAST, AND MRA OF THE NECK WITH AND WITHOUT CONTRAST     CLINICAL HISTORY: Blurred vision and headache for 3 days.     TECHNIQUE: MRI of the orbits was obtained with axial and coronal T1,  axial and coronal pre and postgadolinium fat-saturated T1, and axial  diffusion weighted images.     FINDINGS:     There are no abnormal foci of restricted diffusion within the orbits.  The optic nerves and optic chiasm have normal signal intensity. The  globes, extraocular muscles, lacrimal glands, and retrobulbar fat are  all unremarkable in appearance.       Impression:         Unremarkable MRI of the orbits.        TECHNIQUE: MRA of the head was obtained with 3D time-of-flight imaging  technique. Maximal intensity projection reconstructed images were  obtained.     FINDINGS:     There is normal flow related enhancement within the internal carotid  arteries, middle cerebral arteries, and anterior cerebral arteries.  Also, the vertebral arteries, basilar artery, and posterior cerebral  arteries have normal flow related enhancement.     IMPRESSION:     Unremarkable MRA of the head.        TECHNIQUE: MRA of the neck was obtained with combination of 3D  time-of-flight imaging technique noncontrast enhanced MRA of the neck.  Maximal intensity projection reconstructed images were obtained.     FINDINGS:     There  is no significant NASCET stenosis within either internal carotid  artery. The vertebral arteries are unremarkable in appearance. No  significant great vessel origin stenosis is noted.     IMPRESSION:     Unremarkable MRA of the neck.                 This report was finalized on 8/16/2023 3:14 PM by Dr. Deon Wilson M.D.       CT Orbits With Contrast [[id number]] Collected: 08/16/23 0941     Updated: 08/16/23 1100    Narrative:      CT ANGIOGRAM OF THE HEAD AND NECK WITH CONTRAST AND CT SCAN OF THE  ORBITS WITH CONTRAST     CLINICAL HISTORY: Headache and left-sided visual changes.     TECHNIQUE: CT angiogram of the head and neck was obtained with 1 mm  axial images following the administration of IV contrast. Sagittal,  coronal, and 3-dimensional reconstructed images were obtained.  Additionally, a CT scan of the head was obtained with 3 mm axial images  before and after the administration of IV contrast.     COMPARISON: CT head dated 07/15/2016.     FINDINGS:     CTA NECK: There is a classic configuration of the aortic arch. There is  no significant great vessel origin stenosis. The vertebral arteries are  unremarkable in appearance. Mild atherosclerotic changes are identified  within the internal carotid arteries without a significant NASCET  stenosis.     CTA HEAD:   The vertebral arteries, basilar artery, and posterior  cerebral arteries are within normal limits. The internal carotid  arteries are remarkable for mild atherosclerotic calcifications within  the carotid siphons. However, these do not produce any significant  degree of luminal compromise. The middle and anterior cerebral arteries  are unremarkable in appearance.     The ventricles, sulci, and cisterns are age-appropriate.  Mild-to-moderate changes of chronic small vessel ischemic phenomena are  appreciated. The gray-white matter differentiation is within normal  limits. The basal ganglia and thalami are unremarkable in appearance.  The posterior  fossa structures are within normal limits. Incidental note  is made of atherosclerotic vascular calcifications. No abnormal foci of  contrast enhancement are noted within the brain parenchyma.       Impression:         Unremarkable CT angiographic images of the head and neck.     Mild-to-moderate changes of chronic small vessel ischemic phenomena are  incidentally noted.        Please take note that this is a preliminary result until the receipt of  the reconstructed images from [employer] at which time this report will be  finalized.     TECHNIQUE: CT scan of the orbits was obtained with 2 mm axial and  coronal soft tissue algorithm as well as 1 mm axial, coronal, and  sagittal bone algorithm images following the administration of IV  contrast.     FINDINGS:     The globes, extraocular muscles, optic nerves, retrobulbar fat, and  lacrimal glands are all unremarkable in appearance. No abnormalities are  appreciated within the visualized maxillofacial structures.     IMPRESSION:     Unremarkable CT scan of the orbits.        Radiation dose reduction techniques were utilized, including automated  exposure control and exposure modulation based on body size.        This report was finalized on 8/16/2023 10:57 AM by Dr. Deon Wilson M.D.       CT Angiogram Neck [[id number]] Collected: 08/16/23 0941     Updated: 08/16/23 1100    Narrative:      CT ANGIOGRAM OF THE HEAD AND NECK WITH CONTRAST AND CT SCAN OF THE  ORBITS WITH CONTRAST     CLINICAL HISTORY: Headache and left-sided visual changes.     TECHNIQUE: CT angiogram of the head and neck was obtained with 1 mm  axial images following the administration of IV contrast. Sagittal,  coronal, and 3-dimensional reconstructed images were obtained.  Additionally, a CT scan of the head was obtained with 3 mm axial images  before and after the administration of IV contrast.     COMPARISON: CT head dated 07/15/2016.     FINDINGS:     CTA NECK: There is a classic configuration of the  aortic arch. There is  no significant great vessel origin stenosis. The vertebral arteries are  unremarkable in appearance. Mild atherosclerotic changes are identified  within the internal carotid arteries without a significant NASCET  stenosis.     CTA HEAD:   The vertebral arteries, basilar artery, and posterior  cerebral arteries are within normal limits. The internal carotid  arteries are remarkable for mild atherosclerotic calcifications within  the carotid siphons. However, these do not produce any significant  degree of luminal compromise. The middle and anterior cerebral arteries  are unremarkable in appearance.     The ventricles, sulci, and cisterns are age-appropriate.  Mild-to-moderate changes of chronic small vessel ischemic phenomena are  appreciated. The gray-white matter differentiation is within normal  limits. The basal ganglia and thalami are unremarkable in appearance.  The posterior fossa structures are within normal limits. Incidental note  is made of atherosclerotic vascular calcifications. No abnormal foci of  contrast enhancement are noted within the brain parenchyma.       Impression:         Unremarkable CT angiographic images of the head and neck.     Mild-to-moderate changes of chronic small vessel ischemic phenomena are  incidentally noted.        Please take note that this is a preliminary result until the receipt of  the reconstructed images from [employer] at which time this report will be  finalized.     TECHNIQUE: CT scan of the orbits was obtained with 2 mm axial and  coronal soft tissue algorithm as well as 1 mm axial, coronal, and  sagittal bone algorithm images following the administration of IV  contrast.     FINDINGS:     The globes, extraocular muscles, optic nerves, retrobulbar fat, and  lacrimal glands are all unremarkable in appearance. No abnormalities are  appreciated within the visualized maxillofacial structures.     IMPRESSION:     Unremarkable CT scan of the orbits.         Radiation dose reduction techniques were utilized, including automated  exposure control and exposure modulation based on body size.        This report was finalized on 8/16/2023 10:57 AM by Dr. Deon Wilson M.D.       CT Angiogram Head [[id number]] Collected: 08/16/23 0941     Updated: 08/16/23 1100    Narrative:      CT ANGIOGRAM OF THE HEAD AND NECK WITH CONTRAST AND CT SCAN OF THE  ORBITS WITH CONTRAST     CLINICAL HISTORY: Headache and left-sided visual changes.     TECHNIQUE: CT angiogram of the head and neck was obtained with 1 mm  axial images following the administration of IV contrast. Sagittal,  coronal, and 3-dimensional reconstructed images were obtained.  Additionally, a CT scan of the head was obtained with 3 mm axial images  before and after the administration of IV contrast.     COMPARISON: CT head dated 07/15/2016.     FINDINGS:     CTA NECK: There is a classic configuration of the aortic arch. There is  no significant great vessel origin stenosis. The vertebral arteries are  unremarkable in appearance. Mild atherosclerotic changes are identified  within the internal carotid arteries without a significant NASCET  stenosis.     CTA HEAD:   The vertebral arteries, basilar artery, and posterior  cerebral arteries are within normal limits. The internal carotid  arteries are remarkable for mild atherosclerotic calcifications within  the carotid siphons. However, these do not produce any significant  degree of luminal compromise. The middle and anterior cerebral arteries  are unremarkable in appearance.     The ventricles, sulci, and cisterns are age-appropriate.  Mild-to-moderate changes of chronic small vessel ischemic phenomena are  appreciated. The gray-white matter differentiation is within normal  limits. The basal ganglia and thalami are unremarkable in appearance.  The posterior fossa structures are within normal limits. Incidental note  is made of atherosclerotic vascular calcifications. No  abnormal foci of  contrast enhancement are noted within the brain parenchyma.       Impression:         Unremarkable CT angiographic images of the head and neck.     Mild-to-moderate changes of chronic small vessel ischemic phenomena are  incidentally noted.        Please take note that this is a preliminary result until the receipt of  the reconstructed images from [employer] at which time this report will be  finalized.     TECHNIQUE: CT scan of the orbits was obtained with 2 mm axial and  coronal soft tissue algorithm as well as 1 mm axial, coronal, and  sagittal bone algorithm images following the administration of IV  contrast.     FINDINGS:     The globes, extraocular muscles, optic nerves, retrobulbar fat, and  lacrimal glands are all unremarkable in appearance. No abnormalities are  appreciated within the visualized maxillofacial structures.     IMPRESSION:     Unremarkable CT scan of the orbits.        Radiation dose reduction techniques were utilized, including automated  exposure control and exposure modulation based on body size.        This report was finalized on 8/16/2023 10:57 AM by Dr. Deon Wilson M.D.       XR Chest 1 View [[id number]] Collected: 08/16/23 0927     Updated: 08/16/23 0931    Narrative:      XR CHEST 1 VW-     HISTORY: Female who is 67 years-old, hypertension     TECHNIQUE: Frontal view of the chest     COMPARISON: 4/22/2022     FINDINGS: Heart, mediastinum and pulmonary vasculature are unremarkable.  No focal pulmonary consolidation, pleural effusion, or pneumothorax. No  acute osseous process.       Impression:      No evidence for acute pulmonary process. Follow-up as  clinical indications persist.     This report was finalized on 8/16/2023 9:28 AM by Dr. Vijay Alarcon M.D.               I have reviewed the medications.  ---------------------------------------------------------------------------------------------  Assessment & Plan   Assessment/Problem List    Blurry  vision      Plan:    Visual disturbance  Posterior vitreous detachment- left   -CTA head and neck, CT orbits with contrast are unremarkable  -MRI orbits negative acute  -Consult ophthalmology, Dr. Irene  -Posterior vitreous detachment explains the patient's symptoms, no longer need neurology consult     Elevated blood pressure  -184/107 in ED  -Blood pressure has improved throughout day without intervention, down to 120s over 80s  -Discussed with patient, do not feel need to start blood pressure medication at this time as elevations were likely situational, she will follow-up with her primary care provider     Rheumatoid arthritis  -Continue Plaquenil     Anxiety and depression  -Continue Lexapro    Disposition: Home    Follow-up after Discharge: Ophthalmology    This note will serve as discharge summary    ROXANN Abbasi 08/16/23 15:46 EDT    I have worn appropriate PPE during this patient encounter and sanitized my hands with entering and exiting patient room.

## 2023-08-16 NOTE — ED TRIAGE NOTES
"Pt arrives ambulatory to triage for headache and left eye vision changes starting Sunday. Pt also reports a headache. Pt reports her left eye has intermittent \"cloudiness\" in her left eye vision paired with pressure. Pt denies blood thinners  "

## 2023-08-17 ENCOUNTER — TRANSITIONAL CARE MANAGEMENT TELEPHONE ENCOUNTER (OUTPATIENT)
Dept: CALL CENTER | Facility: HOSPITAL | Age: 68
End: 2023-08-17
Payer: MEDICARE

## 2023-08-17 LAB — QT INTERVAL: 426 MS

## 2023-08-17 NOTE — OUTREACH NOTE
Call Center TCM Note      Flowsheet Row Responses   [facility] patient discharged from? [place]   Does the patient have one of the following disease processes/diagnoses(primary or secondary)? Other   TCM attempt successful? Yes   Call start time 1232   Call end time 1236   Discharge diagnosis Blurry Vision   Meds reviewed with patient/caregiver? Yes   Is the patient having any side effects they believe may be caused by any medication additions or changes? No   Does the patient have all medications ordered at discharge? N/A   Is the patient taking all medications as directed (includes completed medication regime)? Yes   Comments Pt politely declined scheduling a hosp dc fu apt-would prefer to keep the OV scheduled for 8/22/23.  Will route message to group to possibly combine apts.   Does the patient have an appointment with their PCP within 7-14 days of discharge? No   Nursing Interventions Routed TCM call to PCP office, PCP office requested to make appointment - message sent   Has home health visited the patient within 72 hours of discharge? N/A   Psychosocial issues? No   Did the patient receive a copy of their discharge instructions? Yes   Nursing interventions Reviewed instructions with patient   What is the patient's perception of their health status since discharge? Improving   Is the patient/caregiver able to teach back signs and symptoms related to disease process for when to call PCP? Yes   Is the patient/caregiver able to teach back signs and symptoms related to disease process for when to call 911? Yes   Is the patient/caregiver able to teach back the hierarchy of who to call/visit for symptoms/problems? PCP, Specialist, Home health nurse, Urgent Care, ED, 911 Yes   If the patient is a current smoker, are they able to teach back resources for cessation? Not a smoker   TCM call completed? Yes   Call end time 1236            Chel Georges RN    8/17/2023, 12:37 EDT

## 2023-08-22 ENCOUNTER — OFFICE VISIT (OUTPATIENT)
Dept: INTERNAL MEDICINE | Facility: CLINIC | Age: 68
End: 2023-08-22
Payer: MEDICARE

## 2023-08-22 VITALS
HEIGHT: 60 IN | HEART RATE: 72 BPM | BODY MASS INDEX: 20.89 KG/M2 | SYSTOLIC BLOOD PRESSURE: 140 MMHG | WEIGHT: 106.4 LBS | OXYGEN SATURATION: 98 % | DIASTOLIC BLOOD PRESSURE: 72 MMHG

## 2023-08-22 DIAGNOSIS — I10 PRIMARY HYPERTENSION: Primary | Chronic | ICD-10-CM

## 2023-08-22 LAB — CREAT BLDA-MCNC: 0.6 MG/DL (ref 0.6–1.3)

## 2023-08-22 PROCEDURE — 99213 OFFICE O/P EST LOW 20 MIN: CPT | Performed by: NURSE PRACTITIONER

## 2023-08-22 PROCEDURE — 3078F DIAST BP <80 MM HG: CPT | Performed by: NURSE PRACTITIONER

## 2023-08-22 PROCEDURE — 1159F MED LIST DOCD IN RCRD: CPT | Performed by: NURSE PRACTITIONER

## 2023-08-22 PROCEDURE — 1160F RVW MEDS BY RX/DR IN RCRD: CPT | Performed by: NURSE PRACTITIONER

## 2023-08-22 PROCEDURE — 3077F SYST BP >= 140 MM HG: CPT | Performed by: NURSE PRACTITIONER

## 2023-08-22 RX ORDER — LOSARTAN POTASSIUM 25 MG/1
25 TABLET ORAL DAILY
Qty: 30 TABLET | Refills: 0 | Status: SHIPPED | OUTPATIENT
Start: 2023-08-22

## 2023-08-22 NOTE — PROGRESS NOTES
Chief Complaint  Elevated Blood Pressure     Subjective:      History of Present Illness {CC  Problem List  Visit  Diagnosis   Encounters  Notes  Medications  Labs  Result Review Imaging  Media :23}     Stacey Meyer presents to [facility] for:  hypertension     Hypertension  This is a chronic problem. The problem has been gradually worsening since onset. The problem is uncontrolled. Pertinent negatives include no chest pain, peripheral edema or shortness of breath. Agents associated with hypertension include thyroid hormones. Past treatments include diuretics and angiotensin blockers (stopped in 2020).      Patient declined ER follow up appt from 8/16/23  posterior vitreous detachment   Discharge Summary by Janiya Montero PA (08/16/2023 3:46 PM)   Follow up with ophthalmology       's knee surgery recovergy going well.   Working with new teacher: stressful     Answers submitted by the patient for this visit:  Other (Submitted on 8/20/2023)  Please describe your symptoms.: high blood pressure  Have you had these symptoms before?: Yes  How long have you been having these symptoms?: 5-7 days  Primary Reason for Visit (Submitted on 8/20/2023)  What is the primary reason for your visit?: Other      I have reviewed patient's medical history, any new submitted information provided by patient or medical assistant and updated medical record.      Objective:      Physical Exam  Cardiovascular:      Rate and Rhythm: Normal rate and regular rhythm.      Pulses: Normal pulses.      Heart sounds: Normal heart sounds.   Pulmonary:      Effort: Pulmonary effort is normal.      Breath sounds: Normal breath sounds.   Musculoskeletal:      Right lower leg: No edema.      Left lower leg: No edema.      Result Review  Data Reviewed:{ Labs  Result Review  Imaging  Med Tab  Media :23}     The following data was reviewed by: Kayli Tucker III, NP-C on  "08/22/2023  Common labs          10/7/2022    13:30 7/26/2023    11:03 8/16/2023    08:14 8/16/2023    08:33   Common Labs   Glucose  90  101     BUN  17  15     Creatinine  0.70  0.65  0.60    Sodium  140  140     Potassium  4.8  4.4     Chloride  101  98     Calcium  10.4  10.4     Total Protein  6.1      Albumin  4.6  4.9     Total Bilirubin  0.6  1.4     Alkaline Phosphatase  65  70     AST (SGOT)  22  31     ALT (SGPT)  19  22     WBC 5.80     4.88  4.50     Hemoglobin 12.8     13.7  14.5     Hematocrit 39.5     40.0  43.7     Platelets 321     321  397     Total Cholesterol   254     Total Cholesterol  208      Triglycerides  80  83     HDL Cholesterol  64  88     LDL Cholesterol   130  152     Hemoglobin A1C   5.20        Details          This result is from an external source.                    Vital Signs:   /72 (BP Location: Left arm, Patient Position: Sitting, Cuff Size: Adult)   Pulse 72   Ht 152.4 cm (60\")   Wt 48.3 kg (106 lb 6.4 oz)   SpO2 98%   BMI 20.78 kg/mý         Requested Prescriptions     Signed Prescriptions Disp Refills    losartan (Cozaar) 25 MG tablet 30 tablet 0     Sig: Take 1 tablet by mouth Daily.       Routine medications provided by this office will also be refilled via pharmacy request.       Current Outpatient Medications:     escitalopram (Lexapro) 10 MG tablet, Take 1 tablet by mouth Daily., Disp: 90 tablet, Rfl: 1    hydroxychloroquine (PLAQUENIL) 200 MG tablet, Take 1 tablet by mouth Every Other Day., Disp: , Rfl:     Ibuprofen 3 %, Gabapentin 10 %, Baclofen 2 %, lidocaine 4 %, Apply 1-2 g topically to the appropriate area as directed 3 (Three) to 4 (Four) times daily., Disp: 90 g, Rfl: 1    levothyroxine (SYNTHROID, LEVOTHROID) 112 MCG tablet, TAKE 1 TABLET BY MOUTH EVERY DAY (Patient taking differently: Take 1 tablet by mouth Daily.), Disp: 90 tablet, Rfl: 1    losartan (Cozaar) 25 MG tablet, Take 1 tablet by mouth Daily., Disp: 30 tablet, Rfl: 0     Assessment " and Plan:      Assessment and Plan {CC Problem List  Visit Diagnosis  ROS  Review (Popup)  Health Maintenance  Quality  BestPractice  Medications  SmartSets  SnapShot Encounters  Media :23}     Problem List Items Addressed This Visit          Cardiac and Vasculature    Hypertension - Primary (Chronic)    Overview     Prior treatment:  Avalide, losartan          Current Assessment & Plan     Hypertension is worsening.  Medication changes per orders.  Blood pressure will be reassessed in 2 weeks.    Restart losartan 25 mg daily.  If systolic blood pressure not consistently below,  130 she will increase to 2 tabs daily: 50 mg daily             Relevant Medications    losartan (Cozaar) 25 MG tablet       Follow Up {Instructions Charge Capture  Follow-up Communications :23}     Return in about 16 days (around 9/7/2023).      Patient was given instructions and counseling regarding her condition or for health maintenance advice. Please see specific information pulled into the AVS if appropriate.    [place] disclaimer:   Much of this encounter note is an electronic transcription/translation of spoken language to printed text. The electronic translation of spoken language may permit erroneous, or at times, nonsensical words or phrases to be inadvertently transcribed; Although I have reviewed the note for such errors, some may still exist.     Additional Patient Counseling:       There are no Patient Instructions on file for this visit.

## 2023-08-22 NOTE — ASSESSMENT & PLAN NOTE
Hypertension is worsening.  Medication changes per orders.  Blood pressure will be reassessed in 2 weeks.    Restart losartan 25 mg daily.  If systolic blood pressure not consistently below,  130 she will increase to 2 tabs daily: 50 mg daily

## 2023-08-29 ENCOUNTER — HOSPITAL ENCOUNTER (OUTPATIENT)
Dept: GENERAL RADIOLOGY | Facility: HOSPITAL | Age: 68
Discharge: HOME | End: 2023-08-29
Payer: MEDICARE

## 2023-08-29 DIAGNOSIS — M19.071 ARTHRITIS OF FIRST METATARSOPHALANGEAL (MTP) JOINT OF RIGHT FOOT: ICD-10-CM

## 2023-08-29 PROCEDURE — 0 LIDOCAINE 1 % SOLUTION: Performed by: ORTHOPAEDIC SURGERY

## 2023-08-29 PROCEDURE — 25510000001 IOPAMIDOL 61 % SOLUTION: Performed by: ORTHOPAEDIC SURGERY

## 2023-08-29 PROCEDURE — 77002 NEEDLE LOCALIZATION BY XRAY: CPT

## 2023-08-29 PROCEDURE — 25010000002 BUPIVACAINE (PF) 0.25 % SOLUTION: Performed by: ORTHOPAEDIC SURGERY

## 2023-08-29 PROCEDURE — 25010000002 METHYLPREDNISOLONE PER 40 MG: Performed by: ORTHOPAEDIC SURGERY

## 2023-08-29 RX ORDER — BUPIVACAINE HYDROCHLORIDE 2.5 MG/ML
10 INJECTION, SOLUTION EPIDURAL; INFILTRATION; INTRACAUDAL ONCE
Status: COMPLETED | OUTPATIENT
Start: 2023-08-29 | End: 2023-08-29

## 2023-08-29 RX ORDER — METHYLPREDNISOLONE SODIUM SUCCINATE 40 MG/ML
40 INJECTION, POWDER, LYOPHILIZED, FOR SOLUTION INTRAMUSCULAR; INTRAVENOUS
Status: COMPLETED | OUTPATIENT
Start: 2023-08-29 | End: 2023-08-29

## 2023-08-29 RX ORDER — LIDOCAINE HYDROCHLORIDE 10 MG/ML
20 INJECTION, SOLUTION INFILTRATION; PERINEURAL ONCE
Status: COMPLETED | OUTPATIENT
Start: 2023-08-29 | End: 2023-08-29

## 2023-08-29 RX ADMIN — METHYLPREDNISOLONE SODIUM SUCCINATE 40 MG: 40 INJECTION, POWDER, LYOPHILIZED, FOR SOLUTION INTRAMUSCULAR; INTRAVENOUS at 11:50

## 2023-08-29 RX ADMIN — LIDOCAINE HYDROCHLORIDE 1 ML: 10 INJECTION, SOLUTION INFILTRATION; PERINEURAL at 11:50

## 2023-08-29 RX ADMIN — IOPAMIDOL 0.5 ML: 612 INJECTION, SOLUTION INTRAVENOUS at 11:50

## 2023-08-29 RX ADMIN — BUPIVACAINE HYDROCHLORIDE 1 ML: 2.5 INJECTION, SOLUTION EPIDURAL; INFILTRATION; INTRACAUDAL; PERINEURAL at 11:50

## 2023-09-08 ENCOUNTER — OFFICE VISIT (OUTPATIENT)
Dept: INTERNAL MEDICINE | Facility: CLINIC | Age: 68
End: 2023-09-08
Payer: MEDICARE

## 2023-09-08 VITALS
DIASTOLIC BLOOD PRESSURE: 92 MMHG | HEIGHT: 60 IN | WEIGHT: 107.4 LBS | SYSTOLIC BLOOD PRESSURE: 150 MMHG | BODY MASS INDEX: 21.09 KG/M2

## 2023-09-08 DIAGNOSIS — I10 PRIMARY HYPERTENSION: Chronic | ICD-10-CM

## 2023-09-08 DIAGNOSIS — F41.8 DEPRESSION WITH ANXIETY: Primary | Chronic | ICD-10-CM

## 2023-09-08 PROCEDURE — 99214 OFFICE O/P EST MOD 30 MIN: CPT | Performed by: NURSE PRACTITIONER

## 2023-09-08 PROCEDURE — 1160F RVW MEDS BY RX/DR IN RCRD: CPT | Performed by: NURSE PRACTITIONER

## 2023-09-08 PROCEDURE — 3080F DIAST BP >= 90 MM HG: CPT | Performed by: NURSE PRACTITIONER

## 2023-09-08 PROCEDURE — 1159F MED LIST DOCD IN RCRD: CPT | Performed by: NURSE PRACTITIONER

## 2023-09-08 PROCEDURE — 3077F SYST BP >= 140 MM HG: CPT | Performed by: NURSE PRACTITIONER

## 2023-09-08 RX ORDER — BUSPIRONE HYDROCHLORIDE 5 MG/1
5 TABLET ORAL 3 TIMES DAILY PRN
Qty: 90 TABLET | Refills: 1 | Status: SHIPPED | OUTPATIENT
Start: 2023-09-08

## 2023-09-08 NOTE — PROGRESS NOTES
"        Chief Complaint  Hypertension     Subjective:      History of Present Illness {CC  Problem List  Visit  Diagnosis   Encounters  Notes  Medications  Labs  Result Review Imaging  Media :23}     Stacey Meyer presents to [facility] for:        Hypertension  This is a chronic problem. The problem has been gradually worsening since onset. The problem is uncontrolled. Pertinent negatives include no chest pain, peripheral edema or shortness of breath. Agents associated with hypertension include thyroid hormones. Past treatments include diuretics and angiotensin blockers (stopped in 2020).    LV:  restarted losartan 25 mg daily   States no further issues with her vision.       She has quit her job that was causing her stress.  Will start new position on Monday. Still some anxiety.       I have reviewed patient's medical history, any new submitted information provided by patient or medical assistant and updated medical record.      Objective:      Physical Exam  Cardiovascular:      Rate and Rhythm: Normal rate and regular rhythm.      Pulses: Normal pulses.      Heart sounds: Normal heart sounds.   Musculoskeletal:      Right lower leg: No edema.      Left lower leg: No edema.   Neurological:      Mental Status: She is oriented to person, place, and time.      Result Review  Data Reviewed:{ Labs  Result Review  Imaging  Med Tab  Media :23}                Vital Signs:   /92 (BP Location: Left arm, Patient Position: Sitting, Cuff Size: Adult)   Ht 152.4 cm (60\")   Wt 48.7 kg (107 lb 6.4 oz)   BMI 20.98 kg/m²         Requested Prescriptions     Signed Prescriptions Disp Refills    busPIRone (BUSPAR) 5 MG tablet 90 tablet 1     Sig: Take 1 tablet by mouth 3 (Three) Times a Day As Needed (ANXIETY).       Routine medications provided by this office will also be refilled via pharmacy request.       Current Outpatient Medications:     escitalopram (Lexapro) 10 MG " tablet, Take 1 tablet by mouth Daily., Disp: 90 tablet, Rfl: 1    hydroxychloroquine (PLAQUENIL) 200 MG tablet, Take 1 tablet by mouth Every Other Day., Disp: , Rfl:     Ibuprofen 3 %, Gabapentin 10 %, Baclofen 2 %, lidocaine 4 %, Apply 1-2 g topically to the appropriate area as directed 3 (Three) to 4 (Four) times daily., Disp: 90 g, Rfl: 1    levothyroxine (SYNTHROID, LEVOTHROID) 112 MCG tablet, TAKE 1 TABLET BY MOUTH EVERY DAY (Patient taking differently: Take 1 tablet by mouth Daily.), Disp: 90 tablet, Rfl: 1    losartan (Cozaar) 25 MG tablet, Take 1 tablet by mouth Daily., Disp: 30 tablet, Rfl: 0    busPIRone (BUSPAR) 5 MG tablet, Take 1 tablet by mouth 3 (Three) Times a Day As Needed (ANXIETY)., Disp: 90 tablet, Rfl: 1     Assessment and Plan:      Assessment and Plan {CC Problem List  Visit Diagnosis  ROS  Review (Popup)  Health Maintenance  Quality  BestPractice  Medications  SmartSets  SnapShot Encounters  Media :23}     Problem List Items Addressed This Visit          Cardiac and Vasculature    Hypertension (Chronic)    Overview     Prior treatment:  Avalide, losartan   Current treatment:  losartan          Current Assessment & Plan     Hypertension is improving with treatment.  Medication changes per orders.  Blood pressure will be reassessed at the next regular appointment.  Increase losartan to 50 mg daily.   She will monitor SBP at home: if not consistently below 130 - she will let me know and will increase dose.             Mental Health    Depression with anxiety - Primary (Chronic)    Current Assessment & Plan     Improving.     Will add busar up to 3 times a day as needed.   I discussed medication use, dosing and possible side effects.   Patient was given opportunity to ask any questions.           Relevant Medications    busPIRone (BUSPAR) 5 MG tablet       Follow Up {Instructions Charge Capture  Follow-up Communications :23}     Return for Next scheduled follow up.      Patient was  given instructions and counseling regarding her condition or for health maintenance advice. Please see specific information pulled into the AVS if appropriate.    [place] disclaimer:   Much of this encounter note is an electronic transcription/translation of spoken language to printed text. The electronic translation of spoken language may permit erroneous, or at times, nonsensical words or phrases to be inadvertently transcribed; Although I have reviewed the note for such errors, some may still exist.     Additional Patient Counseling:       There are no Patient Instructions on file for this visit.

## 2023-09-11 NOTE — ASSESSMENT & PLAN NOTE
Hypertension is improving with treatment.  Medication changes per orders.  Blood pressure will be reassessed at the next regular appointment.  Increase losartan to 50 mg daily.   She will monitor SBP at home: if not consistently below 130 - she will let me know and will increase dose.

## 2023-09-11 NOTE — ASSESSMENT & PLAN NOTE
Improving.     Will add busar up to 3 times a day as needed.   I discussed medication use, dosing and possible side effects.   Patient was given opportunity to ask any questions.

## 2023-09-14 DIAGNOSIS — I10 PRIMARY HYPERTENSION: Chronic | ICD-10-CM

## 2023-09-15 RX ORDER — LOSARTAN POTASSIUM 25 MG/1
TABLET ORAL
Qty: 30 TABLET | Refills: 0 | Status: SHIPPED | OUTPATIENT
Start: 2023-09-15

## 2023-10-06 NOTE — PROGRESS NOTES
New Shoulder      Patient: Stacey Meyer        YOB: 1955    Medical Record Number: [id number]        Chief Complaints: Right shoulder pain      History of Present Illness: This is a 68-year-old female who presents complaining of right shoulder pain I last saw her last July for the left shoulder she states the left 1 is doing well the right 1 is now bothering her she is done some of the physical therapy on it really has not helped      Allergies:   Allergies   Allergen Reactions    Erythromycin Rash    Metronidazole Rash and Swelling    Oxycodone-Aspirin Nausea And Vomiting and Nausea Only    Sulfa Antibiotics Rash    Tetracycline Rash    Tetracyclines & Related Rash       Medications:   Home Medications:  Current Outpatient Medications on File Prior to Visit   Medication Sig    escitalopram (Lexapro) 10 MG tablet Take 1 tablet by mouth Daily.    hydroxychloroquine (PLAQUENIL) 200 MG tablet Take 1 tablet by mouth Every Other Day.    [DISCONTINUED] levothyroxine (SYNTHROID, LEVOTHROID) 112 MCG tablet TAKE 1 TABLET BY MOUTH EVERY DAY (Patient taking differently: Take 1 tablet by mouth Daily.)    [DISCONTINUED] losartan (COZAAR) 25 MG tablet TAKE 1 TABLET BY MOUTH EVERY DAY    busPIRone (BUSPAR) 5 MG tablet Take 1 tablet by mouth 3 (Three) Times a Day As Needed (ANXIETY). (Patient not taking: Reported on 10/9/2023)    Ibuprofen 3 %, Gabapentin 10 %, Baclofen 2 %, lidocaine 4 % Apply 1-2 g topically to the appropriate area as directed 3 (Three) to 4 (Four) times daily. (Patient not taking: Reported on 10/9/2023)    levothyroxine (SYNTHROID, LEVOTHROID) 112 MCG tablet TAKE 1 TABLET BY MOUTH EVERY DAY     No current facility-administered medications on file prior to visit.     Current Medications:  Scheduled Meds:  Continuous Infusions:No current facility-administered medications for this visit.    PRN Meds:.    Past Medical History:   Diagnosis Date    Allergic ?    percadan sulfa emycin tetriccyclin  "flagel    Anxiety     Closed nondisplaced fracture of fifth right metatarsal bone 07/27/2023    Degeneration, intervertebral disc, thoracic     Depression     Depression 06/04/2016    Erosive osteoarthritis     Dr Galeano    History of peptic ulcer     History of transfusion     AFTER HIP REPLACEMENT     Hypertension     Hypothyroidism many years    Knee swelling 09/2009    OA (osteoarthritis) of hip     Osteoarthritis     PONV (postoperative nausea and vomiting)     Primary osteoarthritis of right hip     Rheumatoid arthritis     Thyroid disease     HYPOTHYROID    Visual impairment readers        Past Surgical History:   Procedure Laterality Date    BILATERAL BREAST REDUCTION Bilateral 11/14/2018    Procedure: BREAST REDUCTION BILATERAL, NEW IMAGE;  Surgeon: Myranda Perea MD;  Location: [facility] OR;  Service: New Image    BREAST SURGERY  2018    BUNIONECTOMY Left     COLONOSCOPY      2009    COLONOSCOPY N/A 06/26/2019    Procedure: COLONOSCOPY WITH COLD POLYPECTOMY x 2;  Surgeon: Danny Costa MD;  Location: [facility] ENDOSCOPY;  Service: Gastroenterology    ECTOPIC PREGNANCY SURGERY Bilateral     2 SEPARATE SURGERIES    ENDOSCOPY N/A 05/24/2016    Procedure: ESOPHAGOGASTRODUODENOSCOPY WITH COLD BIOPSIES;  Surgeon: Danny Costa MD;  Location: [facility] ENDOSCOPY;  Service:     ENDOSCOPY N/A 11/01/2016    Procedure: ESOPHAGOGASTRODUODENOSCOPY;  Surgeon: Danny Costa MD;  Location: [facility] ENDOSCOPY;  Service:     FERTILITY SURGERY      FOOT SURGERY Bilateral     \"THEY BROKE MY 5TH TOE ON BOTH SIDES AND STRIGHTENED THEM\"     HAMMER TOE REPAIR Bilateral     HAND SURGERY  04/28/2021    left hand     JOINT REPLACEMENT Right 07/15/2020    hand    TONSILLECTOMY      TOTAL HIP ARTHROPLASTY Right 07/13/2016    Procedure: RIGHT TOTAL HIP ARTHROPLASTY ANTERIOR APPROACH;  Surgeon: Eric Clayton MD;  Location: [facility] OR;  Service:         Social History     Occupational History    Occupation: teacher " "  Tobacco Use    Smoking status: Former     Packs/day: 1.00     Years: 10.00     Additional pack years: 0.00     Total pack years: 10.00     Types: Cigarettes     Quit date: 1986     Years since quittin.7    Smokeless tobacco: Never   Vaping Use    Vaping Use: Never used   Substance and Sexual Activity    Alcohol use: Not Currently     Comment: occasional etoh    Drug use: No    Sexual activity: Not Currently     Partners: Male     Birth control/protection: None      Social History     Social History Narrative    Not on file        Family History   Problem Relation Age of Onset    Liver disease Mother          at 69 yoa     Autoimmune disease Mother     Stroke Maternal Aunt     Obesity Brother     Malig Hyperthermia Neg Hx              Review of Systems:     Review of Systems      Physical Exam: 68 y.o. female  General Appearance:    Alert, cooperative, in no acute distress                   Vitals:    10/09/23 0844   Temp: 98.4 øF (36.9 øC)   Weight: 48.7 kg (107 lb 6.4 oz)   Height: 152.4 cm (60\")   PainSc:   5      Patient is alert and read x3 no acute distress appears her above-listed at height weight and age.  Affect is normal respiratory rate is normal unlabored. Heart rate regular rate rhythm, sclera, dentition and hearing are normal for the purpose of this exam.    Ortho Exam  Physical exam of the right shoulder reveals no overlying skin changes no lymphedema no lymphadenopathy.  Patient has active flexion 180 with mild symptoms abduction is similar external rotation is to 50 and internal rotation to the upper lumbar spine with mild symptoms.  Patient has good rotator cuff strength 4+ over 5 with isometric strength testing with pain.  Patient has a positive impingement and a positive Qureshi sign.  Patient has good cervical range of motion which is full and asymptomatic no radicular symptoms.  Patient has a normal elbow exam.  Good distal pulses are present  Patient has pain with overhead " activity and a positive Neer sign and a positive empty can sign , a positive drop arm and a definitive painful arc   Large Joint Arthrocentesis: R subacromial bursa  Date/Time: 10/9/2023 9:03 AM  Consent given by: patient  Site marked: site marked  Timeout: Immediately prior to procedure a time out was called to verify the correct patient, procedure, equipment, support staff and site/side marked as required   Supporting Documentation  Indications: pain   Procedure Details  Location: shoulder - R subacromial bursa  Preparation: Patient was prepped and draped in the usual sterile fashion  Needle gauge: 21G.  Approach: posterior  Medications administered: 80 mg methylPREDNISolone acetate 80 MG/ML; 2 mL lidocaine PF 1% 1 %  Patient tolerance: patient tolerated the procedure well with no immediate complications              Radiology:   AP, Scapular Y and Axillary Lateral of the right shoulder were ordered/reviewed to evauate shoulder pain.  I have no other comparative films these are normal she has some acromioclavicular arthritis otherwise no acute pathology  Imaging Results (Most Recent)       Procedure Component Value Units Date/Time    XR Shoulder 2+ View Right [[id number]] Resulted: 10/09/23 0839     Updated: 10/09/23 0839    Impression:      Ordering physician's impression is located in the Encounter Note dated 10/09/23. X-ray performed in the DR room.            Assessment/Plan: Right shoulder pain I think this is rotator cuff is very similar to the left the left 1 is doing well now plan is to proceed with an injection as a diagnostic and therapeutic tool she get back on her exercises should she fail to improve with this we will pursue other means of testing  Cortisone Injection. See procedure note.  Cortisone Injection for DIAGNOSTIC and THERAPUTIC purposes.  Answers submitted by the patient for this visit:  Other (Submitted on 10/7/2023)  Please describe your symptoms.: Pain in right shoulder  Have you had these  symptoms before?: Yes  How long have you been having these symptoms?: Greater than 2 weeks  Please describe any probable cause for these symptoms. : not sure  Primary Reason for Visit (Submitted on 10/7/2023)  What is the primary reason for your visit?: Other

## 2023-10-09 ENCOUNTER — OFFICE VISIT (OUTPATIENT)
Dept: ORTHOPEDIC SURGERY | Facility: CLINIC | Age: 68
End: 2023-10-09
Payer: MEDICARE

## 2023-10-09 VITALS — TEMPERATURE: 98.4 F | BODY MASS INDEX: 21.09 KG/M2 | WEIGHT: 107.4 LBS | HEIGHT: 60 IN

## 2023-10-09 DIAGNOSIS — M75.41 IMPINGEMENT SYNDROME OF RIGHT SHOULDER: ICD-10-CM

## 2023-10-09 DIAGNOSIS — R52 PAIN: Primary | ICD-10-CM

## 2023-10-09 DIAGNOSIS — I10 PRIMARY HYPERTENSION: Chronic | ICD-10-CM

## 2023-10-09 PROCEDURE — 1160F RVW MEDS BY RX/DR IN RCRD: CPT | Performed by: ORTHOPAEDIC SURGERY

## 2023-10-09 PROCEDURE — 20610 DRAIN/INJ JOINT/BURSA W/O US: CPT | Performed by: ORTHOPAEDIC SURGERY

## 2023-10-09 PROCEDURE — 1159F MED LIST DOCD IN RCRD: CPT | Performed by: ORTHOPAEDIC SURGERY

## 2023-10-09 PROCEDURE — 99213 OFFICE O/P EST LOW 20 MIN: CPT | Performed by: ORTHOPAEDIC SURGERY

## 2023-10-09 PROCEDURE — 73030 X-RAY EXAM OF SHOULDER: CPT | Performed by: ORTHOPAEDIC SURGERY

## 2023-10-09 RX ORDER — LOSARTAN POTASSIUM 25 MG/1
TABLET ORAL
Qty: 30 TABLET | Refills: 0 | Status: SHIPPED | OUTPATIENT
Start: 2023-10-09 | End: 2023-10-10 | Stop reason: SDUPTHER

## 2023-10-09 RX ORDER — LIDOCAINE HYDROCHLORIDE 10 MG/ML
2 INJECTION, SOLUTION EPIDURAL; INFILTRATION; INTRACAUDAL; PERINEURAL
Status: COMPLETED | OUTPATIENT
Start: 2023-10-09 | End: 2023-10-09

## 2023-10-09 RX ORDER — LEVOTHYROXINE SODIUM 112 UG/1
TABLET ORAL
Qty: 90 TABLET | Refills: 1 | Status: SHIPPED | OUTPATIENT
Start: 2023-10-09

## 2023-10-09 RX ORDER — METHYLPREDNISOLONE ACETATE 80 MG/ML
80 INJECTION, SUSPENSION INTRA-ARTICULAR; INTRALESIONAL; INTRAMUSCULAR; SOFT TISSUE
Status: COMPLETED | OUTPATIENT
Start: 2023-10-09 | End: 2023-10-09

## 2023-10-09 RX ADMIN — METHYLPREDNISOLONE ACETATE 80 MG: 80 INJECTION, SUSPENSION INTRA-ARTICULAR; INTRALESIONAL; INTRAMUSCULAR; SOFT TISSUE at 09:03

## 2023-10-09 RX ADMIN — LIDOCAINE HYDROCHLORIDE 2 ML: 10 INJECTION, SOLUTION EPIDURAL; INFILTRATION; INTRACAUDAL; PERINEURAL at 09:03

## 2023-10-10 DIAGNOSIS — I10 PRIMARY HYPERTENSION: Chronic | ICD-10-CM

## 2023-10-10 RX ORDER — LOSARTAN POTASSIUM 25 MG/1
25 TABLET ORAL DAILY
Qty: 30 TABLET | Refills: 2 | Status: SHIPPED | OUTPATIENT
Start: 2023-10-10

## 2023-10-10 NOTE — TELEPHONE ENCOUNTER
Rx Refill Note  Requested Prescriptions     Pending Prescriptions Disp Refills    losartan (COZAAR) 25 MG tablet 30 tablet 2     Sig: Take 1 tablet by mouth Daily.      Last office visit with prescribing clinician: 9/8/2023   Last telemedicine visit with prescribing clinician: Visit date not found   Next office visit with prescribing clinician: 1/30/2024         Maya Mora MA  10/10/23, 08:49 EDT      Pt sent myhomemove message asking for more refills for her losartan

## 2023-10-16 ENCOUNTER — OFFICE VISIT (OUTPATIENT)
Dept: ORTHOPEDIC SURGERY | Facility: CLINIC | Age: 68
End: 2023-10-16
Payer: MEDICARE

## 2023-10-16 VITALS — WEIGHT: 107 LBS | BODY MASS INDEX: 21.01 KG/M2 | HEIGHT: 60 IN | TEMPERATURE: 98.6 F

## 2023-10-16 DIAGNOSIS — R52 PAIN: Primary | ICD-10-CM

## 2023-10-16 DIAGNOSIS — S92.354S CLOSED NONDISPLACED FRACTURE OF FIFTH METATARSAL BONE OF RIGHT FOOT, SEQUELA: ICD-10-CM

## 2023-10-16 DIAGNOSIS — M19.071 ARTHRITIS OF FIRST METATARSOPHALANGEAL (MTP) JOINT OF RIGHT FOOT: ICD-10-CM

## 2023-10-16 NOTE — PROGRESS NOTES
"Foot Follow Up      Patient: Satcey Meyer    YOB: 1955 68 y.o. female    Chief Complaints: Foot feels much better    History of Present Illness: Patient was seen initially on 7/27/2023 with reporting history of rheumatoid arthritis and reported a history of chronic pain in her right first MTP joint that shoots into her toe.  She had been followed by Dr. Harvey and had an injection done in the spring 2023 which gave her some initial relief with recurrence of symptoms.  She reported that she had some type of previous hammertoe procedures on the fourth and fifth also thought that she had an old fifth metatarsal fracture.  She had left bunion corrected many years ago.    She was felt to have arthritis of the first MTP joint with hallux rigidus as well as previous fourth and fifth hammertoe procedures and previous right fifth metatarsal fracture with asymptomatic shortening of the fifth ray.  Main thing that was bothering her was her first MTP joint.  We discussed hierarchy of treatment reviewed she may ultimately require fusion which she did not wish to proceed with and her  just had knee replacement by Dr. Clayton and her son-in-law was awaiting cardiac transplant.  We discussed nonoperative options and she was prescribed compounding cream and instructed on heel cord stretching exercises for the carbon fiber insert and sent for radiographic guided injection of the right first MTP joint.  Patient had radiographically guided injection of the right first MTP joint on 8/29/2023.  She is seen back today stating that she remains much better and that the injection was \"a game changer \"and has made a \"night and day\" difference..  She was even actually able to wear high heels at a wedding.  She does not feel that the topical helped much.  Current pain is rated 0 out of 10  HPI    ROS: Foot pain  Past Medical History:   Diagnosis Date    Allergic ?    percadan sulfa emycin tetriccyclin flagel    Anxiety     " "Closed nondisplaced fracture of fifth right metatarsal bone 07/27/2023    Degeneration, intervertebral disc, thoracic     Depression     Depression 06/04/2016    Erosive osteoarthritis     Dr Galeano    History of peptic ulcer     History of transfusion     AFTER HIP REPLACEMENT     Hypertension     Hypothyroidism many years    Knee swelling 09/2009    OA (osteoarthritis) of hip     Osteoarthritis     PONV (postoperative nausea and vomiting)     Primary osteoarthritis of right hip     Rheumatoid arthritis     Thyroid disease     HYPOTHYROID    Visual impairment readers     Physical Exam:   Vitals:    10/16/23 0931   Temp: 98.6 °F (37 °C)   TempSrc: Temporal   Weight: 48.5 kg (107 lb)   Height: 152.4 cm (60\")   PainSc: 0-No pain   PainLoc: Foot     Well developed with normal mood.  Exam right foot shows mild hallux valgus deformity with palpable arthritic change.  She had 35 degrees dorsiflexion 15 degrees plantarflexion with some crepitus but without appreciable pain.      Radiology: 3 views of the right foot ordered evaluate alignment reviewed and compared to previous x-rays.  There remains significant arthritic change of the right first metatarsal phalangeal joint without change compared to previous x-ray as well as evidence of previous fifth metatarsal fracture appears healed with some altered morphology of the fifth metatarsal.      Assessment/Plan:  1.  Right first MTP arthritis with hallux rigidus  2.  Previous right fifth metatarsal fracture or possible osteotomy and fourth and fifth hammertoe procedures done elsewhere with asymptomatic shortening of the fifth ray    We discussed treatment going forward and she seems to be doing well with injections as well as other nonoperative measures including stretching carbon fiber insert etc.    We will have her continue doing as such and may use compounding cream if needed although did not seem to have much relief with that    At this point she does not desire " fusion but that is an option if she has symptoms of become refractory to injection which could be done every 4 months or so.  She understands this injection will eventually wear off.  When she has recurrent persistent pain she will let us know and we can see about doing another injection    We had a pleasant visit and by mutual agreement I will see her back as needed

## 2023-11-22 ENCOUNTER — TELEPHONE (OUTPATIENT)
Dept: ORTHOPEDIC SURGERY | Facility: CLINIC | Age: 68
End: 2023-11-22
Payer: MEDICARE

## 2023-11-22 DIAGNOSIS — M19.071 ARTHRITIS OF FIRST METATARSOPHALANGEAL (MTP) JOINT OF RIGHT FOOT: Primary | ICD-10-CM

## 2023-11-22 NOTE — TELEPHONE ENCOUNTER
I returned patient's call.  She was asking about repeat injection of her first MTP joint.  Says she is having some recurrent symptoms but not as bad as it had been previously but with upcoming holidays etc. want to go ahead and see about getting this done.  She said it feels the same as it did previously and has not had any new injury or other change in symptoms    We will send her for repeat radiographic guided injection of the right first MTP joint and she will continue with other modalities as far as use of inserts compounding cream stretching etc.    We had a pleasant conversation and she understands that if she has recurrent symptoms we can obtain repeat injections and if becomes refractory to such that would require fusion but would need to see her in the office again to check x-rays prior to further injections.

## 2023-11-22 NOTE — TELEPHONE ENCOUNTER
Caller: ZAN BABIN     Relationship: PATIENT     Best call back number: [phone] OKAY TO LVM     What orders are you requesting (i.e. lab or imaging): FL GUIDE PAIN INJECTION RIGHT FOOT     In what timeframe would the patient need to come in: ASAP     Where will you receive your lab/imaging services: [facility]     Additional notes: 08/29/23 LAST PROCEDURE INJECTION.  PATIENT STATES DR ALBARADO SAID WHEN SHE IS READY FOR ANOTHER CONTACT OFFICE , PATIENT IS READY.

## 2023-11-28 ENCOUNTER — HOSPITAL ENCOUNTER (OUTPATIENT)
Dept: PHYSICAL THERAPY | Facility: HOSPITAL | Age: 68
Discharge: HOME | End: 2023-11-28
Admitting: ORTHOPAEDIC SURGERY
Payer: MEDICARE

## 2023-11-28 DIAGNOSIS — Z74.09 IMPAIRED MOBILITY: ICD-10-CM

## 2023-11-28 DIAGNOSIS — M25.511 RIGHT SHOULDER PAIN, UNSPECIFIED CHRONICITY: Primary | ICD-10-CM

## 2023-11-28 PROCEDURE — 97110 THERAPEUTIC EXERCISES: CPT | Performed by: PHYSICAL THERAPIST

## 2023-11-28 PROCEDURE — 97161 PT EVAL LOW COMPLEX 20 MIN: CPT | Performed by: PHYSICAL THERAPIST

## 2023-11-28 NOTE — THERAPY EVALUATION
Outpatient Physical Therapy Ortho Initial Evaluation  [facility]     Patient Name: Stacey Meyer  : 1955  MRN: [id number]  Today's Date: 2023      Visit Date: 2023    Patient Active Problem List   Diagnosis    Degeneration of lumbar intervertebral disc    Degeneration, intervertebral disc, thoracic    Fibromyalgia    Transient insomnia    Primary osteoarthritis of right hip    Familial ligamentous laxity    Hypertension    OA (osteoarthritis) of hip    Acquired hypothyroidism    Rheumatoid arthritis of multiple sites with negative rheumatoid factor    Gigantomastia    Erosive osteoarthritis    Depression with anxiety    Mixed hyperlipidemia    Arthritis of foot    Closed nondisplaced fracture of fifth right metatarsal bone    Arthritis of first metatarsophalangeal (MTP) joint of right foot    Blurry vision        Past Medical History:   Diagnosis Date    Allergic ?    percadan sulfa emycin tetriccyclin flagel    Anxiety     Closed nondisplaced fracture of fifth right metatarsal bone 2023    Degeneration, intervertebral disc, thoracic     Depression     Depression 2016    Erosive osteoarthritis     Dr Galeano    History of peptic ulcer     History of transfusion     AFTER HIP REPLACEMENT     Hypertension     Hypothyroidism many years    Knee swelling 2009    OA (osteoarthritis) of hip     Osteoarthritis     PONV (postoperative nausea and vomiting)     Primary osteoarthritis of right hip     Rheumatoid arthritis     Thyroid disease     HYPOTHYROID    Visual impairment readers        Past Surgical History:   Procedure Laterality Date    BILATERAL BREAST REDUCTION Bilateral 2018    Procedure: BREAST REDUCTION BILATERAL, NEW IMAGE;  Surgeon: Myranda Perea MD;  Location: [facility] OR;  Service: New Image    BREAST SURGERY  2018    BUNIONECTOMY Left     COLONOSCOPY      2009    COLONOSCOPY N/A 2019    Procedure: COLONOSCOPY WITH COLD POLYPECTOMY x 2;   "Surgeon: Danny Costa MD;  Location: [facility] ENDOSCOPY;  Service: Gastroenterology    ECTOPIC PREGNANCY SURGERY Bilateral     2 SEPARATE SURGERIES    ENDOSCOPY N/A 05/24/2016    Procedure: ESOPHAGOGASTRODUODENOSCOPY WITH COLD BIOPSIES;  Surgeon: Danny Costa MD;  Location: [facility] ENDOSCOPY;  Service:     ENDOSCOPY N/A 11/01/2016    Procedure: ESOPHAGOGASTRODUODENOSCOPY;  Surgeon: Danny Costa MD;  Location: [facility] ENDOSCOPY;  Service:     FERTILITY SURGERY      FOOT SURGERY Bilateral     \"THEY BROKE MY 5TH TOE ON BOTH SIDES AND STRIGHTENED THEM\"     HAMMER TOE REPAIR Bilateral     HAND SURGERY  04/28/2021    left hand     JOINT REPLACEMENT Right 07/15/2020    hand    TONSILLECTOMY      TOTAL HIP ARTHROPLASTY Right 07/13/2016    Procedure: RIGHT TOTAL HIP ARTHROPLASTY ANTERIOR APPROACH;  Surgeon: Eric Clayton MD;  Location: [facility] MAIN OR;  Service:        Visit Dx:     ICD-10-CM ICD-9-CM   1. Right shoulder pain, unspecified chronicity  M25.511 719.41   2. Impaired mobility  Z74.09 799.89          Patient History       Row Name 11/28/23 0600             History    Chief Complaint Difficulty with daily activities;Pain  -GJ      Type of Pain Shoulder pain  R  -GJ      Date Current Problem(s) Began --  1-2 month ago  -GJ      Brief Description of Current Complaint Ms. Meyer is a 67 y/o R handed female. She presents with chief c/o R shoulder pain, over AC joint, lateral deltoid to elbow, never below. No known YAMEL. Hx of RA. She is a stomach sleeper, with both arms over her head. Onset 1-2 months ago. Their condition is unchanging. Pain is constant, achy in nature.. Aggravating activities include donning seat belt, washing hair, donning clothing. Unsure of relieving activities. She reports tingling/coldness of bilateral fingers/toes.Sleep is not disturbed by shoulder pain.Xray of her R shoulder, see MD note. Previous treatments for this condition include 1 injection, helped for about a week. 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  -GJ      Previous treatment for THIS PROBLEM Injections  -GJ      Patient/Caregiver Goals Relieve pain;Improve mobility;Improve strength;Know what to do to help the symptoms  -GJ      Hand Dominance right-handed  -GJ      Occupation/sports/leisure activities , grand kids  -GJ      What clinical tests have you had for this problem? X-ray  -GJ      Results of Clinical Tests see md note  -GJ      Are you or can you be pregnant No  -GJ         Pain     Pain Location Shoulder  -GJ      What Performance Factors Make the Current Problem(s) WORSE? seat belt, donning clothing,  -GJ      What Performance Factors Make the Current Problem(s) BETTER? nothing  -GJ         Fall Risk Assessment    Any falls in the past year: No  -GJ         Daily Activities    Primary Language English  -GJ      Are you able to read Yes  -GJ      Are you able to write Yes  -GJ      How does patient learn best? Demonstration;Pictures/Video;Reading;Listening  -GJ      Teaching needs identified Home Exercise Program;Management of Condition  -GJ      Patient is concerned about/has problems with Difficulty with self care (i.e. bathing, dressing, toileting:;Grasping objects lifting;Performing home management (household chores, shopping, care of dependents);Performing job responsibilities/community activities (work, school,;Performing sports, recreation, and play activities;Reaching over head;Repetitive movements of the hand, arm, shoulder  -GJ      Barriers to learning None  -GJ      Pt Participated in POC and Goals Yes  -GJ                User Key  (r) = Recorded By, (t) = Taken By, (c) = Cosigned By      Initials Name Provider Type    GJ Vijay Munoz, PT Physical Therapist                     PT Ortho       Row Name 11/28/23 0700       Posture/Observations    Alignment Options Forward head;Cervical lordosis;Rounded shoulders;Scapular elevation;Scapular winging;Scoliosis  -GJ    Forward Head Mild  -GJ     Thoracic Kyphosis Increased  -GJ    Rounded Shoulders Bilateral:;Moderate  -GJ    Scapular Elevation Right:;Mild  -GJ    Posture/Observations Comments noted RA deformities R hand  -GJ       Special Tests/Palpation    Special Tests/Palpation Shoulder  -GJ       Shoulder Impingement/Rotator Cuff Special Tests    Qureshi-Avinash Test (RC Lesion vs. Bursitis) Right:;Positive  -GJ    Neer Impingement Test (RC Lesion vs. Bursitis) Right:;Positive  -GJ    Full Can Test (RC Lesion) Right:;Negative  -GJ    Empty Can Test (RC Lesion) Right:;Negative  -GJ    Speed's Test (LH of Biceps Lesion) Right:;Positive  -GJ       Shoulder Girdle Palpation    Shoulder Girdle Palpation? Yes  -GJ    Infraspinatus Right:;Tender  -GJ    Pect Minor Right:;Tender;Guarded/taut  -GJ    Upper Trap Right:;Tender;Guarded/taut  -GJ    Levator Scapula Right:;Tender;Guarded/taut  -GJ       General ROM    Head/Neck/Trunk Neck Extension;Neck Flexion;Neck Lt Rotation;Neck Rt Rotation  -GJ    RT Upper Ext Rt Shoulder ABduction;Rt Shoulder Flexion;Rt Shoulder External Rotation;Rt Shoulder Internal Rotation  -GJ    LT Upper Ext Lt Shoulder ABduction;Lt Shoulder Flexion;Lt Shoulder External Rotation;Lt Shoulder Internal Rotation  -GJ    GENERAL ROM COMMENTS grossly noted bilateral elbow AROM WFL/symetrical. noted RA changes R hand/fingers/wrist, however AROM grossly WFL, as is L  -GJ       Head/Neck/Trunk    Neck Extension AROM WFL  -GJ    Neck Flexion AROM WFL  -GJ    Neck Lt Rotation AROM WFL  -GJ    Neck Rt Rotation AROM WFL  -GJ       Right Upper Ext    Rt Shoulder Abduction AROM 165 seated, pain  -GJ    Rt Shoulder Abduction PROM 180 supine  -GJ    Rt Shoulder Flexion AROM 160 seated, pain  -GJ    Rt Shoulder Flexion PROM 180 supine  -GJ    Rt Shoulder External Rotation AROM KAREN mildine T 2  -GJ    Rt Shoulder External Rotation PROM 102 supinepos  -GJ    Rt Shoulder Internal Rotation AROM FIR midline T 12, pain  -GJ    Rt Shoulder Internal Rotation PROM  70 supine pos  -GJ       Left Upper Ext    Lt Shoulder Abduction AROM 160 seated  -GJ    Lt Shoulder Abduction PROM 180 supine  -GJ    Lt Shoulder Flexion AROM 168 seated  -GJ    Lt Shoulder Flexion PROM 180 supine  -GJ    Lt Shoulder External Rotation AROM KAREN mildine T 3  -GJ    Lt Shoulder External Rotation PROM 100 supine pos  -GJ    Lt Shoulder Internal Rotation AROM FIR mildine T 8  -GJ    Lt Shoulder Internal Rotation PROM 80 supe pos  -GJ       MMT (Manual Muscle Testing)    Rt Upper Ext Rt Shoulder Flexion;Rt Shoulder ABduction;Rt Shoulder Internal Rotation;Rt Shoulder External Rotation;Rt Elbow Flexion;Rt Elbow Extension  -GJ    Lt Upper Ext Lt Shoulder Flexion;Lt Shoulder ABduction;Lt Shoulder Internal Rotation;Lt Shoulder External Rotation;Lt Elbow Extension;Lt Elbow Flexion  -GJ       MMT Right Upper Ext    Rt Shoulder Flexion MMT, Gross Movement (4+/5) good plus  painful  -GJ    Rt Shoulder ABduction MMT, Gross Movement (4+/5) good plus  painful  -GJ    Rt Shoulder Internal Rotation MMT, Gross Movement (4+/5) good plus  painful  -GJ    Rt Shoulder External Rotation MMT, Gross Movement (4+/5) good plus  painful  -GJ    Rt Elbow Flexion MMT, Gross Movement: (4+/5) good plus  -GJ    Rt Elbow Extension MMT, Gross Movement: (4+/5) good plus  -GJ       MMT Left Upper Ext    Lt Shoulder Flexion MMT, Gross Movement (4+/5) good plus  -GJ    Lt Shoulder ABduction MMT, Gross Movement (4+/5) good plus  -GJ    Lt Shoulder Internal Rotation MMT, Gross Movement (4+/5) good plus  -GJ    Lt Shoulder External Rotation MMT, Gross Movement (4+/5) good plus  -GJ    Lt Elbow Flexion MMT, Gross Movement (4+/5) good plus  -GJ    Lt Elbow Extension MMT, Gross Movement (4+/5) good plus  -GJ       Flexibility    Flexibility Tested? Upper Extremity  -GJ       Upper Extremity Flexibility    Scalenes Bilateral:;Mildly limited  -GJ    Upper Trapezius Right:;Mildly limited;Moderately limited  -GJ    Levator Scapula Right:;Mildly  limited;Moderately limited  -GJ    Pect Minor Right:;Moderately limited  -GJ    Pect Major Right:;Mildly limited;Moderately limited  -GJ    Latissimus Dorsi Right:;Mildly limited;Moderately limited  -GJ              User Key  (r) = Recorded By, (t) = Taken By, (c) = Cosigned By      Initials Name Provider Type    Vijay Moreno, PT Physical Therapist                                Therapy Education  Education Details: [id number] discussed dx, px, poc, discussed anatomy of the shoulder and physiology of healing, discussed realistic expectations and time frames for therapy. Discussed activity modification.  Given: HEP, Symptoms/condition management, Pain management, Posture/body mechanics, Mobility training, Edema management  Program: New  How Provided: Verbal, Demonstration, Written  Provided to: Patient  Level of Understanding: Teach back education performed, Verbalized, Demonstrated      PT OP Goals       Row Name 11/28/23 0600          PT Short Term Goals    STG Date to Achieve 12/28/23  -GJ     STG 1 pt. to be I with initial HEP to facilitate self management of their condition  -GJ     STG 1 Progress New  -GJ     STG 2 pt. to be educated in/verbalize understanding of the importance of posture/ergonomics in association with their condition to facilitate self management of their condition  -GJ     STG 2 Progress New  -GJ     STG 3 pt to report/deomstrate adequate RUE AROM to don/doff shirts/clothiing with </= 2/10 R shoulder pain to facilitate ease of performing dressing activities  -GJ     STG 3 Progress New  -GJ        Long Term Goals    LTG Date to Achieve 02/26/24  -GJ     LTG 1 pt. to be I with advanced HEP to facilitate self management of their condition  -GJ     LTG 1 Progress New  -GJ     LTG 2 Pt to report/demonstrate ability to reach and place seat belt in position with her RUE with </= 2/10 R shoulder pain to facilitae ease of performing functional acitivties.  -GJ     LTG 2 Progress New  -GJ     LTG 3  pt. to demonstrate placing/retrieving small object from an above the shoulder level shelf with her {RUE to facilitate ease of performing household/work related activities  -GJ     LTG 3 Progress New  -GJ     LTG 4 --  -GJ     LTG 4 Progress --  -GJ        Time Calculation    PT Goal Re-Cert Due Date 02/26/24  -               User Key  (r) = Recorded By, (t) = Taken By, (c) = Cosigned By      Initials Name Provider Type    Viajy Moreno, PT Physical Therapist                     PT Assessment/Plan       Row Name 11/28/23 0748          PT Assessment    Functional Limitations Limitation in home management;Limitations in community activities;Limitations in functional capacity and performance;Performance in leisure activities;Performance in work activities  -     Impairments Impaired flexibility;Impaired muscle endurance;Impaired muscle length;Impaired muscle power;Impaired postural alignment;Joint mobility;Muscle strength;Pain;Poor body mechanics;Posture;Range of motion  -     Assessment Comments  is a 68-year-old right-handed female.  She presents to the clinic with a chief complaint of right shoulder pain for approximately 1 to 2-month duration.  No overt knowledge of YAMEL.  Pain is described as from the AC joint down the lateral aspect of the deltoid sometimes to the elbow but not below.  Her condition is essentially unchanging.  Pain is constant and aching nature.  Aggravating activities include donning her seatbelt, washing her hair, donning and doffing clothing.  She is unsure of any relieving activities.  She does have a history of RA which affect her right hand and wrist.  She has received an injection for the shoulder which helped for about a week.  She works as a  and enjoys spending time with her grandkids.  She denies sleep disturbance secondary to right shoulder pain.  She denies cervical spine involvement. Ms. Meyer presents to the clinic with mild to moderate forward  head and rounded shoulder posture.  She demonstrates equal active/passive range of motion right and left shoulders however painful on the right at endrange.  Noted to have negative painful arc.  She demonstrates positive Qureshi Avinash and Neer's impingement signs.  She demonstrates equal strength left and right shoulders however painful on the right.  She reports a quick DASH score of 18%, scored 0-100, 0% represents no perceived disability.  Ms. Meeyr  demonstrates evolving s/s consistent with right shoulder impingement which limits her participation in household, dressing, leisure, work-related activities.    Aggravating/Personal factors affecting recovery include,  but are not limited to, demands of her job, history of RA.  Ms. Meyer may benefit from skilled physical therapy to address the above impairments.   -GJ     Please refer to paper survey for additional self-reported information Yes  -GJ     Rehab Potential Excellent  -GJ     Patient/caregiver participated in establishment of treatment plan and goals Yes  -GJ     Patient would benefit from skilled therapy intervention Yes  -GJ        PT Plan    PT Frequency 1x/week;2x/week  -GJ     Predicted Duration of Therapy Intervention (PT) 10 visists  -GJ     Planned CPT's? PT EVAL MOD COMPLELITY: 67823;PT RE-EVAL: 66205;PT THER PROC EA 15 MIN: 38655;PT THER ACT EA 15 MIN: 07828;PT MANUAL THERAPY EA 15 MIN: 58569;PT NEUROMUSC RE-EDUCATION EA 15 MIN: 82635;PT HOT OR COLD PACK TREAT MCARE;PT ELECTRICAL STIM UNATTEND:   -GJ     PT Plan Comments UBE to warm up, shoulder ext/scap rows with t band, supine HA, SL ER, consider RS ER/IR, 110 scaption  -               User Key  (r) = Recorded By, (t) = Taken By, (c) = Cosigned By      Initials Name Provider Type    Vijay Moreno, PT Physical Therapist                       OP Exercises       Row Name 11/28/23 0700 11/28/23 0658          Total Minutes    66744 - PT Therapeutic Exercise Minutes -- 12  -GJ         Exercise 1    Exercise Name 1 UBE  -GJ --     Additional Comments next session  -GJ --        Exercise 2    Exercise Name 2 chin tuck  -GJ --     Cueing 2 Verbal;Demo  -GJ --     Reps 2 15  -GJ --     Time 2 5s  -GJ --     Additional Comments seated  -GJ --        Exercise 3    Exercise Name 3 scap retraction  -GJ --     Cueing 3 Verbal;Demo  -GJ --     Reps 3 15  -GJ --     Time 3 5s  -GJ --        Exercise 4    Exercise Name 4 shoulder flexoin on wall  -GJ --     Cueing 4 Verbal;Demo  -GJ --     Reps 4 15  -GJ --        Exercise 5    Exercise Name 5 doorway stretch  -GJ --     Cueing 5 Verbal;Demo  -GJ --     Reps 5 3  -GJ --     Time 5 20s  -GJ --     Additional Comments arms at or below 90  -GJ --        Exercise 6    Exercise Name 6 bilateral shoulder ER  -GJ --     Cueing 6 Verbal;Demo  -GJ --     Reps 6 15  -GJ --     Time 6 RTB  -GJ --               User Key  (r) = Recorded By, (t) = Taken By, (c) = Cosigned By      Initials Name Provider Type    Vijay Moreno, PT Physical Therapist                                  Outcome Measure Options: Quick DASH  Quick DASH  Open a tight or new jar.: Mild Difficulty  Do heavy household chores (e.g., wash walls, wash floors): Mild Difficulty  Carry a shopping bag or briefcase: Mild Difficulty  Wash your back: Mild Difficulty  Use a knife to cut food: No Difficulty  Recreational activities in which you take some force or impact through your arm, should or hand (e.g. golf, hammering, tennis, etc.): No Difficulty  During the past week, to what extent has your arm, shoulder, or hand problem interfered with your normal social activites with family, friends, neighbors or groups?: Not at all  During the past week, were you limited in your work or other regular daily activities as a result of your arm, shoulder or hand problem?: Slightly Limited  Arm, Shoulder, or hand pain: Moderate  Tingling (pins and needles) in your arm, shoulder, or hand: None  During the past week,  how much difficulty have you had sleeping because of the pain in your arm, shoulder or hand?: Mild Difficulty  Number of Questions Answered: 11  Quick DASH Score: 18.18         Time Calculation:     Start Time: 0700  Stop Time: 0745  Time Calculation (min): 45 min  Timed Charges  05562 - PT Therapeutic Exercise Minutes: 12  Total Minutes  Timed Charges Total Minutes: 12   Total Minutes: 12     Therapy Charges for Today       Code Description Service Date Service Provider Modifiers Qty    25257464671 HC PT THER PROC EA 15 MIN 11/28/2023 Vijay Munoz, PT GP 1    52702833995 HC PT EVAL LOW COMPLEXITY 2 11/28/2023 Vijay Munoz, PT GP 1            PT G-Codes  Outcome Measure Options: Quick DASH  Quick DASH Score: 18.18         Vijay Munoz, PT  11/28/2023

## 2023-12-13 ENCOUNTER — HOSPITAL ENCOUNTER (OUTPATIENT)
Dept: PHYSICAL THERAPY | Facility: HOSPITAL | Age: 68
Setting detail: THERAPIES SERIES
Discharge: HOME | End: 2023-12-13
Payer: MEDICARE

## 2023-12-13 DIAGNOSIS — M25.511 RIGHT SHOULDER PAIN, UNSPECIFIED CHRONICITY: Primary | ICD-10-CM

## 2023-12-13 DIAGNOSIS — Z74.09 IMPAIRED MOBILITY: ICD-10-CM

## 2023-12-13 PROCEDURE — 97110 THERAPEUTIC EXERCISES: CPT | Performed by: PHYSICAL THERAPIST

## 2023-12-13 NOTE — THERAPY TREATMENT NOTE
Outpatient Physical Therapy Ortho Treatment Note  [facility]     Patient Name: Stacey Meyer  : 1955  MRN: [id number]  Today's Date: 2023      Visit Date: 2023    Visit Dx:    ICD-10-CM ICD-9-CM   1. Right shoulder pain, unspecified chronicity  M25.511 719.41   2. Impaired mobility  Z74.09 799.89       Patient Active Problem List   Diagnosis    Degeneration of lumbar intervertebral disc    Degeneration, intervertebral disc, thoracic    Fibromyalgia    Transient insomnia    Primary osteoarthritis of right hip    Familial ligamentous laxity    Hypertension    OA (osteoarthritis) of hip    Acquired hypothyroidism    Rheumatoid arthritis of multiple sites with negative rheumatoid factor    Gigantomastia    Erosive osteoarthritis    Depression with anxiety    Mixed hyperlipidemia    Arthritis of foot    Closed nondisplaced fracture of fifth right metatarsal bone    Arthritis of first metatarsophalangeal (MTP) joint of right foot    Blurry vision        Past Medical History:   Diagnosis Date    Allergic ?    percadan sulfa emycin tetriccyclin flagel    Anxiety     Closed nondisplaced fracture of fifth right metatarsal bone 2023    Degeneration, intervertebral disc, thoracic     Depression     Depression 2016    Erosive osteoarthritis     Dr Galeano    History of peptic ulcer     History of transfusion     AFTER HIP REPLACEMENT     Hypertension     Hypothyroidism many years    Knee swelling 2009    OA (osteoarthritis) of hip     Osteoarthritis     PONV (postoperative nausea and vomiting)     Primary osteoarthritis of right hip     Rheumatoid arthritis     Thyroid disease     HYPOTHYROID    Visual impairment readers        Past Surgical History:   Procedure Laterality Date    BILATERAL BREAST REDUCTION Bilateral 2018    Procedure: BREAST REDUCTION BILATERAL, NEW IMAGE;  Surgeon: Myranda Perea MD;  Location: [facility] OR;  Service: New Image    BREAST SURGERY   "2018    BUNIONECTOMY Left     COLONOSCOPY      2009    COLONOSCOPY N/A 06/26/2019    Procedure: COLONOSCOPY WITH COLD POLYPECTOMY x 2;  Surgeon: Danny Costa MD;  Location: [facility] ENDOSCOPY;  Service: Gastroenterology    ECTOPIC PREGNANCY SURGERY Bilateral     2 SEPARATE SURGERIES    ENDOSCOPY N/A 05/24/2016    Procedure: ESOPHAGOGASTRODUODENOSCOPY WITH COLD BIOPSIES;  Surgeon: Danny Costa MD;  Location: [facility] ENDOSCOPY;  Service:     ENDOSCOPY N/A 11/01/2016    Procedure: ESOPHAGOGASTRODUODENOSCOPY;  Surgeon: Danny Costa MD;  Location: [facility] ENDOSCOPY;  Service:     FERTILITY SURGERY      FOOT SURGERY Bilateral     \"THEY BROKE MY 5TH TOE ON BOTH SIDES AND STRIGHTENED THEM\"     HAMMER TOE REPAIR Bilateral     HAND SURGERY  04/28/2021    left hand     JOINT REPLACEMENT Right 07/15/2020    hand    TONSILLECTOMY      TOTAL HIP ARTHROPLASTY Right 07/13/2016    Procedure: RIGHT TOTAL HIP ARTHROPLASTY ANTERIOR APPROACH;  Surgeon: Eric Clayton MD;  Location: [facility] OR;  Service:                         PT Assessment/Plan       Row Name 12/13/23 0755          PT Assessment    Assessment Comments Ms. Meyer returns for her first follow up session of PT for her R shoulder pain. She reports good adherence to her HEP and improvement in her condition. We reviewed current program and progressed scapular girdle/RC strengthening today, updatinig HEP accourdingly.  Strengthening to be once every other day, stretches daily. She toleated all new exercises without immediate adverse response. Ms. Meyer continues to be a good candidate for skilled phyiscal therapy.  -GJ        PT Plan    PT Plan Comments assess reponse to new strengthening activities. likely minimal changes to HEP. in the clinic, consider progressing HA to standing and adding D2 pattern, shelf reach? if she is progressing well and at currrent rate  -               User Key  (r) = Recorded By, (t) = Taken By, (c) = Cosigned By      Initials Name " Provider Type    Vijay Moreno, PT Physical Therapist                       OP Exercises       Row Name 12/13/23 0749 12/13/23 0700          Subjective    Subjective Comments -- I have been doing the exercises and I think my shoulder is better  -GJ        Total Minutes    36716 - PT Therapeutic Exercise Minutes 28  -GJ --        Exercise 1    Exercise Name 1 -- UBE  -GJ     Time 1 -- 4 min  -GJ        Exercise 2    Exercise Name 2 -- chin tuck  -GJ     Cueing 2 -- Verbal;Demo  -GJ     Reps 2 -- 15  -GJ     Time 2 -- 5s  -GJ     Additional Comments -- seated  -GJ        Exercise 3    Exercise Name 3 -- scap retraction/shoulder ext  -GJ     Cueing 3 -- Verbal;Demo  -GJ     Sets 3 -- 2, each  -GJ     Reps 3 -- 10  -GJ     Time 3 -- RTB  -GJ        Exercise 4    Exercise Name 4 -- shoulder flexoin on wall  -GJ     Cueing 4 -- Verbal;Demo  -GJ     Reps 4 -- 15  -GJ     Time 4 -- 3s  -GJ        Exercise 5    Exercise Name 5 -- doorway stretch  -GJ     Cueing 5 -- Verbal;Demo  -GJ     Reps 5 -- 3  -GJ     Time 5 -- 20s  -GJ     Additional Comments -- arms at or below 90  -GJ        Exercise 6    Exercise Name 6 -- bilateral shoulder ER  -GJ     Cueing 6 -- Verbal;Demo  -GJ     Sets 6 -- 2  -GJ     Reps 6 -- 10  -GJ     Time 6 -- RTB  -GJ     Additional Comments -- standing  -GJ        Exercise 7    Exercise Name 7 -- supine shoulder horizontal abduction  -GJ     Cueing 7 -- Verbal;Demo  -GJ     Sets 7 -- 2  -GJ     Reps 7 -- 10  -GJ     Time 7 -- RTB  -GJ        Exercise 8    Exercise Name 8 -- SL ER  -GJ     Cueing 8 -- Verbal;Demo  -GJ     Sets 8 -- 2  -GJ     Reps 8 -- 10  -GJ     Time 8 -- 1#,  -GJ     Additional Comments -- towel under humerus  -GJ               User Key  (r) = Recorded By, (t) = Taken By, (c) = Cosigned By      Initials Name Provider Type    Vijay Moreno, PT Physical Therapist                                  PT OP Goals       Row Name 12/13/23 0700          PT Short Term Goals    STG Date  to Achieve 12/28/23  -GJ     STG 1 pt. to be I with initial HEP to facilitate self management of their condition  -GJ     STG 1 Progress Ongoing  -GJ     STG 1 Progress Comments reviewed, intermittent cues  -GJ     STG 2 pt. to be educated in/verbalize understanding of the importance of posture/ergonomics in association with their condition to facilitate self management of their condition  -GJ     STG 2 Progress Ongoing  -GJ     STG 2 Progress Comments reviewed  -GJ     STG 3 pt to report/deomstrate adequate RUE AROM to don/doff shirts/clothiing with </= 2/10 R shoulder pain to facilitate ease of performing dressing activities  -GJ     STG 3 Progress Ongoing  -GJ        Long Term Goals    LTG Date to Achieve 02/26/24  -GJ     LTG 1 pt. to be I with advanced HEP to facilitate self management of their condition  -GJ     LTG 1 Progress Ongoing  -GJ     LTG 2 Pt to report/demonstrate ability to reach and place seat belt in position with her RUE with </= 2/10 R shoulder pain to facilitae ease of performing functional acitivties.  -GJ     LTG 2 Progress Ongoing  -GJ     LTG 3 pt. to demonstrate placing/retrieving small object from an above the shoulder level shelf with her {RUE to facilitate ease of performing household/work related activities  -GJ     LTG 3 Progress Ongoing  -GJ               User Key  (r) = Recorded By, (t) = Taken By, (c) = Cosigned By      Initials Name Provider Type    Vijay Moreno, PT Physical Therapist                    Therapy Education  Education Details: [id number], updated HEP, reviewed activity modifications, strengthening to be once every othe day, stretches/ROM daily  Given: HEP, Symptoms/condition management, Pain management, Posture/body mechanics, Fall prevention and home safety, Mobility training  Program: Reinforced, New, Progressed  How Provided: Verbal, Demonstration, Written  Provided to: Patient  Level of Understanding: Teach back education performed, Verbalized,  Demonstrated              Time Calculation:   Start Time: 0749  Stop Time: 0817  Time Calculation (min): 28 min  Timed Charges  45548 - PT Therapeutic Exercise Minutes: 28  Total Minutes  Timed Charges Total Minutes: 28   Total Minutes: 28  Therapy Charges for Today       Code Description Service Date Service Provider Modifiers Qty    [id number]  PT THER PROC EA 15 MIN 12/13/2023 Vijay Munoz, PT GP 2                      Vijay Munoz, PT  12/13/2023

## 2023-12-22 RX ORDER — ESCITALOPRAM OXALATE 10 MG/1
10 TABLET ORAL DAILY
Qty: 90 TABLET | Refills: 1 | Status: SHIPPED | OUTPATIENT
Start: 2023-12-22

## 2023-12-27 ENCOUNTER — HOSPITAL ENCOUNTER (OUTPATIENT)
Dept: GENERAL RADIOLOGY | Facility: HOSPITAL | Age: 68
Discharge: HOME | End: 2023-12-27
Payer: MEDICARE

## 2023-12-27 DIAGNOSIS — M19.071 ARTHRITIS OF FIRST METATARSOPHALANGEAL (MTP) JOINT OF RIGHT FOOT: ICD-10-CM

## 2023-12-27 PROCEDURE — 25010000002 LIDOCAINE 1 % SOLUTION: Performed by: ORTHOPAEDIC SURGERY

## 2023-12-27 PROCEDURE — 25510000001 IOPAMIDOL 61 % SOLUTION: Performed by: ORTHOPAEDIC SURGERY

## 2023-12-27 PROCEDURE — 25010000002 BUPIVACAINE (PF) 0.25 % SOLUTION: Performed by: ORTHOPAEDIC SURGERY

## 2023-12-27 PROCEDURE — 77002 NEEDLE LOCALIZATION BY XRAY: CPT

## 2023-12-27 PROCEDURE — 25010000002 METHYLPREDNISOLONE PER 40 MG: Performed by: ORTHOPAEDIC SURGERY

## 2023-12-27 RX ORDER — LIDOCAINE HYDROCHLORIDE 10 MG/ML
20 INJECTION, SOLUTION INFILTRATION; PERINEURAL ONCE
Status: COMPLETED | OUTPATIENT
Start: 2023-12-27 | End: 2023-12-27

## 2023-12-27 RX ORDER — BUPIVACAINE HYDROCHLORIDE 2.5 MG/ML
10 INJECTION, SOLUTION EPIDURAL; INFILTRATION; INTRACAUDAL ONCE
Status: COMPLETED | OUTPATIENT
Start: 2023-12-27 | End: 2023-12-27

## 2023-12-27 RX ORDER — METHYLPREDNISOLONE SODIUM SUCCINATE 40 MG/ML
40 INJECTION, POWDER, LYOPHILIZED, FOR SOLUTION INTRAMUSCULAR; INTRAVENOUS
Status: COMPLETED | OUTPATIENT
Start: 2023-12-27 | End: 2023-12-27

## 2023-12-27 RX ADMIN — BUPIVACAINE HYDROCHLORIDE 2 ML: 2.5 INJECTION, SOLUTION EPIDURAL; INFILTRATION; INTRACAUDAL; PERINEURAL at 09:08

## 2023-12-27 RX ADMIN — LIDOCAINE HYDROCHLORIDE 1 ML: 10 INJECTION, SOLUTION INFILTRATION; PERINEURAL at 09:08

## 2023-12-27 RX ADMIN — IOPAMIDOL 0.5 ML: 612 INJECTION, SOLUTION INTRAVENOUS at 09:08

## 2023-12-27 RX ADMIN — METHYLPREDNISOLONE SODIUM SUCCINATE 40 MG: 40 INJECTION, POWDER, LYOPHILIZED, FOR SOLUTION INTRAMUSCULAR; INTRAVENOUS at 09:08

## 2024-01-09 ENCOUNTER — HOSPITAL ENCOUNTER (OUTPATIENT)
Dept: PHYSICAL THERAPY | Facility: HOSPITAL | Age: 69
Setting detail: THERAPIES SERIES
Discharge: HOME | End: 2024-01-09
Payer: MEDICARE

## 2024-01-09 DIAGNOSIS — Z74.09 IMPAIRED MOBILITY: ICD-10-CM

## 2024-01-09 DIAGNOSIS — M25.511 RIGHT SHOULDER PAIN, UNSPECIFIED CHRONICITY: Primary | ICD-10-CM

## 2024-01-09 PROCEDURE — 97110 THERAPEUTIC EXERCISES: CPT | Performed by: PHYSICAL THERAPIST

## 2024-01-09 NOTE — THERAPY DISCHARGE NOTE
Outpatient Physical Therapy Ortho Treatment Note/Discharge Summary  [facility]     Patient Name: Stacey Meyer  : 1955  MRN: [id number]  Today's Date: 2024      Visit Date: 2024    Visit Dx:    ICD-10-CM ICD-9-CM   1. Right shoulder pain, unspecified chronicity  M25.511 719.41   2. Impaired mobility  Z74.09 799.89       Patient Active Problem List   Diagnosis    Degeneration of lumbar intervertebral disc    Degeneration, intervertebral disc, thoracic    Fibromyalgia    Transient insomnia    Primary osteoarthritis of right hip    Familial ligamentous laxity    Hypertension    OA (osteoarthritis) of hip    Acquired hypothyroidism    Rheumatoid arthritis of multiple sites with negative rheumatoid factor    Gigantomastia    Erosive osteoarthritis    Depression with anxiety    Mixed hyperlipidemia    Arthritis of foot    Closed nondisplaced fracture of fifth right metatarsal bone    Arthritis of first metatarsophalangeal (MTP) joint of right foot    Blurry vision        Past Medical History:   Diagnosis Date    Allergic ?    percadan sulfa emycin tetriccyclin flagel    Anxiety     Closed nondisplaced fracture of fifth right metatarsal bone 2023    Degeneration, intervertebral disc, thoracic     Depression     Depression 2016    Erosive osteoarthritis     Dr Galeano    History of peptic ulcer     History of transfusion     AFTER HIP REPLACEMENT     Hypertension     Hypothyroidism many years    Knee swelling 2009    OA (osteoarthritis) of hip     Osteoarthritis     PONV (postoperative nausea and vomiting)     Primary osteoarthritis of right hip     Rheumatoid arthritis     Thyroid disease     HYPOTHYROID    Visual impairment readers        Past Surgical History:   Procedure Laterality Date    BILATERAL BREAST REDUCTION Bilateral 2018    Procedure: BREAST REDUCTION BILATERAL, NEW IMAGE;  Surgeon: Myranda Perea MD;  Location: [facility] OR;  Service: New Image     "BREAST SURGERY  2018    BUNIONECTOMY Left     COLONOSCOPY      2009    COLONOSCOPY N/A 06/26/2019    Procedure: COLONOSCOPY WITH COLD POLYPECTOMY x 2;  Surgeon: Danny Costa MD;  Location: [facility] ENDOSCOPY;  Service: Gastroenterology    ECTOPIC PREGNANCY SURGERY Bilateral     2 SEPARATE SURGERIES    ENDOSCOPY N/A 05/24/2016    Procedure: ESOPHAGOGASTRODUODENOSCOPY WITH COLD BIOPSIES;  Surgeon: Danny Costa MD;  Location: [facility] ENDOSCOPY;  Service:     ENDOSCOPY N/A 11/01/2016    Procedure: ESOPHAGOGASTRODUODENOSCOPY;  Surgeon: Danny Costa MD;  Location: [facility] ENDOSCOPY;  Service:     FERTILITY SURGERY      FOOT SURGERY Bilateral     \"THEY BROKE MY 5TH TOE ON BOTH SIDES AND STRIGHTENED THEM\"     HAMMER TOE REPAIR Bilateral     HAND SURGERY  04/28/2021    left hand     JOINT REPLACEMENT Right 07/15/2020    hand    TONSILLECTOMY      TOTAL HIP ARTHROPLASTY Right 07/13/2016    Procedure: RIGHT TOTAL HIP ARTHROPLASTY ANTERIOR APPROACH;  Surgeon: Eric Clayton MD;  Location: [facility] OR;  Service:                         PT Assessment/Plan       Row Name 01/09/24 0704          PT Assessment    Assessment Comments Ms. Meyer has attended 3 sessions of physical therapy from 11/28/2023-01/09/2024 for her R shoulder pain. She is independent with her HEP, verbalizes a good understanding of her condition and reports that he shoulder isn't perfect but much better. Encouraged her to continue her HEP once every other day, stretching/ROm daily and to call with quesitons/ concerns.  Updated HEP, by changing positions from supine to standing with MAS. Ms. Meyer will now transition from outpatient physical therpay to an independent program.  -GJ        PT Plan    PT Plan Comments transition to independent management, call with questions  -               User Key  (r) = Recorded By, (t) = Taken By, (c) = Cosigned By      Initials Name Provider Type    Vijay Moreno, PT Physical Therapist                         " OP Exercises       Row Name 01/09/24 0657 01/09/24 0600          Subjective    Subjective Comments -- feeling much better  -GJ        Total Minutes    44560 - PT Therapeutic Exercise Minutes 28  -GJ --        Exercise 1    Exercise Name 1 -- UBE  -GJ     Time 1 -- 4 min  -GJ        Exercise 2    Exercise Name 2 -- chin tuck  -GJ     Cueing 2 -- Verbal;Demo  -GJ     Reps 2 -- 15  -GJ     Time 2 -- 5s  -GJ     Additional Comments -- seated  -GJ        Exercise 3    Exercise Name 3 -- scap retraction/shoulder ext  -GJ     Cueing 3 -- Verbal;Demo  -GJ     Sets 3 -- 2, each  -GJ     Reps 3 -- 10  -GJ     Time 3 -- RTB  -GJ        Exercise 4    Exercise Name 4 -- shoulder flexoin on wall  -GJ     Cueing 4 -- Verbal;Demo  -GJ     Reps 4 -- 15  -GJ     Time 4 -- 3s  -GJ        Exercise 5    Exercise Name 5 -- doorway stretch  -GJ     Cueing 5 -- Verbal;Demo  -GJ     Reps 5 -- 3  -GJ     Time 5 -- 20s  -GJ     Additional Comments -- arms at or below 90  -GJ        Exercise 6    Exercise Name 6 -- bilateral shoulder ER  -GJ     Cueing 6 -- Verbal;Demo  -GJ     Sets 6 -- 2  -GJ     Reps 6 -- 10  -GJ     Time 6 -- RTB  -GJ     Additional Comments -- standing  -GJ        Exercise 7    Exercise Name 7 -- standing shoulder HA  -GJ     Cueing 7 -- Verbal;Demo  -GJ     Sets 7 -- 2  -GJ     Reps 7 -- 10  -GJ     Time 7 -- RTB  -GJ        Exercise 8    Exercise Name 8 -- SL ER  -GJ     Cueing 8 -- Verbal;Demo  -GJ     Sets 8 -- 2  -GJ     Reps 8 -- 10  -GJ     Time 8 -- 1#,  -GJ     Additional Comments -- towel under humerus  -GJ               User Key  (r) = Recorded By, (t) = Taken By, (c) = Cosigned By      Initials Name Provider Type    GJ Vijay Munoz, PT Physical Therapist                                    PT OP Goals       Row Name 01/09/24 0600          PT Short Term Goals    STG Date to Achieve 12/28/23  -GJ     STG 1 pt. to be I with initial HEP to facilitate self management of their condition  -GJ     STG 1 Progress Met   -     STG 2 pt. to be educated in/verbalize understanding of the importance of posture/ergonomics in association with their condition to facilitate self management of their condition  -     STG 2 Progress Met  -     STG 3 pt to report/deomstrate adequate RUE AROM to don/doff shirts/clothiing with </= 2/10 R shoulder pain to facilitate ease of performing dressing activities  -     STG 3 Progress Met  -        Long Term Goals    LTG Date to Achieve 02/26/24  -     LTG 1 pt. to be I with advanced HEP to facilitate self management of their condition  -     LTG 1 Progress Met  -     LTG 2 Pt to report/demonstrate ability to reach and place seat belt in position with her RUE with </= 2/10 R shoulder pain to facilitae ease of performing functional acitivties.  -     LTG 2 Progress Met  -     LTG 3 pt. to demonstrate placing/retrieving small object from an above the shoulder level shelf with her {RUE to facilitate ease of performing household/work related activities  -     LTG 3 Progress Met  -               User Key  (r) = Recorded By, (t) = Taken By, (c) = Cosigned By      Initials Name Provider Type    Vijay Moreno, PT Physical Therapist                    Therapy Education  Education Details: [id number], updated HEP, reviewed activity modifications. encoruaged her to continue HEP, strengthening once every other day, stretches daily. call with questions  Given: HEP, Symptoms/condition management, Pain management, Posture/body mechanics, Fall prevention and home safety, Mobility training  Program: Reinforced  How Provided: Verbal, Demonstration, Written  Provided to: Patient  Level of Understanding: Teach back education performed, Verbalized, Demonstrated              Time Calculation:   Start Time: 0700  Stop Time: 0728  Time Calculation (min): 28 min  Timed Charges  11421 - PT Therapeutic Exercise Minutes: 28  Total Minutes  Timed Charges Total Minutes: 28   Total Minutes: 28  Therapy  Charges for Today       Code Description Service Date Service Provider Modifiers Qty    39516069113 HC PT THER PROC EA 15 MIN 1/9/2024 Vijay Munoz, PT GP 2                  OP PT Discharge Summary  Date of Discharge: 01/09/24  Reason for Discharge: All goals achieved, Independent, Patient/Caregiver request  Outcomes Achieved: Able to achieve all goals within established timeline  Discharge Destination: Home with home program  Discharge Instructions/Additional Comments: continue HEP, call with questions      Vijay Munoz, PT  1/9/2024

## 2024-01-30 ENCOUNTER — OFFICE VISIT (OUTPATIENT)
Dept: INTERNAL MEDICINE | Facility: CLINIC | Age: 69
End: 2024-01-30
Payer: MEDICARE

## 2024-01-30 VITALS
SYSTOLIC BLOOD PRESSURE: 120 MMHG | HEART RATE: 65 BPM | HEIGHT: 60 IN | BODY MASS INDEX: 21.01 KG/M2 | DIASTOLIC BLOOD PRESSURE: 72 MMHG | OXYGEN SATURATION: 99 % | WEIGHT: 107 LBS

## 2024-01-30 DIAGNOSIS — E03.9 ACQUIRED HYPOTHYROIDISM: Primary | Chronic | ICD-10-CM

## 2024-01-30 DIAGNOSIS — F41.8 DEPRESSION WITH ANXIETY: Chronic | ICD-10-CM

## 2024-01-30 DIAGNOSIS — I10 PRIMARY HYPERTENSION: Chronic | ICD-10-CM

## 2024-01-30 LAB
ALBUMIN SERPL-MCNC: 4.6 G/DL (ref 3.5–5.2)
ALBUMIN/GLOB SERPL: 2.4 G/DL
ALP SERPL-CCNC: 73 U/L (ref 39–117)
ALT SERPL-CCNC: 33 U/L (ref 1–33)
AST SERPL-CCNC: 36 U/L (ref 1–32)
BILIRUB SERPL-MCNC: 0.6 MG/DL (ref 0–1.2)
BUN SERPL-MCNC: 15 MG/DL (ref 8–23)
BUN/CREAT SERPL: 19.5 (ref 7–25)
CALCIUM SERPL-MCNC: 10 MG/DL (ref 8.6–10.5)
CHLORIDE SERPL-SCNC: 98 MMOL/L (ref 98–107)
CO2 SERPL-SCNC: 27.2 MMOL/L (ref 22–29)
CREAT SERPL-MCNC: 0.77 MG/DL (ref 0.57–1)
EGFRCR SERPLBLD CKD-EPI 2021: 85.2 ML/MIN/1.73
GLOBULIN SER CALC-MCNC: 1.9 GM/DL
GLUCOSE SERPL-MCNC: 85 MG/DL (ref 65–99)
POTASSIUM SERPL-SCNC: 4.6 MMOL/L (ref 3.5–5.2)
PROT SERPL-MCNC: 6.5 G/DL (ref 6–8.5)
SODIUM SERPL-SCNC: 138 MMOL/L (ref 136–145)
TSH SERPL DL<=0.005 MIU/L-ACNC: 2.75 UIU/ML (ref 0.27–4.2)

## 2024-01-30 PROCEDURE — 3078F DIAST BP <80 MM HG: CPT | Performed by: NURSE PRACTITIONER

## 2024-01-30 PROCEDURE — 3074F SYST BP LT 130 MM HG: CPT | Performed by: NURSE PRACTITIONER

## 2024-01-30 PROCEDURE — 99214 OFFICE O/P EST MOD 30 MIN: CPT | Performed by: NURSE PRACTITIONER

## 2024-01-30 PROCEDURE — 1159F MED LIST DOCD IN RCRD: CPT | Performed by: NURSE PRACTITIONER

## 2024-01-30 PROCEDURE — 1160F RVW MEDS BY RX/DR IN RCRD: CPT | Performed by: NURSE PRACTITIONER

## 2024-01-30 RX ORDER — LOSARTAN POTASSIUM 25 MG/1
25 TABLET ORAL DAILY
Qty: 90 TABLET | Refills: 1 | Status: SHIPPED | OUTPATIENT
Start: 2024-01-30

## 2024-01-30 RX ORDER — LEVOTHYROXINE SODIUM 112 UG/1
112 TABLET ORAL DAILY
Qty: 90 TABLET | Refills: 1 | Status: SHIPPED | OUTPATIENT
Start: 2024-01-30

## 2024-01-30 NOTE — ASSESSMENT & PLAN NOTE
Hypertension is improving with treatment.  Medication changes per orders.  Blood pressure will be reassessed at the next regular appointment.  Continue losartan 25 mg daily.   She will monitor SBP at home: if not consistently below 130 - she will let me know and will increase dose.

## 2024-01-30 NOTE — PROGRESS NOTES
Chief Complaint  Hypertension     Subjective:      History of Present Illness {CC  Problem List  Visit  Diagnosis   Encounters  Notes  Medications  Labs  Result Review Imaging  Media :23}     Stacey Meyer presents to [facility] for:  Hypertension, hypothyroid, rheumatoid arthritis, depression      Hypertension: restarted losartan last fall.   She is down to 25 mg daily.     Depression: no longer on lexapro/ buspar  Stopped in October: job is much better.       Answers submitted by the patient for this visit:  Other (Submitted on 1/27/2024)  Please describe your symptoms.: high blood pressure and arthritis  Have you had these symptoms before?: Yes  How long have you been having these symptoms?: Greater than 2 weeks  Primary Reason for Visit (Submitted on 1/27/2024)  What is the primary reason for your visit?: Other      I have reviewed patient's medical history, any new submitted information provided by patient or medical assistant and updated medical record.      Objective:      Physical Exam  Constitutional:       Comments: Appears euthyroid    Cardiovascular:      Rate and Rhythm: Normal rate.      Pulses: Normal pulses.      Heart sounds: Normal heart sounds.   Pulmonary:      Effort: Pulmonary effort is normal.      Breath sounds: Normal breath sounds.   Neurological:      Mental Status: She is oriented to person, place, and time.        Result Review  Data Reviewed:{ Labs  Result Review  Imaging  Med Tab  Media :23}     The following data was reviewed by: Kayli Tucker III, NP-C on 01/30/2024  Common labs          7/26/2023    11:03 8/16/2023    08:14 8/16/2023    08:33   Common Labs   Glucose 90  101     BUN 17  15     Creatinine 0.70  0.65  0.60    Sodium 140  140     Potassium 4.8  4.4     Chloride 101  98     Calcium 10.4  10.4     Total Protein 6.1      Albumin 4.6  4.9     Total Bilirubin 0.6  1.4     Alkaline Phosphatase 65  70     AST  "(SGOT) 22  31     ALT (SGPT) 19  22     WBC 4.88  4.50     Hemoglobin 13.7  14.5     Hematocrit 40.0  43.7     Platelets 321  397     Total Cholesterol  254     Total Cholesterol 208      Triglycerides 80  83     HDL Cholesterol 64  88     LDL Cholesterol  130  152     Hemoglobin A1C  5.20              Vital Signs:   /72 (BP Location: Left arm, Patient Position: Sitting, Cuff Size: Adult)   Pulse 65   Ht 152.4 cm (60\")   Wt 48.5 kg (107 lb)   SpO2 99%   BMI 20.90 kg/m²         Requested Prescriptions     Signed Prescriptions Disp Refills    levothyroxine (SYNTHROID, LEVOTHROID) 112 MCG tablet 90 tablet 1     Sig: Take 1 tablet by mouth Daily.    losartan (COZAAR) 25 MG tablet 90 tablet 1     Sig: Take 1 tablet by mouth Daily.       Routine medications provided by this office will also be refilled via pharmacy request.       Current Outpatient Medications:     hydroxychloroquine (PLAQUENIL) 200 MG tablet, Take 1 tablet by mouth Every Other Day., Disp: , Rfl:     levothyroxine (SYNTHROID, LEVOTHROID) 112 MCG tablet, Take 1 tablet by mouth Daily., Disp: 90 tablet, Rfl: 1    losartan (COZAAR) 25 MG tablet, Take 1 tablet by mouth Daily., Disp: 90 tablet, Rfl: 1     Assessment and Plan:      Assessment and Plan {CC Problem List  Visit Diagnosis  ROS  Review (Popup)  Health Maintenance  Quality  BestPractice  Medications  SmartSets  SnapShot Encounters  Media :23}     Problem List Items Addressed This Visit          Cardiac and Vasculature    Hypertension (Chronic)    Overview     Prior treatment:  Avalide, losartan   Current treatment:  losartan          Current Assessment & Plan     Hypertension is improving with treatment.  Medication changes per orders.  Blood pressure will be reassessed at the next regular appointment.  Continue losartan 25 mg daily.   She will monitor SBP at home: if not consistently below 130 - she will let me know and will increase dose.          Relevant Medications    " losartan (COZAAR) 25 MG tablet    Other Relevant Orders    Comprehensive Metabolic Panel       Endocrine and Metabolic    Acquired hypothyroidism - Primary (Chronic)    Current Assessment & Plan     Stable   Check lab for ongoing therapeutic drug monitoring           Relevant Medications    levothyroxine (SYNTHROID, LEVOTHROID) 112 MCG tablet    Other Relevant Orders    TSH Rfx On Abnormal To Free T4       Mental Health    Depression with anxiety (Chronic)    Current Assessment & Plan     Improved with job change.   No longer needs medication.             Follow Up {Instructions Charge Capture  Follow-up Communications :23}     Return in about 6 months (around 7/30/2024) for Medicare Wellness.      Patient was given instructions and counseling regarding her condition or for health maintenance advice. Please see specific information pulled into the AVS if appropriate.    Dragon disclaimer:   Much of this encounter note is an electronic transcription/translation of spoken language to printed text. The electronic translation of spoken language may permit erroneous, or at times, nonsensical words or phrases to be inadvertently transcribed; Although I have reviewed the note for such errors, some may still exist.     Additional Patient Counseling:       There are no Patient Instructions on file for this visit.

## 2024-02-12 ENCOUNTER — TELEPHONE (OUTPATIENT)
Dept: INTERNAL MEDICINE | Facility: CLINIC | Age: 69
End: 2024-02-12
Payer: MEDICARE

## 2024-02-12 DIAGNOSIS — F51.04 PSYCHOPHYSIOLOGICAL INSOMNIA: Primary | ICD-10-CM

## 2024-02-12 RX ORDER — TRAZODONE HYDROCHLORIDE 50 MG/1
50 TABLET ORAL NIGHTLY
Qty: 7 TABLET | Refills: 0 | Status: SHIPPED | OUTPATIENT
Start: 2024-02-12

## 2024-03-08 ENCOUNTER — TELEPHONE (OUTPATIENT)
Dept: ORTHOPEDIC SURGERY | Facility: CLINIC | Age: 69
End: 2024-03-08
Payer: MEDICARE

## 2024-03-08 DIAGNOSIS — M19.071 ARTHRITIS OF FIRST METATARSOPHALANGEAL (MTP) JOINT OF RIGHT FOOT: Primary | ICD-10-CM

## 2024-03-08 NOTE — TELEPHONE ENCOUNTER
Caller: ZAN BABIN    Relationship to patient: SELF    Best call back number: [phone]    Chief complaint:  PATIENT IS ASKING TO BE SCHEDULED FOR ANOTHER FLUORO-GUIDED INJECTION OF HER RIGHT FOOT. WOULD LIKE DONE THE FIRST PART OF APRIL.    Type of visit: INJECTION

## 2024-03-08 NOTE — TELEPHONE ENCOUNTER
I returned patient's phone call and she has had some recurrence of discomfort around her right first MTP joint over the last week.  She request another injection which I think is reasonable given the relief that she had from the previous 1.  She has been to see about having this done sometime in April.  She understands that if symptoms become refractory to injections we could entertain surgical treatment.  Will see her back as needed

## 2024-04-10 ENCOUNTER — HOSPITAL ENCOUNTER (OUTPATIENT)
Dept: GENERAL RADIOLOGY | Facility: HOSPITAL | Age: 69
Discharge: HOME | End: 2024-04-10
Admitting: ORTHOPAEDIC SURGERY
Payer: MEDICARE

## 2024-04-10 DIAGNOSIS — M19.071 ARTHRITIS OF FIRST METATARSOPHALANGEAL (MTP) JOINT OF RIGHT FOOT: ICD-10-CM

## 2024-04-10 PROCEDURE — 25010000002 LIDOCAINE 1 % SOLUTION: Performed by: ORTHOPAEDIC SURGERY

## 2024-04-10 PROCEDURE — 25510000001 IOPAMIDOL 61 % SOLUTION: Performed by: ORTHOPAEDIC SURGERY

## 2024-04-10 PROCEDURE — 25010000002 BUPIVACAINE (PF) 0.25 % SOLUTION: Performed by: ORTHOPAEDIC SURGERY

## 2024-04-10 PROCEDURE — 25010000002 METHYLPREDNISOLONE PER 125 MG: Performed by: ORTHOPAEDIC SURGERY

## 2024-04-10 PROCEDURE — 77002 NEEDLE LOCALIZATION BY XRAY: CPT

## 2024-04-10 RX ORDER — LIDOCAINE HYDROCHLORIDE 10 MG/ML
20 INJECTION, SOLUTION INFILTRATION; PERINEURAL ONCE
Status: COMPLETED | OUTPATIENT
Start: 2024-04-10 | End: 2024-04-10

## 2024-04-10 RX ORDER — METHYLPREDNISOLONE SODIUM SUCCINATE 125 MG/2ML
80 INJECTION, POWDER, LYOPHILIZED, FOR SOLUTION INTRAMUSCULAR; INTRAVENOUS
Status: COMPLETED | OUTPATIENT
Start: 2024-04-10 | End: 2024-04-10

## 2024-04-10 RX ORDER — BUPIVACAINE HYDROCHLORIDE 2.5 MG/ML
10 INJECTION, SOLUTION EPIDURAL; INFILTRATION; INTRACAUDAL ONCE
Status: COMPLETED | OUTPATIENT
Start: 2024-04-10 | End: 2024-04-10

## 2024-04-10 RX ADMIN — LIDOCAINE HYDROCHLORIDE 0.5 ML: 10 INJECTION, SOLUTION INFILTRATION; PERINEURAL at 07:38

## 2024-04-10 RX ADMIN — IOPAMIDOL 0.5 ML: 612 INJECTION, SOLUTION INTRAVENOUS at 07:38

## 2024-04-10 RX ADMIN — BUPIVACAINE HYDROCHLORIDE 2 ML: 2.5 INJECTION, SOLUTION EPIDURAL; INFILTRATION; INTRACAUDAL; PERINEURAL at 07:38

## 2024-04-10 RX ADMIN — METHYLPREDNISOLONE SODIUM SUCCINATE 80 MG: 125 INJECTION, POWDER, LYOPHILIZED, FOR SOLUTION INTRAMUSCULAR; INTRAVENOUS at 07:38

## 2024-07-25 DIAGNOSIS — I10 PRIMARY HYPERTENSION: Chronic | ICD-10-CM

## 2024-07-25 RX ORDER — LOSARTAN POTASSIUM 25 MG/1
25 TABLET ORAL DAILY
Qty: 90 TABLET | Refills: 1 | Status: SHIPPED | OUTPATIENT
Start: 2024-07-25

## 2024-07-26 ENCOUNTER — TELEPHONE (OUTPATIENT)
Dept: ORTHOPEDIC SURGERY | Facility: CLINIC | Age: 69
End: 2024-07-26
Payer: MEDICARE

## 2024-07-26 DIAGNOSIS — M19.071 ARTHRITIS OF FIRST METATARSOPHALANGEAL (MTP) JOINT OF RIGHT FOOT: Primary | ICD-10-CM

## 2024-07-26 NOTE — TELEPHONE ENCOUNTER
Caller: Stacey Meyer    Relationship to patient: Self    Best call back number: [phone]  Type of visit: RIGHT FOOT INJECTION     Requested date: SOMETIME IN AUGUST      If rescheduling, when is the original appointment: NA     Additional notes:NA

## 2024-07-26 NOTE — TELEPHONE ENCOUNTER
I spoke with patient she had injection done in her first MTP joint a little over 3 months ago.  Starting to wear off and she can read go back to school and request repeat injection which I feel is reasonable.  Will put in orders for this and she was instructed that when this was starts wearing off she will need to schedule appointment to be seen in the office for repeat examination and x-rays prior to any further injections of which she voiced clear understanding.

## 2024-07-29 ENCOUNTER — OFFICE VISIT (OUTPATIENT)
Dept: INTERNAL MEDICINE | Facility: CLINIC | Age: 69
End: 2024-07-29
Payer: MEDICARE

## 2024-07-29 VITALS
OXYGEN SATURATION: 99 % | WEIGHT: 106 LBS | HEIGHT: 60 IN | SYSTOLIC BLOOD PRESSURE: 122 MMHG | HEART RATE: 57 BPM | DIASTOLIC BLOOD PRESSURE: 78 MMHG | BODY MASS INDEX: 20.81 KG/M2

## 2024-07-29 DIAGNOSIS — E03.9 ACQUIRED HYPOTHYROIDISM: Chronic | ICD-10-CM

## 2024-07-29 DIAGNOSIS — I10 PRIMARY HYPERTENSION: Chronic | ICD-10-CM

## 2024-07-29 DIAGNOSIS — M06.09 RHEUMATOID ARTHRITIS OF MULTIPLE SITES WITH NEGATIVE RHEUMATOID FACTOR: Chronic | ICD-10-CM

## 2024-07-29 DIAGNOSIS — E78.2 MIXED HYPERLIPIDEMIA: ICD-10-CM

## 2024-07-29 DIAGNOSIS — Z00.00 MEDICARE ANNUAL WELLNESS VISIT, SUBSEQUENT: Primary | ICD-10-CM

## 2024-07-29 LAB
ALBUMIN SERPL-MCNC: 4.9 G/DL (ref 3.5–5.2)
ALBUMIN/GLOB SERPL: 2.3 G/DL
ALP SERPL-CCNC: 64 U/L (ref 39–117)
ALT SERPL-CCNC: 16 U/L (ref 1–33)
AST SERPL-CCNC: 29 U/L (ref 1–32)
BILIRUB SERPL-MCNC: 0.5 MG/DL (ref 0–1.2)
BUN SERPL-MCNC: 14 MG/DL (ref 8–23)
BUN/CREAT SERPL: 16.9 (ref 7–25)
CALCIUM SERPL-MCNC: 10.7 MG/DL (ref 8.6–10.5)
CHLORIDE SERPL-SCNC: 100 MMOL/L (ref 98–107)
CHOLEST SERPL-MCNC: 221 MG/DL (ref 0–200)
CO2 SERPL-SCNC: 28.1 MMOL/L (ref 22–29)
CREAT SERPL-MCNC: 0.83 MG/DL (ref 0.57–1)
EGFRCR SERPLBLD CKD-EPI 2021: 77.9 ML/MIN/1.73
ERYTHROCYTE [DISTWIDTH] IN BLOOD BY AUTOMATED COUNT: 13.2 % (ref 12.3–15.4)
GLOBULIN SER CALC-MCNC: 2.1 GM/DL
GLUCOSE SERPL-MCNC: 89 MG/DL (ref 65–99)
HCT VFR BLD AUTO: 44.2 % (ref 34–46.6)
HDLC SERPL-MCNC: 72 MG/DL (ref 40–60)
HGB BLD-MCNC: 14.2 G/DL (ref 12–15.9)
LDLC SERPL CALC-MCNC: 134 MG/DL (ref 0–100)
LDLC/HDLC SERPL: 1.83 {RATIO}
MCH RBC QN AUTO: 29.7 PG (ref 26.6–33)
MCHC RBC AUTO-ENTMCNC: 32.1 G/DL (ref 31.5–35.7)
MCV RBC AUTO: 92.5 FL (ref 79–97)
PLATELET # BLD AUTO: 397 10*3/MM3 (ref 140–450)
POTASSIUM SERPL-SCNC: 4.8 MMOL/L (ref 3.5–5.2)
PROT SERPL-MCNC: 7 G/DL (ref 6–8.5)
RBC # BLD AUTO: 4.78 10*6/MM3 (ref 3.77–5.28)
SODIUM SERPL-SCNC: 138 MMOL/L (ref 136–145)
TRIGL SERPL-MCNC: 88 MG/DL (ref 0–150)
TSH SERPL DL<=0.005 MIU/L-ACNC: 2.03 UIU/ML (ref 0.27–4.2)
VIT B12 SERPL-MCNC: 404 PG/ML (ref 211–946)
VLDLC SERPL CALC-MCNC: 15 MG/DL (ref 5–40)
WBC # BLD AUTO: 4.76 10*3/MM3 (ref 3.4–10.8)

## 2024-07-29 PROCEDURE — 1159F MED LIST DOCD IN RCRD: CPT | Performed by: NURSE PRACTITIONER

## 2024-07-29 PROCEDURE — 1160F RVW MEDS BY RX/DR IN RCRD: CPT | Performed by: NURSE PRACTITIONER

## 2024-07-29 PROCEDURE — G0439 PPPS, SUBSEQ VISIT: HCPCS | Performed by: NURSE PRACTITIONER

## 2024-07-29 PROCEDURE — 3074F SYST BP LT 130 MM HG: CPT | Performed by: NURSE PRACTITIONER

## 2024-07-29 PROCEDURE — 99214 OFFICE O/P EST MOD 30 MIN: CPT | Performed by: NURSE PRACTITIONER

## 2024-07-29 PROCEDURE — 3078F DIAST BP <80 MM HG: CPT | Performed by: NURSE PRACTITIONER

## 2024-07-29 PROCEDURE — 1126F AMNT PAIN NOTED NONE PRSNT: CPT | Performed by: NURSE PRACTITIONER

## 2024-07-29 NOTE — PROGRESS NOTES
The ABCs of the Annual Wellness Visit  Subsequent Medicare Wellness Visit    Subjective    Stacey Meyer is a 68 y.o. female who presents for a Subsequent Medicare Wellness Visit.    The following portions of the patient's history were reviewed and   updated as appropriate: allergies, current medications, past family history, past medical history, past social history, past surgical history, and problem list.    Wt Readings from Last 4 Encounters:   07/29/24 48.1 kg (106 lb)   01/30/24 48.5 kg (107 lb)   12/22/23 48.5 kg (107 lb)   10/16/23 48.5 kg (107 lb)       Weight trend is stable.    Her cardiovascular risks are:    [] No Known risk factors  [] Known CAD and being treated     [x] Hypertension    [] Hyperlipidemia  [] Diabetes     [] Obesity  [] Family history    [] Current or hx tobacco use  [] Sedentary lifestyle       Male:   [] Testosterone use     Mobility    [x] Ambulates independently  [] Ambulates with cane   [] Ambulates with quad cane  [] Ambulates with rollator   [] Ambulates with walker   [] Mobile with wheelchair   [] Mobile with electric wheelchair     Continence    [x] Continent of bowel and bladder  [] Incontinent bowel and bladder   [] Incontinent bladder   [] Incontinent bowel      Colonoscopy scheduled for 10/18/24: Dr Costa     Social     Son in law, Shaun: waiting for heart transplant  Her adopted son, Kenrick, passed, unexpectedly.  She went back to work 2 wks later to get back active.   She is seeing grief counselor.    She has brain fog: states has gotten better.   Does not want to go back on lexapro.     Continues to exercise and eat well.     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   Sleeping better.     Compared to one year ago, the patient feels her physical   health is the same.    Compared to one year ago, the patient feels her mental   health is the same.    Recent Hospitalizations:  She was not admitted to the hospital during the last year.        Current Medical Providers:  Patient Care Team:  Kayli Tucker III, NP-C as PCP - General (Family Medicine)  Danny Costa MD as Consulting Physician (Gastroenterology)  Katie Howell MD as Consulting Physician (Obstetrics and Gynecology)  Alessandro Galeano MD as Consulting Physician (Rheumatology)    Outpatient Medications Prior to Visit   Medication Sig Dispense Refill    hydroxychloroquine (PLAQUENIL) 200 MG tablet Take 1 tablet by mouth Every Other Day.      levothyroxine (SYNTHROID, LEVOTHROID) 112 MCG tablet Take 1 tablet by mouth Daily. 90 tablet 1    losartan (COZAAR) 25 MG tablet TAKE 1 TABLET BY MOUTH EVERY DAY 90 tablet 1    traZODone (DESYREL) 50 MG tablet Take 1 tablet by mouth Every Night. For insomnia 7 tablet 0     No facility-administered medications prior to visit.       No opioid medication identified on active medication list. I have reviewed chart for other potential  high risk medication/s and harmful drug interactions in the elderly.        Aspirin is not on active medication list.  Aspirin use is not indicated based on review of current medical condition/s. Risk of harm outweighs potential benefits.  .    Patient Active Problem List   Diagnosis    Degeneration of lumbar intervertebral disc    Degeneration, intervertebral disc, thoracic    Fibromyalgia    Transient insomnia    Primary osteoarthritis of right hip    Familial ligamentous laxity    Hypertension    OA (osteoarthritis) of hip    Acquired hypothyroidism    Rheumatoid arthritis of multiple sites with negative rheumatoid factor    Gigantomastia    Erosive osteoarthritis    Depression with anxiety    Mixed hyperlipidemia    Arthritis of foot    Closed nondisplaced fracture of fifth right metatarsal bone    Arthritis of first metatarsophalangeal (MTP) joint of right foot    Blurry vision     Advance Care Planning  (Click this link to access ACP Navigator)      Advance Directive is on file.  ACP discussion was  "held with the patient during this visit. Patient has an advance directive in EMR which is still valid.      Objective    Vitals:    24 1021   BP: 122/78   BP Location: Left arm   Patient Position: Sitting   Cuff Size: Adult   Pulse: 57   SpO2: 99%   Weight: 48.1 kg (106 lb)   Height: 152.4 cm (60\")   PainSc: 0-No pain     Estimated body mass index is 20.7 kg/m² as calculated from the following:    Height as of this encounter: 152.4 cm (60\").    Weight as of this encounter: 48.1 kg (106 lb).    BMI is within normal parameters. No other follow-up for BMI required.    Jump to [facility] Fall Risk Flowsheet  Gait and Balance Evaluation:  Normal    Does the patient have evidence of cognitive impairment? No          HEALTH RISK ASSESSMENT    Smoking Status:  Social History     Tobacco Use   Smoking Status Former    Current packs/day: 0.00    Average packs/day: 1 pack/day for 10.0 years (10.0 ttl pk-yrs)    Types: Cigarettes    Start date: 1976    Quit date: 1986    Years since quittin.6   Smokeless Tobacco Never     Alcohol Consumption:  Social History     Substance and Sexual Activity   Alcohol Use Not Currently    Comment: occasional etoh     Fall Risk Screen:    STEADI Fall Risk Assessment was completed, and patient is at LOW risk for falls.Assessment completed on:2024    Depression Screenin/29/2024    10:21 AM   PHQ-2/PHQ-9 Depression Screening   Little Interest or Pleasure in Doing Things 0-->not at all   Feeling Down, Depressed or Hopeless 0-->not at all   PHQ-9: Brief Depression Severity Measure Score 0       Health Habits and Functional and Cognitive Screenin/22/2024     7:35 AM   Functional & Cognitive Status   Do you have difficulty preparing food and eating? No   Do you have difficulty bathing yourself, getting dressed or grooming yourself? No   Do you have difficulty using the toilet? No   Do you have difficulty moving around from place to place? No   Do you have trouble " with steps or getting out of a bed or a chair? No   Current Diet Well Balanced Diet   Dental Exam Up to date   Eye Exam Up to date   Exercise (times per week) 4 times per week   Current Exercises Include Home Exercise Program (TV, Computer, Etc.);Pickleball;Stair Step Machine;Walking   Do you need help using the phone?  No   Are you deaf or do you have serious difficulty hearing?  No   Do you need help to go to places out of walking distance? No   Do you need help shopping? No   Do you need help preparing meals?  No   Do you need help with housework?  No   Do you need help with laundry? No   Do you need help taking your medications? No   Do you need help managing money? No   Do you ever drive or ride in a car without wearing a seat belt? No   Have you felt unusual stress, anger or loneliness in the last month? No   Who do you live with? Spouse   If you need help, do you have trouble finding someone available to you? No   Have you been bothered in the last four weeks by sexual problems? No   Do you have difficulty concentrating, remembering or making decisions? No       Age-appropriate Screening Schedule:  Refer to the list below for future screening recommendations based on patient's age, sex and/or medical conditions. Orders for these recommended tests are listed in the plan section. The patient has been provided with a written plan.    Health Maintenance   Topic Date Due    PAP SMEAR  06/08/2023    COVID-19 Vaccine (11 - 2023-24 season) 02/13/2024    COLORECTAL CANCER SCREENING  06/26/2024    ANNUAL WELLNESS VISIT  07/26/2024    LIPID PANEL  08/16/2024    INFLUENZA VACCINE  08/01/2024    DXA SCAN  06/01/2025    MAMMOGRAM  06/26/2025    TDAP/TD VACCINES (2 - Td or Tdap) 05/01/2027    HEPATITIS C SCREENING  Completed    Pneumococcal Vaccine 65+  Completed    ZOSTER VACCINE  Completed                CMS Preventative Services Quick Reference  Risk Factors Identified During Encounter  Immunizations  "Discussed/Encouraged: COVID19      The above risks/problems have been discussed with the patient.  Pertinent information has been shared with the patient in the After Visit Summary.  An After Visit Summary and PPPS were made available to the patient.    Follow Up:   Next Medicare Wellness visit to be scheduled in 1 year.       Additional E&M Note during same encounter follows:  Patient has multiple medical problems which are significant and separately identifiable that require additional work above and beyond the Medicare Wellness Visit.      Chief Complaint  Medicare Wellness-subsequent    Subjective        Stacey Meyer is also being seen today for AWV and follow up chronic conditions:     1) hypothyroid: chronic.  Continues synthroid    2) RA: chronic and continues plaquenil      Followed by rheumatology    3) hypertension: had been off medication, but restarted last year.      4) RLS - at times       8/16/23: left posterior vitreous detachment  States she does have long haired cat since her son passed.   Pataday - advised she can also try zaditor   Dr Wahl: [facility]      She had annual with [facility] 2/23/22: Katie Howell.  Note reviewed.     States will have GYN appt in the fall.     HPI      Review of Systems     Objective   Vital Signs:  /78 (BP Location: Left arm, Patient Position: Sitting, Cuff Size: Adult)   Pulse 57   Ht 152.4 cm (60\")   Wt 48.1 kg (106 lb)   SpO2 99%   BMI 20.70 kg/m²     Physical Exam  Vitals reviewed.   Constitutional:       Appearance: Normal appearance. She is well-developed.      Comments: Appears euthyroid    Neck:      Thyroid: No thyromegaly.   Cardiovascular:      Rate and Rhythm: Normal rate and regular rhythm.      Pulses: Normal pulses.      Heart sounds: Normal heart sounds.   Pulmonary:      Effort: Pulmonary effort is normal.      Breath sounds: Normal breath sounds.      Comments: E/U   Abdominal:      General: Bowel sounds are normal. "   Musculoskeletal:         General: Normal range of motion.      Right lower leg: No edema.      Left lower leg: No edema.   Skin:     General: Skin is warm and dry.      Capillary Refill: Capillary refill takes 2 to 3 seconds.   Neurological:      Mental Status: She is alert and oriented to person, place, and time.   Psychiatric:         Mood and Affect: Mood normal.         Behavior: Behavior is cooperative.          The following data was reviewed by: Kayli Tucker III, NP-C on 07/29/2024:  Common labs          8/16/2023    08:14 8/16/2023    08:33 1/30/2024    09:21   Common Labs   Glucose 101   85    BUN 15   15    Creatinine 0.65  0.60  0.77    Sodium 140   138    Potassium 4.4   4.6    Chloride 98   98    Calcium 10.4   10.0    Total Protein   6.5    Albumin 4.9   4.6    Total Bilirubin 1.4   0.6    Alkaline Phosphatase 70   73    AST (SGOT) 31   36    ALT (SGPT) 22   33    WBC 4.50      Hemoglobin 14.5      Hematocrit 43.7      Platelets 397      Total Cholesterol 254      Triglycerides 83      HDL Cholesterol 88      LDL Cholesterol  152      Hemoglobin A1C 5.20                   Assessment and Plan     Problem List Items Addressed This Visit          Cardiac and Vasculature    Hypertension (Chronic)    Overview     Prior treatment:  Avalide, losartan   Current treatment:  losartan          Current Assessment & Plan     Hypertension is improving with treatment.  Medication changes per orders.  Blood pressure will be reassessed at the next regular appointment.  Continue losartan 25 mg daily.            Relevant Orders    Comprehensive Metabolic Panel    Lipid Panel With LDL / HDL Ratio    CBC (No Diff)    Mixed hyperlipidemia    Relevant Orders    Lipid Panel With LDL / HDL Ratio       Endocrine and Metabolic    Acquired hypothyroidism (Chronic)    Current Assessment & Plan     Stable   Check lab for ongoing therapeutic drug monitoring    Continue synthroid: adjust if needed base on labs           Relevant Orders    TSH Rfx On Abnormal To Free T4    Vitamin B12       Musculoskeletal and Injuries    Rheumatoid arthritis of multiple sites with negative rheumatoid factor (Chronic)    Overview     Seronegative RA - has been seen by Dr Froylan Torres 2020: no improvement, discontinued     Current treatment: plaquenil            Relevant Orders    Vitamin B12     Other Visit Diagnoses       Medicare annual wellness visit, subsequent    -  Primary                      Follow Up     Return in about 6 months (around 1/29/2025).    Patient was given instructions and counseling regarding her condition or for health maintenance advice. Please see specific information pulled into the AVS if appropriate.

## 2024-07-29 NOTE — ASSESSMENT & PLAN NOTE
Hypertension is improving with treatment.  Medication changes per orders.  Blood pressure will be reassessed at the next regular appointment.  Continue losartan 25 mg daily.

## 2024-07-29 NOTE — ASSESSMENT & PLAN NOTE
Stable   Check lab for ongoing therapeutic drug monitoring    Continue synthroid: adjust if needed base on labs

## 2024-07-29 NOTE — PATIENT INSTRUCTIONS
Medicare Wellness  Personal Prevention Plan of Service     Date of Office Visit:    Encounter Provider:  Kayli Tucker III, NP-C  Place of Service:  [facility]  Patient Name: Stacey Meyer  :  1955    As part of the Medicare Wellness portion of your visit today, we are providing you with this personalized preventive plan of services (PPPS). This plan is based upon recommendations of the [place] Preventive Services Task Force (USPSTF) and the Advisory Committee on Immunization Practices (ACIP).    This lists the preventive care services that should be considered, and provides dates of when you are due. Items listed as completed are up-to-date and do not require any further intervention.    Health Maintenance   Topic Date Due    PAP SMEAR  2023    COVID-19 Vaccine ( season) 2024    COLORECTAL CANCER SCREENING  2024    ANNUAL WELLNESS VISIT  2024    LIPID PANEL  2024    INFLUENZA VACCINE  2024    DXA SCAN  2025    MAMMOGRAM  2025    TDAP/TD VACCINES (2 - Td or Tdap) 2027    HEPATITIS C SCREENING  Completed    Pneumococcal Vaccine 65+  Completed    ZOSTER VACCINE  Completed       No orders of the defined types were placed in this encounter.      Return in about 6 months (around 2025).

## 2024-08-22 ENCOUNTER — HOSPITAL ENCOUNTER (OUTPATIENT)
Dept: GENERAL RADIOLOGY | Facility: HOSPITAL | Age: 69
Discharge: HOME | End: 2024-08-22
Payer: MEDICARE

## 2024-08-22 DIAGNOSIS — M19.071 ARTHRITIS OF FIRST METATARSOPHALANGEAL (MTP) JOINT OF RIGHT FOOT: ICD-10-CM

## 2024-08-22 PROCEDURE — 25010000002 LIDOCAINE 1 % SOLUTION: Performed by: ORTHOPAEDIC SURGERY

## 2024-08-22 PROCEDURE — 25510000001 IOPAMIDOL 61 % SOLUTION: Performed by: ORTHOPAEDIC SURGERY

## 2024-08-22 PROCEDURE — 25010000002 BUPIVACAINE (PF) 0.25 % SOLUTION: Performed by: ORTHOPAEDIC SURGERY

## 2024-08-22 PROCEDURE — 25010000002 METHYLPREDNISOLONE PER 40 MG: Performed by: ORTHOPAEDIC SURGERY

## 2024-08-22 PROCEDURE — 77002 NEEDLE LOCALIZATION BY XRAY: CPT

## 2024-08-22 RX ORDER — BUPIVACAINE HYDROCHLORIDE 2.5 MG/ML
10 INJECTION, SOLUTION EPIDURAL; INFILTRATION; INTRACAUDAL ONCE
Status: COMPLETED | OUTPATIENT
Start: 2024-08-22 | End: 2024-08-22

## 2024-08-22 RX ORDER — METHYLPREDNISOLONE SODIUM SUCCINATE 40 MG/ML
40 INJECTION, POWDER, LYOPHILIZED, FOR SOLUTION INTRAMUSCULAR; INTRAVENOUS
Status: COMPLETED | OUTPATIENT
Start: 2024-08-22 | End: 2024-08-22

## 2024-08-22 RX ORDER — LIDOCAINE HYDROCHLORIDE 10 MG/ML
10 INJECTION, SOLUTION INFILTRATION; PERINEURAL ONCE
Status: COMPLETED | OUTPATIENT
Start: 2024-08-22 | End: 2024-08-22

## 2024-08-22 RX ADMIN — LIDOCAINE HYDROCHLORIDE 0.5 ML: 10 INJECTION, SOLUTION INFILTRATION; PERINEURAL at 08:57

## 2024-08-22 RX ADMIN — BUPIVACAINE HYDROCHLORIDE 1 ML: 2.5 INJECTION, SOLUTION EPIDURAL; INFILTRATION; INTRACAUDAL; PERINEURAL at 08:57

## 2024-08-22 RX ADMIN — IOPAMIDOL 0.5 ML: 612 INJECTION, SOLUTION INTRAVENOUS at 08:57

## 2024-08-22 RX ADMIN — METHYLPREDNISOLONE SODIUM SUCCINATE 40 MG: 40 INJECTION, POWDER, FOR SOLUTION INTRAMUSCULAR; INTRAVENOUS at 08:57

## 2024-09-04 ENCOUNTER — OFFICE VISIT (OUTPATIENT)
Dept: ORTHOPEDIC SURGERY | Facility: CLINIC | Age: 69
End: 2024-09-04
Payer: MEDICARE

## 2024-09-04 VITALS — HEIGHT: 60 IN | WEIGHT: 107.7 LBS | BODY MASS INDEX: 21.14 KG/M2 | TEMPERATURE: 98.3 F

## 2024-09-04 DIAGNOSIS — M75.41 IMPINGEMENT SYNDROME OF RIGHT SHOULDER: Primary | ICD-10-CM

## 2024-09-04 RX ORDER — METHYLPREDNISOLONE ACETATE 80 MG/ML
80 INJECTION, SUSPENSION INTRA-ARTICULAR; INTRALESIONAL; INTRAMUSCULAR; SOFT TISSUE
Status: COMPLETED | OUTPATIENT
Start: 2024-09-04 | End: 2024-09-04

## 2024-09-04 RX ORDER — LIDOCAINE HYDROCHLORIDE 10 MG/ML
2 INJECTION, SOLUTION EPIDURAL; INFILTRATION; INTRACAUDAL; PERINEURAL
Status: COMPLETED | OUTPATIENT
Start: 2024-09-04 | End: 2024-09-04

## 2024-09-04 RX ADMIN — LIDOCAINE HYDROCHLORIDE 2 ML: 10 INJECTION, SOLUTION EPIDURAL; INFILTRATION; INTRACAUDAL; PERINEURAL at 08:03

## 2024-09-04 RX ADMIN — METHYLPREDNISOLONE ACETATE 80 MG: 80 INJECTION, SUSPENSION INTRA-ARTICULAR; INTRALESIONAL; INTRAMUSCULAR; SOFT TISSUE at 08:03

## 2024-09-04 NOTE — PROGRESS NOTES
Patient: Stacey Meyer  YOB: 1955  Date of Service: 9/4/2024    Chief Complaints: Right shoulder pain    Subjective:    History of Present Illness: Pt is seen in the office today with complaints of right shoulder pain has been almost a year since have seen her at that time we thought this was more impingement we injected her she saw physical therapy she did great she states she was doing well till recently she was lifting some chairs at school and said it was quite difficult to do.  No real new history injury or change in activity that she can recall        Allergies:   Allergies   Allergen Reactions    Erythromycin Rash    Metronidazole Rash and Swelling    Oxycodone-Aspirin Nausea And Vomiting and Nausea Only    Sulfa Antibiotics Rash    Tetracycline Rash    Tetracyclines & Related Rash       Medications:   Home Medications:  Current Outpatient Medications on File Prior to Visit   Medication Sig    hydroxychloroquine (PLAQUENIL) 200 MG tablet Take 1 tablet by mouth Every Other Day.    levothyroxine (SYNTHROID, LEVOTHROID) 112 MCG tablet Take 1 tablet by mouth Daily.    losartan (COZAAR) 25 MG tablet TAKE 1 TABLET BY MOUTH EVERY DAY     No current facility-administered medications on file prior to visit.     Current Medications:  Scheduled Meds:  Continuous Infusions:No current facility-administered medications for this visit.    PRN Meds:.    I have reviewed the patient's medical history in detail and updated the computerized patient record.  Review and summarization of old records include:    Past Medical History:   Diagnosis Date    Allergic ?    percadan sulfa emycin tetriccyclin flagel    Anxiety     Closed nondisplaced fracture of fifth right metatarsal bone 07/27/2023    Degeneration, intervertebral disc, thoracic     Depression     Depression 06/04/2016    Erosive osteoarthritis     Dr Galeano    History of peptic ulcer     History of transfusion     AFTER HIP REPLACEMENT      "Hypertension     Hypothyroidism many years    Knee swelling 2009    OA (osteoarthritis) of hip     Osteoarthritis     PONV (postoperative nausea and vomiting)     Primary osteoarthritis of right hip     Rheumatoid arthritis     Thyroid disease     HYPOTHYROID    Visual impairment readers        Past Surgical History:   Procedure Laterality Date    BILATERAL BREAST REDUCTION Bilateral 2018    Procedure: BREAST REDUCTION BILATERAL, NEW IMAGE;  Surgeon: Myranda Perea MD;  Location: [facility] MAIN OR;  Service: New Image    BREAST SURGERY  2018    BUNIONECTOMY Left     COLONOSCOPY      2009    COLONOSCOPY N/A 2019    Procedure: COLONOSCOPY WITH COLD POLYPECTOMY x 2;  Surgeon: Danny Costa MD;  Location: [facility] ENDOSCOPY;  Service: Gastroenterology    ECTOPIC PREGNANCY SURGERY Bilateral     2 SEPARATE SURGERIES    ENDOSCOPY N/A 2016    Procedure: ESOPHAGOGASTRODUODENOSCOPY WITH COLD BIOPSIES;  Surgeon: Danny Costa MD;  Location: [facility] ENDOSCOPY;  Service:     ENDOSCOPY N/A 2016    Procedure: ESOPHAGOGASTRODUODENOSCOPY;  Surgeon: Danny Costa MD;  Location: [facility] ENDOSCOPY;  Service:     FERTILITY SURGERY      FOOT SURGERY Bilateral     \"THEY BROKE MY 5TH TOE ON BOTH SIDES AND STRIGHTENED THEM\"     HAMMER TOE REPAIR Bilateral     HAND SURGERY  2021    left hand     JOINT REPLACEMENT Right 07/15/2020    hand    TONSILLECTOMY      TOTAL HIP ARTHROPLASTY Right 2016    Procedure: RIGHT TOTAL HIP ARTHROPLASTY ANTERIOR APPROACH;  Surgeon: Eric Clayton MD;  Location: [facility] MAIN OR;  Service:         Social History     Occupational History    Occupation: teacher   Tobacco Use    Smoking status: Former     Current packs/day: 0.00     Average packs/day: 1 pack/day for 10.0 years (10.0 ttl pk-yrs)     Types: Cigarettes     Start date: 1976     Quit date: 1986     Years since quittin.7    Smokeless tobacco: Never   Vaping Use    Vaping status: Never Used "   Substance and Sexual Activity    Alcohol use: Not Currently     Comment: occasional etoh    Drug use: No    Sexual activity: Not Currently     Partners: Male     Birth control/protection: None      Social History     Social History Narrative    Not on file        Family History   Problem Relation Age of Onset    Liver disease Mother          at 69 yoa     Autoimmune disease Mother     Stroke Maternal Aunt     Obesity Brother     Malfercho Hyperthermia Neg Hx        ROS: 14 point review of systems was performed and was negative except for documented findings in HPI and today's encounter.     Allergies:   Allergies   Allergen Reactions    Erythromycin Rash    Metronidazole Rash and Swelling    Oxycodone-Aspirin Nausea And Vomiting and Nausea Only    Sulfa Antibiotics Rash    Tetracycline Rash    Tetracyclines & Related Rash     Constitutional:  Denies fever, shaking or chills   Eyes:  Denies change in visual acuity   HENT:  Denies nasal congestion or sore throat   Respiratory:  Denies cough or shortness of breath   Cardiovascular:  Denies chest pain or severe LE edema   GI:  Denies abdominal pain, nausea, vomiting, bloody stools or diarrhea   Musculoskeletal:  Numbness, tingling, or loss of motor function only as noted above in history of present illness.  : Denies painful urination or hematuria  Integument:  Denies rash, lesion or ulceration   Neurologic:  Denies headache or focal weakness  Endocrine:  Denies lymphadenopathy  Psych:  Denies confusion or change in mental status   Hem:  Denies active bleeding      Physical Exam: 69 y.o. female  Wt Readings from Last 3 Encounters:   24 48.9 kg (107 lb 11.2 oz)   24 48.1 kg (106 lb)   24 48.5 kg (107 lb)       Body mass index is 21.03 kg/m².    Vitals:    24 0800   Temp: 98.3 °F (36.8 °C)     Vital signs reviewed.   General Appearance:    Alert, cooperative, in no acute distress                    Ortho exam  Physical exam of the right  shoulder reveals no overlying skin changes no lymphedema no lymphadenopathy.  Patient has active flexion 180 with mild symptoms abduction is similar external rotation is to 50 and internal rotation to the upper lumbar spine with mild symptoms.  Patient has good rotator cuff strength 4+ over 5 with isometric strength testing with pain.  Patient has a positive impingement and a positive Qureshi sign.  Patient has good cervical range of motion which is full and asymptomatic no radicular symptoms.  Patient has a normal elbow exam.  Good distal pulses are present  Patient has pain with overhead activity and a positive Neer sign and a positive empty can sign , a positive drop arm and a definitive painful arc         I did review x-rays from October last year she has some sclerosis at the insertion of the cuff no obvious acute pathology      Assessment: Right shoulder pain I still think this is more impingement I think an injection is quite reasonable and then have her get back to [facility] and just reiterate the exercises to do in the unlikely event she fails this would consider other means of testing    Plan:   Follow up as indicated.  Ice, elevate, and rest as needed.  Discussed conservative measures of pain control including ice, bracing.  Also talked about the importance of strengthening and maintaining ideal body weight    Laisha Linares M.D.    Large Joint Arthrocentesis: R subacromial bursa  Date/Time: 9/4/2024 8:03 AM  Consent given by: patient  Site marked: site marked  Timeout: Immediately prior to procedure a time out was called to verify the correct patient, procedure, equipment, support staff and site/side marked as required   Supporting Documentation  Indications: pain   Procedure Details  Location: shoulder - R subacromial bursa  Preparation: Patient was prepped and draped in the usual sterile fashion  Needle gauge: 21G.  Approach: posterior  Medications administered: 80 mg methylPREDNISolone acetate 80 MG/ML; 2 mL  lidocaine PF 1% 1 %  Patient tolerance: patient tolerated the procedure well with no immediate complications

## 2024-10-02 ENCOUNTER — OFFICE VISIT (OUTPATIENT)
Dept: ORTHOPEDIC SURGERY | Facility: CLINIC | Age: 69
End: 2024-10-02
Payer: MEDICARE

## 2024-10-02 VITALS — TEMPERATURE: 97 F | BODY MASS INDEX: 21.05 KG/M2 | HEIGHT: 60 IN | WEIGHT: 107.2 LBS

## 2024-10-02 DIAGNOSIS — M19.071 ARTHRITIS OF FIRST METATARSOPHALANGEAL (MTP) JOINT OF RIGHT FOOT: ICD-10-CM

## 2024-10-02 DIAGNOSIS — R52 PAIN: Primary | ICD-10-CM

## 2024-10-02 PROCEDURE — 99213 OFFICE O/P EST LOW 20 MIN: CPT | Performed by: ORTHOPAEDIC SURGERY

## 2024-10-02 PROCEDURE — 73630 X-RAY EXAM OF FOOT: CPT | Performed by: ORTHOPAEDIC SURGERY

## 2024-10-02 NOTE — PROGRESS NOTES
"Foot Follow Up      Patient: Stacey Meyer    YOB: 1955 69 y.o. female    Chief Complaints: Toe gets sore    History of Present Illness: Patient was seen initially on 7/27/2023 with reporting history of rheumatoid arthritis and reported a history of chronic pain in her right first MTP joint that shoots into her toe.  She had been followed by Dr. Harvey and had an injection done in the spring 2023 which gave her some initial relief with recurrence of symptoms.  She reported that she had some type of previous hammertoe procedures on the fourth and fifth also thought that she had an old fifth metatarsal fracture.  She had left bunion corrected many years ago.     She was felt to have arthritis of the first MTP joint with hallux rigidus as well as previous fourth and fifth hammertoe procedures and previous right fifth metatarsal fracture with asymptomatic shortening of the fifth ray.  Main thing that was bothering her was her first MTP joint.  We discussed hierarchy of treatment reviewed she may ultimately require fusion which she did not wish to proceed with and her  just had knee replacement by Dr. Clayton and her son-in-law was awaiting cardiac transplant.  We discussed nonoperative options and she was prescribed compounding cream and instructed on heel cord stretching exercises for the carbon fiber insert and sent for radiographic guided injection of the right first MTP joint.  Patient had radiographically guided injection of the right first MTP joint on 8/29/2023.      She was seen on 10/16/2023 stating that she remained much better and that the injection was \"a game changer \"and had made a \"night and day\" difference..  She was even actually able to wear high heels at a wedding.  She did not feel that the topical helped much.  Current pain was rated 0 out of 10.    We discussed treatment at that time and she did not wish to pursue surgical treatment with other issues that she had going on but " "understood that fusion could be a more definitive treatment if symptoms came refractory to injection.  She was instructed to continue with stretching and use compounding cream as needed.    I have spoken with the patient several times and she has had repeat injections done on 12/27/2023, 4/10/2024 and 8/22/2024.    Patient is seen back today reporting that she is does still have some discomfort in her right great toe but injections have made things more manageable.  She is not really been doing any heel cord stretching once symptoms have improved and previously the topical compounding cream had not really helped.  Pain is rated 3 out of 10  HPI    ROS: Foot pain  Past Medical History:   Diagnosis Date    Allergic ?    percadan sulfa emycin tetriccyclin flagel    Anxiety     Closed nondisplaced fracture of fifth right metatarsal bone 07/27/2023    Degeneration, intervertebral disc, thoracic     Depression     Depression 06/04/2016    Erosive osteoarthritis     Dr Galeano    History of peptic ulcer     History of transfusion     AFTER HIP REPLACEMENT     Hypertension     Hypothyroidism many years    Knee swelling 09/2009    OA (osteoarthritis) of hip     Osteoarthritis     PONV (postoperative nausea and vomiting)     Primary osteoarthritis of right hip     Rheumatoid arthritis     Thyroid disease     HYPOTHYROID    Visual impairment readers     Physical Exam:   Vitals:    10/02/24 0807   Temp: 97 °F (36.1 °C)   Weight: 48.6 kg (107 lb 3.2 oz)   Height: 152.4 cm (60\")   PainSc:   3   PainLoc: Foot     Well developed with normal mood.  On exam she has palpable arthritic change on the right first MTP joint.  She had 4 degrees dorsiflexion 20 Griese plantarflexion with mild to moderate discomfort.      Radiology: 3 views of the right foot ordered evaluate pain and alignment reviewed and compared to previous x-rays.  There is severe arthritis of the first metatarsal phalangeal joint which is progressed somewhat since " previous x-rays on 10/16/2023.  There remains healed fifth metatarsal fracture distally without change.      Assessment/Plan:   1.  Right first MTP arthritis with hallux rigidus  2.  Previous right fifth metatarsal fracture or possible osteotomy and fourth and fifth hammertoe procedures done elsewhere with asymptomatic shortening of the fifth ray.  We discussed treatment going forward and ultimately she may wish to proceed with fusion but she cannot do anything at this time as her son-in-law is currently awaiting cardiac transplant that may occur the next few months and she will need to take care of her grandchildren.    We discussed other measures and she would like to have another injection done sometime for the holidays and will call in November to have this scheduled.    We discussed other measures and she was instructed to resume heel cord stretching exercises and discussed etiology thereof.  This was again demonstrated for her and instruction sheet was provided.    Will also see about having Dana contact her about carbon fiber inserts.    At this point we can continue with injections every 3 to 4 months and then symptoms are refractory and she is able to do as such we will proceed with fusion.    We had a pleasant visit I will see her back as needed    She may call when she needs repeat injections.

## 2024-10-04 DIAGNOSIS — E03.9 ACQUIRED HYPOTHYROIDISM: Chronic | ICD-10-CM

## 2024-10-04 RX ORDER — LEVOTHYROXINE SODIUM 112 UG/1
112 TABLET ORAL DAILY
Qty: 90 TABLET | Refills: 1 | Status: SHIPPED | OUTPATIENT
Start: 2024-10-04

## 2024-10-07 ENCOUNTER — HOSPITAL ENCOUNTER (OUTPATIENT)
Dept: PHYSICAL THERAPY | Facility: HOSPITAL | Age: 69
Discharge: HOME | End: 2024-10-07
Admitting: ORTHOPAEDIC SURGERY
Payer: MEDICARE

## 2024-10-07 DIAGNOSIS — M25.511 RIGHT SHOULDER PAIN, UNSPECIFIED CHRONICITY: Primary | ICD-10-CM

## 2024-10-07 DIAGNOSIS — Z74.09 IMPAIRED MOBILITY: ICD-10-CM

## 2024-10-07 PROCEDURE — 97162 PT EVAL MOD COMPLEX 30 MIN: CPT | Performed by: PHYSICAL THERAPIST

## 2024-10-07 PROCEDURE — 97110 THERAPEUTIC EXERCISES: CPT | Performed by: PHYSICAL THERAPIST

## 2024-10-07 NOTE — THERAPY EVALUATION
Outpatient Physical Therapy Ortho Initial Evaluation  [facility]     Patient Name: Stacey Meyer  : 1955  MRN: [id number]  Today's Date: 10/7/2024      Visit Date: 10/07/2024    Patient Active Problem List   Diagnosis    Degeneration of lumbar intervertebral disc    Degeneration, intervertebral disc, thoracic    Fibromyalgia    Transient insomnia    Primary osteoarthritis of right hip    Familial ligamentous laxity    Hypertension    OA (osteoarthritis) of hip    Acquired hypothyroidism    Rheumatoid arthritis of multiple sites with negative rheumatoid factor    Gigantomastia    Erosive osteoarthritis    Depression with anxiety    Mixed hyperlipidemia    Arthritis of foot    Closed nondisplaced fracture of fifth right metatarsal bone    Arthritis of first metatarsophalangeal (MTP) joint of right foot    Blurry vision        Past Medical History:   Diagnosis Date    Allergic ?    percadan sulfa emycin tetriccyclin flagel    Anxiety     Closed nondisplaced fracture of fifth right metatarsal bone 2023    Degeneration, intervertebral disc, thoracic     Depression     Depression 2016    Erosive osteoarthritis     Dr Galeano    History of peptic ulcer     History of transfusion     AFTER HIP REPLACEMENT     Hypertension     Hypothyroidism many years    Knee swelling 2009    OA (osteoarthritis) of hip     Osteoarthritis     PONV (postoperative nausea and vomiting)     Primary osteoarthritis of right hip     Rheumatoid arthritis     Thyroid disease     HYPOTHYROID    Visual impairment readers        Past Surgical History:   Procedure Laterality Date    BILATERAL BREAST REDUCTION Bilateral 2018    Procedure: BREAST REDUCTION BILATERAL, NEW IMAGE;  Surgeon: Myranda Perea MD;  Location: [facility] OR;  Service: New Image    BREAST SURGERY  2018    BUNIONECTOMY Left     COLONOSCOPY      2009    COLONOSCOPY N/A 2019    Procedure: COLONOSCOPY WITH COLD POLYPECTOMY x 2;   "Surgeon: Danny Costa MD;  Location: [facility] ENDOSCOPY;  Service: Gastroenterology    ECTOPIC PREGNANCY SURGERY Bilateral     2 SEPARATE SURGERIES    ENDOSCOPY N/A 05/24/2016    Procedure: ESOPHAGOGASTRODUODENOSCOPY WITH COLD BIOPSIES;  Surgeon: Danny Costa MD;  Location: [facility] ENDOSCOPY;  Service:     ENDOSCOPY N/A 11/01/2016    Procedure: ESOPHAGOGASTRODUODENOSCOPY;  Surgeon: Danny Costa MD;  Location: [facility] ENDOSCOPY;  Service:     FERTILITY SURGERY      FOOT SURGERY Bilateral     \"THEY BROKE MY 5TH TOE ON BOTH SIDES AND STRIGHTENED THEM\"     HAMMER TOE REPAIR Bilateral     HAND SURGERY  04/28/2021    left hand     JOINT REPLACEMENT Right 07/15/2020    hand    TONSILLECTOMY      TOTAL HIP ARTHROPLASTY Right 07/13/2016    Procedure: RIGHT TOTAL HIP ARTHROPLASTY ANTERIOR APPROACH;  Surgeon: Eric Clayton MD;  Location: [facility] MAIN OR;  Service:        Visit Dx:     ICD-10-CM ICD-9-CM   1. Right shoulder pain, unspecified chronicity  M25.511 719.41   2. Impaired mobility  Z74.09 799.89          Patient History       Row Name 10/07/24 0700             History    Chief Complaint Difficulty with daily activities;Pain  -GJ      Type of Pain Shoulder pain  R  -GJ      Date Current Problem(s) Began --  3-4 weeks ago  -GJ      Brief Description of Current Complaint Ms. Meyer is a 68 y/o R handed female. She had been in therapy about a year ago for her R shoulder pain, which helped. She reports approximately 3-4 weeks ago started to experienced R shoulder pain, with no overt knowledge of YAMEL. Her condition is unchanging. Pain location R shoulder, AC/deltoid region. Pain is intermittent. Aggravating activities include lifting, carrying purse, driving car. Relieving activities include nothing really. Denies N/T BUE. Denies Sleep disturbance secondary to shoulder pain. She does report sleeping prone with arms above her head. Xray, see MD note. Previous treatments for this condition injection a few weeks ago, did " not seem to help. She is a pre-.  She does have RA.  -GJ      Previous treatment for THIS PROBLEM Injections  -GJ      Patient/Caregiver Goals Relieve pain;Improve mobility;Improve strength;Know what to do to help the symptoms  -GJ      Hand Dominance right-handed  -GJ      Occupation/sports/leisure activities , grand kids  -GJ      What clinical tests have you had for this problem? X-ray  -GJ      Results of Clinical Tests see MD note  -GJ      Are you or can you be pregnant No  -GJ         Pain     Pain Location Shoulder  R  -GJ      What Performance Factors Make the Current Problem(s) WORSE? ifting, carrying purse, driving car  -GJ      What Performance Factors Make the Current Problem(s) BETTER? nothing really  -GJ         Fall Risk Assessment    Any falls in the past year: No  -GJ         Daily Activities    Primary Language English  -GJ      Are you able to read Yes  -GJ      Are you able to write Yes  -GJ      How does patient learn best? Demonstration;Pictures/Video;Reading;Listening  -GJ      Teaching needs identified Home Exercise Program;Management of Condition  -GJ      Patient is concerned about/has problems with Grasping objects lifting;Performing home management (household chores, shopping, care of dependents);Performing job responsibilities/community activities (work, school,;Performing sports, recreation, and play activities;Reaching over head;Repetitive movements of the hand, arm, shoulder  -GJ      Barriers to learning None  -GJ      Pt Participated in POC and Goals Yes  -GJ                User Key  (r) = Recorded By, (t) = Taken By, (c) = Cosigned By      Initials Name Provider Type    GJ Vijay Munoz, PT Physical Therapist                     PT Ortho       Row Name 10/07/24 0700       Posture/Observations    Alignment Options Forward head;Cervical lordosis;Rounded shoulders;Scapular elevation;Scapular winging;Scoliosis  -GJ    Forward Head Mild  -GJ    Rounded  Shoulders Bilateral:;Moderate  -GJ    Scapular Elevation Right:;Mild  -GJ    Posture/Observations Comments Noted ulnar drift bilateral hands greater on the right than the left  -GJ       Special Tests/Palpation    Special Tests/Palpation Shoulder  -GJ       Shoulder Impingement/Rotator Cuff Special Tests    Qureshi-Avinash Test (RC Lesion vs. Bursitis) Right:;Positive  -GJ    Neer Impingement Test (RC Lesion vs. Bursitis) Right:;Positive  -GJ    Full Can Test (RC Lesion) Right:;Negative  painful but strong  -GJ    Empty Can Test (RC Lesion) Right:;Negative  painful but strong  -GJ       Shoulder Girdle Palpation    Shoulder Girdle Palpation? Yes  -GJ    Infraspinatus Right:;Tender;Guarded/taut;Trigger point  -GJ    Upper Trap Right:;Guarded/taut  -GJ    Levator Scapula Right:;Guarded/taut  -GJ       General ROM    Head/Neck/Trunk Neck Extension;Neck Flexion;Neck Lt Rotation;Neck Rt Rotation  -GJ    RT Upper Ext Rt Shoulder ABduction;Rt Shoulder Flexion;Rt Shoulder External Rotation;Rt Shoulder Internal Rotation  -GJ    LT Upper Ext Lt Shoulder ABduction;Lt Shoulder Flexion;Lt Shoulder External Rotation;Lt Shoulder Internal Rotation  -GJ    GENERAL ROM COMMENTS Grossly noted bilateral elbow and wrist active range of motion symmetrical within functional limits and noncontributory  -GJ       Head/Neck/Trunk    Neck Extension AROM WFL  -GJ    Neck Flexion AROM WFL  -GJ    Neck Lt Rotation AROM WFL  -GJ    Neck Rt Rotation AROM WFL  -GJ       Right Upper Ext    Rt Shoulder Abduction AROM 160 seated, painful  -GJ    Rt Shoulder Abduction PROM 175 supine no pain  -GJ    Rt Shoulder Flexion AROM 155 seated, painful  -GJ    Rt Shoulder Flexion PROM 170 supine no pain  -GJ    Rt Shoulder External Rotation AROM KAREN ipsilateral T 3  -GJ    Rt Shoulder External Rotation PROM 95 supine pos no pain  -GJ    Rt Shoulder Internal Rotation AROM FIR mildine L1/2  -GJ    Rt Shoulder Internal Rotation PROM 65 supine pos  -GJ       Left  Upper Ext    Lt Shoulder Abduction AROM 170 seated  -GJ    Lt Shoulder Abduction PROM 180 uspine  -GJ    Lt Shoulder Flexion AROM 170 seated  -GJ    Lt Shoulder Flexion PROM 180 supine  -GJ    Lt Shoulder External Rotation AROM KAREN mildine T 5,  -GJ    Lt Shoulder External Rotation PROM 100 supine pos  -GJ    Lt Shoulder Internal Rotation AROM FIR midline T 12  -GJ    Lt Shoulder Internal Rotation PROM 65 supine pos  -GJ       MMT (Manual Muscle Testing)    Rt Upper Ext Rt Shoulder Flexion;Rt Shoulder ABduction;Rt Shoulder Internal Rotation;Rt Shoulder External Rotation;Rt Elbow Flexion;Rt Elbow Extension  -GJ    Lt Upper Ext Lt Shoulder Flexion;Lt Shoulder ABduction;Lt Shoulder Internal Rotation;Lt Shoulder External Rotation;Lt Elbow Extension;Lt Elbow Flexion  -GJ       MMT Right Upper Ext    Rt Shoulder Flexion MMT, Gross Movement (4+/5) good plus  Assessed and available range isometric testing  -GJ    Rt Shoulder ABduction MMT, Gross Movement (4+/5) good plus  Assessed and available range isometric testing  -GJ    Rt Shoulder Internal Rotation MMT, Gross Movement (4+/5) good plus  -GJ    Rt Shoulder External Rotation MMT, Gross Movement (4+/5) good plus  -GJ    Rt Elbow Flexion MMT, Gross Movement: (5/5) normal  -GJ    Rt Elbow Extension MMT, Gross Movement: (5/5) normal  -GJ       MMT Left Upper Ext    Lt Shoulder Flexion MMT, Gross Movement (4+/5) good plus  -GJ    Lt Shoulder ABduction MMT, Gross Movement (4+/5) good plus  -GJ    Lt Shoulder Internal Rotation MMT, Gross Movement (4+/5) good plus  -GJ    Lt Shoulder External Rotation MMT, Gross Movement (4+/5) good plus  -GJ    Lt Elbow Flexion MMT, Gross Movement (5/5) normal  -GJ    Lt Elbow Extension MMT, Gross Movement (5/5) normal  -GJ       Flexibility    Flexibility Tested? Upper Extremity  -GJ       Upper Extremity Flexibility    Upper Trapezius Bilateral:;Mildly limited  -GJ    Levator Scapula Bilateral:;Mildly limited  -GJ    Pect Minor  Bilateral:;Mildly limited;Moderately limited  -GJ    Pect Major Bilateral:;Mildly limited;Moderately limited  -GJ    Latissimus Dorsi Bilateral:;Mildly limited;Moderately limited  -GJ              User Key  (r) = Recorded By, (t) = Taken By, (c) = Cosigned By      Initials Name Provider Type    Vijay Moreno, PT Physical Therapist                                Therapy Education  Education Details: [id number], discussed dx, px, poc, discussed anatomy of the shoulder and physiology of healing, discussed realistic expectations and time frames for therapy. Discussed activity modification. strengthening once every other day, stretches/ROM daily, call with questoins/concerns  Given: HEP, Symptoms/condition management, Pain management, Posture/body mechanics, Mobility training, Edema management  Program: New  How Provided: Verbal, Demonstration, Written  Provided to: Patient  Level of Understanding: Teach back education performed, Verbalized, Demonstrated      PT OP Goals       Row Name 10/07/24 0700          PT Short Term Goals    STG Date to Achieve 11/06/24  -GJ     STG 1 pt. to be I with initial HEP to facilitate self management of their condition  -GJ     STG 1 Progress New  -GJ     STG 2 pt. to be educated in/verbalize understanding of the importance of posture/ergonomics in association with their condition to facilitate self management of their condition  -GJ     STG 2 Progress New  -GJ        Long Term Goals    LTG Date to Achieve 01/05/25  -GJ     LTG 1 pt. to be I with advanced HEP to facilitate self management of their condition  -GJ     LTG 1 Progress New  -GJ     LTG 2 pt to demonstrate R shoulder flexion/abduction MMT >/= 4+/5 without pain to facilitate ease of performing household/work related activities  -GJ     LTG 2 Progress New  -GJ     LTG 3 pt. to demonstrate placing/retrieving small object from an above the shoulder level shelf with her RUE to facilitate ease of performing household/work related  activities  -     LTG 3 Progress New  -     LTG 4 pt. to demonstrate R shoulder flexion/ABD AROM >/=165 to facilitate ease of performing functional/household activities  -     LTG 4 Progress New  -        Time Calculation    PT Goal Re-Cert Due Date 01/05/25  -               User Key  (r) = Recorded By, (t) = Taken By, (c) = Cosigned By      Initials Name Provider Type     Vijay Munoz, PT Physical Therapist                     PT Assessment/Plan       Row Name 10/07/24 0752          PT Assessment    Functional Limitations Limitation in home management;Limitations in community activities;Performance in work activities;Performance in self-care ADL;Performance in leisure activities  -     Impairments Impaired flexibility;Impaired muscle endurance;Impaired muscle length;Impaired muscle power;Impaired postural alignment;Joint mobility;Muscle strength;Pain;Poor body mechanics;Posture;Range of motion  -     Assessment Comments Ms. Meyer is a 69-year-old right-handed female.  She presents to the clinic with reports of right shoulder pain approximately 3 to 4-week history of no known YAMEL.  She has previous history of PT in this clinic approximately 1 year ago for the same condition.  She did receive an injection several weeks ago which she reports did not help this time.  Past medical history positive for RA.  She denies numbness and tingling bilateral upper extremities.  She denies sleep disturbance secondary to shoulder pain.  Pain location is right shoulder (AC/deltoid region).  Pain is intermittent.  Aggravating activities include lifting, carrying her purse, driving car.  She is unable to describe relieving activities.  She is a .  Ms. Meyer presents to the clinic today demonstrating mild to moderate forward head posture, rounded shoulder posture, mild elevation of the right scapula.  Of note she demonstrates bilateral ulnar drift right greater than left.  She demonstrates mild  loss of range of motion actively on the right side with pain at endrange.  Of note she demonstrates full passive range of motion of the right fluidly and without pain.  She reports pain with muscle testing of the right shoulder flexion and abduction.  She demonstrates a positive Qureshi Avinash test on the right as well as a positive Neer's test.  She demonstrates negative full can, and empty can testing. Ms. Meyer  demonstrates evolving/stable s/s consistent with degenerative changes of the R shoulder/impingement syndrome which limits her participation in household/community mobility.    Aggravating/Personal factors affecting recovery include,  but are not limited to, hx of RA.  Ms. Meyer may benefit from skilled physical therapy to address the above impairments.  -GJ     Please refer to paper survey for additional self-reported information No  -GJ     Rehab Potential Excellent  -GJ     Patient/caregiver participated in establishment of treatment plan and goals Yes  -GJ     Patient would benefit from skilled therapy intervention Yes  -GJ        PT Plan    PT Frequency 1x/week;2x/week  -GJ     Predicted Duration of Therapy Intervention (PT) 10 visits  -GJ     Planned CPT's? PT EVAL MOD COMPLELITY: 95051;PT RE-EVAL: 89005;PT THER PROC EA 15 MIN: 89228;PT THER ACT EA 15 MIN: 44800;PT MANUAL THERAPY EA 15 MIN: 42389;PT NEUROMUSC RE-EDUCATION EA 15 MIN: 93855;PT ELECTRICAL STIM UNATTEND: ;PT HOT OR COLD PACK TREAT MCARE  -     PT Plan Comments next session, warm up on UBE, consider supine HA//D2, likely keep HEP simple, consider RS for ER/IR and 90 scaption  -               User Key  (r) = Recorded By, (t) = Taken By, (c) = Cosigned By      Initials Name Provider Type    Vijay Moreno, PT Physical Therapist                       OP Exercises       Row Name 10/07/24 0703 10/07/24 0700          Total Minutes    49416 - PT Therapeutic Exercise Minutes 9  -GJ --        Exercise 1    Exercise Name 1 -- UBE   -GJ     Additional Comments -- next session  -GJ        Exercise 2    Exercise Name 2 -- seated chin tucks  -GJ     Cueing 2 -- Verbal;Demo  -GJ        Exercise 3    Exercise Name 3 -- doorway stretch, (arms below shoulder level)  -GJ     Cueing 3 -- Verbal;Demo  -GJ        Exercise 4    Exercise Name 4 -- shoulder flexion on wall (B)  -GJ     Cueing 4 -- Verbal;Demo  -GJ        Exercise 5    Exercise Name 5 -- scap retraction/shoulder ext  -GJ     Cueing 5 -- Verbal;Demo  -GJ     Time 5 -- RTB  -GJ        Exercise 6    Exercise Name 6 -- standing B shoulder ER  -GJ     Cueing 6 -- Verbal;Demo  -GJ     Time 6 -- RTB  -GJ        Exercise 7    Exercise Name 7 -- SL ER, towel under humerus  -GJ     Cueing 7 -- Verbal;Demo  -GJ               User Key  (r) = Recorded By, (t) = Taken By, (c) = Cosigned By      Initials Name Provider Type     Vijay Munoz, PT Physical Therapist                                  Outcome Measure Options: Quick DASH         Time Calculation:     Start Time: 0704  Stop Time: 0743  Time Calculation (min): 39 min  Timed Charges  96526 - PT Therapeutic Exercise Minutes: 9  Total Minutes  Timed Charges Total Minutes: 9   Total Minutes: 9     Therapy Charges for Today       Code Description Service Date Service Provider Modifiers Qty    [id number]  PT THER PROC EA 15 MIN 10/7/2024 Vijay Munoz, PT GP 1    [id number]  PT EVAL MOD COMPLEXITY 2 10/7/2024 Vijay Munoz, PT GP 1            PT G-Codes  Outcome Measure Options: Felisa Munoz, PT  10/7/2024

## 2024-10-17 ENCOUNTER — TELEPHONE (OUTPATIENT)
Dept: ORTHOPEDIC SURGERY | Facility: CLINIC | Age: 69
End: 2024-10-17
Payer: MEDICARE

## 2024-10-17 DIAGNOSIS — M19.071 ARTHRITIS OF FIRST METATARSOPHALANGEAL (MTP) JOINT OF RIGHT FOOT: Primary | ICD-10-CM

## 2024-10-17 RX ORDER — HYDROXYCHLOROQUINE SULFATE 200 MG/1
400 TABLET, FILM COATED ORAL EVERY OTHER DAY
COMMUNITY

## 2024-10-17 NOTE — TELEPHONE ENCOUNTER
Caller: Stacey Meyer    Relationship to patient: Self    Best call back number: [phone]    Chief complaint: RIGHT FOOT     Type of visit: INJECTIONS     Requested date: END OF NOVEMBER      If rescheduling, when is the original appointment: N/A      Additional notes: PATIENT STATES SHE HAS THESE INJECTIONS AT [facility]

## 2024-10-18 ENCOUNTER — ANESTHESIA (OUTPATIENT)
Dept: GASTROENTEROLOGY | Facility: HOSPITAL | Age: 69
End: 2024-10-18
Payer: MEDICARE

## 2024-10-18 ENCOUNTER — ON CAMPUS - OUTPATIENT (OUTPATIENT)
Age: 69
End: 2024-10-18
Payer: MEDICARE

## 2024-10-18 ENCOUNTER — ON CAMPUS - OUTPATIENT (OUTPATIENT)
Dept: URBAN - METROPOLITAN AREA HOSPITAL 114 | Facility: HOSPITAL | Age: 69
End: 2024-10-18
Payer: MEDICARE

## 2024-10-18 ENCOUNTER — ANESTHESIA EVENT (OUTPATIENT)
Dept: GASTROENTEROLOGY | Facility: HOSPITAL | Age: 69
End: 2024-10-18
Payer: MEDICARE

## 2024-10-18 ENCOUNTER — HOSPITAL ENCOUNTER (OUTPATIENT)
Facility: HOSPITAL | Age: 69
Setting detail: HOSPITAL OUTPATIENT SURGERY
Discharge: HOME | End: 2024-10-18
Attending: INTERNAL MEDICINE | Admitting: INTERNAL MEDICINE
Payer: MEDICARE

## 2024-10-18 VITALS
DIASTOLIC BLOOD PRESSURE: 75 MMHG | SYSTOLIC BLOOD PRESSURE: 135 MMHG | RESPIRATION RATE: 16 BRPM | HEIGHT: 60 IN | OXYGEN SATURATION: 99 % | WEIGHT: 104.9 LBS | BODY MASS INDEX: 20.59 KG/M2 | HEART RATE: 63 BPM

## 2024-10-18 DIAGNOSIS — Z09 ENCOUNTER FOR FOLLOW-UP EXAMINATION AFTER COMPLETED TREATMEN: ICD-10-CM

## 2024-10-18 DIAGNOSIS — Z86.0101 PERSONAL HISTORY OF ADENOMATOUS AND SERRATED COLON POLYPS: ICD-10-CM

## 2024-10-18 DIAGNOSIS — K57.30 DIVERTICULOSIS OF LARGE INTESTINE WITHOUT PERFORATION OR ABS: ICD-10-CM

## 2024-10-18 DIAGNOSIS — K64.0 FIRST DEGREE HEMORRHOIDS: ICD-10-CM

## 2024-10-18 PROCEDURE — G0105 COLORECTAL SCRN; HI RISK IND: HCPCS | Performed by: INTERNAL MEDICINE

## 2024-10-18 PROCEDURE — 25810000003 SODIUM CHLORIDE 0.9 % SOLUTION: Performed by: INTERNAL MEDICINE

## 2024-10-18 PROCEDURE — 25010000002 PROPOFOL 10 MG/ML EMULSION: Performed by: NURSE ANESTHETIST, CERTIFIED REGISTERED

## 2024-10-18 PROCEDURE — 25010000002 LIDOCAINE 2% SOLUTION: Performed by: NURSE ANESTHETIST, CERTIFIED REGISTERED

## 2024-10-18 RX ORDER — LIDOCAINE HYDROCHLORIDE 20 MG/ML
INJECTION, SOLUTION INFILTRATION; PERINEURAL AS NEEDED
Status: DISCONTINUED | OUTPATIENT
Start: 2024-10-18 | End: 2024-10-18 | Stop reason: SURG

## 2024-10-18 RX ORDER — PROPOFOL 10 MG/ML
VIAL (ML) INTRAVENOUS AS NEEDED
Status: DISCONTINUED | OUTPATIENT
Start: 2024-10-18 | End: 2024-10-18 | Stop reason: SURG

## 2024-10-18 RX ORDER — SODIUM CHLORIDE 9 MG/ML
30 INJECTION, SOLUTION INTRAVENOUS CONTINUOUS PRN
Status: DISCONTINUED | OUTPATIENT
Start: 2024-10-18 | End: 2024-10-18 | Stop reason: HOSPADM

## 2024-10-18 RX ORDER — SODIUM CHLORIDE 0.9 % (FLUSH) 0.9 %
10 SYRINGE (ML) INJECTION AS NEEDED
Status: DISCONTINUED | OUTPATIENT
Start: 2024-10-18 | End: 2024-10-18 | Stop reason: HOSPADM

## 2024-10-18 RX ADMIN — SODIUM CHLORIDE 30 ML/HR: 9 INJECTION, SOLUTION INTRAVENOUS at 08:32

## 2024-10-18 RX ADMIN — LIDOCAINE HYDROCHLORIDE 60 MG: 20 INJECTION, SOLUTION INFILTRATION; PERINEURAL at 08:41

## 2024-10-18 RX ADMIN — PROPOFOL 80 MCG/KG/MIN: 10 INJECTION, EMULSION INTRAVENOUS at 08:42

## 2024-10-18 RX ADMIN — PROPOFOL 100 MG: 10 INJECTION, EMULSION INTRAVENOUS at 08:41

## 2024-10-18 RX ADMIN — SODIUM CHLORIDE: 9 INJECTION, SOLUTION INTRAVENOUS at 08:30

## 2024-10-18 NOTE — ANESTHESIA PREPROCEDURE EVALUATION
" Anesthesia Evaluation     Patient summary reviewed and Nursing notes reviewed   history of anesthetic complications:  PONV  NPO Solid Status: > 8 hours  NPO Liquid Status: > 2 hours           Airway   Mallampati: II  TM distance: >3 FB  Neck ROM: full  Possible difficult intubation  Dental      Pulmonary    (+) a smoker Former,  Cardiovascular     ECG reviewed    (+) hypertension, hyperlipidemia      Neuro/Psych  (+) psychiatric history Anxiety and Depression  GI/Hepatic/Renal/Endo    (+) thyroid problem hypothyroidism    Musculoskeletal     Abdominal    Substance History      OB/GYN          Other   arthritis, autoimmune disease rheumatoid arthritis,         Phys Exam Other: Previous grade IIa view            Anesthesia Plan    ASA 2     MAC     (I have reviewed the patient's history and chart with the patient, including all pertinent laboratory results and imaging. I have explained the risks of anesthesia including but not limited to dental damage, corneal abrasion, nerve injury, MI, stroke, aspiration, and death. Patient has agreed to proceed.    Ht 152.4 cm (60\")   BMI 20.94 kg/m²   )  intravenous induction     Anesthetic plan, risks, benefits, and alternatives have been provided, discussed and informed consent has been obtained with: patient.    CODE STATUS:         "

## 2024-10-18 NOTE — TELEPHONE ENCOUNTER
Called and left patient message that we would place the order for injection and she should schedule this in late November.  Left message that she is welcome to call or schedule follow-up appointment as needed subsequent to that when she has recurrent symptoms.

## 2024-10-18 NOTE — DISCHARGE INSTRUCTIONS
For the next 24 hours patient needs to be with a responsible adult.    For 24 hours DO NOT drive, operate machinery, appliances, drink alcohol, make important decisions or sign legal documents.    Start with a light or bland diet if you are feeling sick to your stomach otherwise advance to regular diet as tolerated.    Follow recommendations on procedure report if provided by your doctor.    Call Dr Costa for problems [phone]    Problems may include but not limited to: large amounts of bleeding, trouble breathing, repeated vomiting, severe unrelieved pain, fever or chills.

## 2024-10-18 NOTE — H&P
[facility]   HISTORY AND PHYSICAL    Patient Name: Stacey Meyer  : 1955  MRN: [id number]  Primary Care Physician:  Kayli Tucker III, NP-C  Date of admission: 10/18/2024    Subjective   Subjective     Chief Complaint: Personal history of colon polyps     History of Present Illness  The patient presents with no gastrointestinal  Symptoms or issues at this time   Review of Systems   All other systems reviewed and are negative.       Personal History     Past Medical History:   Diagnosis Date    Allergic ?    percadan sulfa emycin tetriccyclin flagel    Anxiety     Closed nondisplaced fracture of fifth right metatarsal bone 2023    Degeneration, intervertebral disc, thoracic     Depression     Depression 2016    Erosive osteoarthritis     Dr Galeano    History of peptic ulcer     History of transfusion     AFTER HIP REPLACEMENT     Hypertension     Hypothyroidism many years    Knee swelling 2009    OA (osteoarthritis) of hip     Osteoarthritis     PONV (postoperative nausea and vomiting)     Primary osteoarthritis of right hip     Rheumatoid arthritis     Thyroid disease     HYPOTHYROID    Visual impairment readers       Past Surgical History:   Procedure Laterality Date    BILATERAL BREAST REDUCTION Bilateral 2018    Procedure: BREAST REDUCTION BILATERAL, NEW IMAGE;  Surgeon: Myranda Perea MD;  Location: [facility] MAIN OR;  Service: New Image    BREAST SURGERY  2018    BUNIONECTOMY Left     COLONOSCOPY      2009    COLONOSCOPY N/A 2019    Procedure: COLONOSCOPY WITH COLD POLYPECTOMY x 2;  Surgeon: Danny Costa MD;  Location: [facility] ENDOSCOPY;  Service: Gastroenterology    ECTOPIC PREGNANCY SURGERY Bilateral     2 SEPARATE SURGERIES    ENDOSCOPY N/A 2016    Procedure: ESOPHAGOGASTRODUODENOSCOPY WITH COLD BIOPSIES;  Surgeon: Danny Costa MD;  Location: [facility] ENDOSCOPY;  Service:     ENDOSCOPY N/A 2016    Procedure:  "ESOPHAGOGASTRODUODENOSCOPY;  Surgeon: Danny Costa MD;  Location: [facility] ENDOSCOPY;  Service:     FERTILITY SURGERY      FOOT SURGERY Bilateral     \"THEY BROKE MY 5TH TOE ON BOTH SIDES AND STRIGHTENED THEM\"     HAMMER TOE REPAIR Bilateral     HAND SURGERY  04/28/2021    left hand     JOINT REPLACEMENT Right 07/15/2020    hand    TONSILLECTOMY      TOTAL HIP ARTHROPLASTY Right 07/13/2016    Procedure: RIGHT TOTAL HIP ARTHROPLASTY ANTERIOR APPROACH;  Surgeon: Eric Clayton MD;  Location: [facility] OR;  Service:        Family History: family history includes Autoimmune disease in her mother; Liver disease in her mother; Obesity in her brother; Stroke in her maternal aunt. Otherwise pertinent FHx was reviewed and not pertinent to current issue.    Social History:  reports that she quit smoking about 38 years ago. Her smoking use included cigarettes. She started smoking about 48 years ago. She has a 10 pack-year smoking history. She has never used smokeless tobacco. She reports that she does not currently use alcohol. She reports that she does not use drugs.    Home Medications:  hydroxychloroquine, levothyroxine, and losartan    Allergies:  Allergies   Allergen Reactions    Erythromycin Rash    Metronidazole Rash and Swelling    Oxycodone-Aspirin Nausea And Vomiting and Nausea Only    Sulfa Antibiotics Rash    Tetracycline Rash    Tetracyclines & Related Rash       Objective    Objective     Vitals:   Heart Rate:  [67] 67  Resp:  [16] 16  BP: (156)/(88) 156/88    Physical Exam  HENT:      Right Ear: External ear normal.      Left Ear: External ear normal.      Mouth/Throat:      Pharynx: Oropharynx is clear.   Eyes:      Conjunctiva/sclera: Conjunctivae normal.   Cardiovascular:      Rate and Rhythm: Normal rate.      Pulses: Normal pulses.   Pulmonary:      Effort: Pulmonary effort is normal.   Abdominal:      General: Abdomen is flat.   Skin:     General: Skin is warm and dry.   Neurological:      General: No " focal deficit present.      Mental Status: She is alert.   Psychiatric:         Mood and Affect: Mood normal.         Result Review    Result Review:  I have personally reviewed the results from the time of this admission to 10/18/2024 08:32 EDT and agree with these findings:  []  Laboratory list / accordion  []  Microbiology  []  Radiology  []  EKG/Telemetry   []  Cardiology/Vascular   [x]  Pathology  [x]  Old records  []  Other:  Most notable findings include:       Assessment & Plan   Assessment / Plan     Brief Patient Summary:  Stacey Meyer is a 69 y.o. female who   Personal history of colon polyps     Active Hospital Problems:  There are no active hospital problems to display for this patient.    Plan: Colonoscopy risks, alternatives and benefits discussed with patient and the patient is agreeable to having procedure done.      VTE Prophylaxis:  No VTE prophylaxis order currently exists.        CODE STATUS:       Admission Status:  I believe this patient meets  outpatient  status.    Danny Costa MD

## 2024-10-18 NOTE — ANESTHESIA POSTPROCEDURE EVALUATION
Patient: Stacey Meyer    Procedure Summary       Date: 10/18/24 Room / Location:  [facility]    Anesthesia Start: 0836 Anesthesia Stop: 0903    Procedure: COLONOSCOPY to cecum to TI Diagnosis:     Surgeons: Danny Costa MD Provider: Hattie Hurd MD    Anesthesia Type: MAC ASA Status: 2            Anesthesia Type: MAC    Vitals  Vitals Value Taken Time   /75 10/18/24 0912   Temp     Pulse 60 10/18/24 0914   Resp 16 10/18/24 0912   SpO2 99 % 10/18/24 0914   Vitals shown include unfiled device data.        Post Anesthesia Care and Evaluation    Patient location during evaluation: bedside  Patient participation: complete - patient participated  Level of consciousness: awake and alert  Pain management: adequate    Airway patency: patent  Anesthetic complications: No anesthetic complications  PONV Status: controlled  Cardiovascular status: acceptable  Respiratory status: acceptable  Hydration status: acceptable

## 2024-10-29 ENCOUNTER — HOSPITAL ENCOUNTER (OUTPATIENT)
Dept: PHYSICAL THERAPY | Facility: HOSPITAL | Age: 69
Setting detail: THERAPIES SERIES
Discharge: HOME | End: 2024-10-29
Payer: MEDICARE

## 2024-10-29 DIAGNOSIS — Z74.09 IMPAIRED MOBILITY: ICD-10-CM

## 2024-10-29 DIAGNOSIS — M25.511 RIGHT SHOULDER PAIN, UNSPECIFIED CHRONICITY: Primary | ICD-10-CM

## 2024-10-29 PROCEDURE — 97110 THERAPEUTIC EXERCISES: CPT | Performed by: PHYSICAL THERAPIST

## 2024-10-29 PROCEDURE — 97140 MANUAL THERAPY 1/> REGIONS: CPT | Performed by: PHYSICAL THERAPIST

## 2024-10-29 NOTE — THERAPY TREATMENT NOTE
Outpatient Physical Therapy Ortho Treatment Note  [facility]     Patient Name: Stacey Meyer  : 1955  MRN: [id number]  Today's Date: 10/29/2024      Visit Date: 10/29/2024    Visit Dx:    ICD-10-CM ICD-9-CM   1. Right shoulder pain, unspecified chronicity  M25.511 719.41   2. Impaired mobility  Z74.09 799.89       Patient Active Problem List   Diagnosis    Degeneration of lumbar intervertebral disc    Degeneration, intervertebral disc, thoracic    Fibromyalgia    Transient insomnia    Primary osteoarthritis of right hip    Familial ligamentous laxity    Hypertension    OA (osteoarthritis) of hip    Acquired hypothyroidism    Rheumatoid arthritis of multiple sites with negative rheumatoid factor    Gigantomastia    Erosive osteoarthritis    Depression with anxiety    Mixed hyperlipidemia    Arthritis of foot    Closed nondisplaced fracture of fifth right metatarsal bone    Arthritis of first metatarsophalangeal (MTP) joint of right foot    Blurry vision        Past Medical History:   Diagnosis Date    Allergic ?    percadan sulfa emycin tetriccyclin flagel    Anxiety     Closed nondisplaced fracture of fifth right metatarsal bone 2023    Degeneration, intervertebral disc, thoracic     Depression     Depression 2016    Erosive osteoarthritis     Dr Galeano    History of peptic ulcer     History of transfusion     AFTER HIP REPLACEMENT     Hypertension     Hypothyroidism many years    Knee swelling 2009    OA (osteoarthritis) of hip     Osteoarthritis     PONV (postoperative nausea and vomiting)     Primary osteoarthritis of right hip     Rheumatoid arthritis     Thyroid disease     HYPOTHYROID    Visual impairment readers        Past Surgical History:   Procedure Laterality Date    BILATERAL BREAST REDUCTION Bilateral 2018    Procedure: BREAST REDUCTION BILATERAL, NEW IMAGE;  Surgeon: Myranda Perea MD;  Location: [facility] OR;  Service: New Image    BREAST SURGERY   "2018    BUNIONECTOMY Left     COLONOSCOPY      2009    COLONOSCOPY N/A 06/26/2019    Procedure: COLONOSCOPY WITH COLD POLYPECTOMY x 2;  Surgeon: Danny Costa MD;  Location: [facility] ENDOSCOPY;  Service: Gastroenterology    COLONOSCOPY N/A 10/18/2024    Procedure: COLONOSCOPY to cecum to TI;  Surgeon: Danny Costa MD;  Location: [facility] ENDOSCOPY;  Service: Gastroenterology;  Laterality: N/A;  pre: colon screening, h/o polyps  post:diverticulosis, hemorrhoids    ECTOPIC PREGNANCY SURGERY Bilateral     2 SEPARATE SURGERIES    ENDOSCOPY N/A 05/24/2016    Procedure: ESOPHAGOGASTRODUODENOSCOPY WITH COLD BIOPSIES;  Surgeon: Danny Costa MD;  Location: [facility] ENDOSCOPY;  Service:     ENDOSCOPY N/A 11/01/2016    Procedure: ESOPHAGOGASTRODUODENOSCOPY;  Surgeon: Danny Costa MD;  Location: [facility] ENDOSCOPY;  Service:     FERTILITY SURGERY      FOOT SURGERY Bilateral     \"THEY BROKE MY 5TH TOE ON BOTH SIDES AND STRIGHTENED THEM\"     HAMMER TOE REPAIR Bilateral     HAND SURGERY  04/28/2021    left hand     JOINT REPLACEMENT Right 07/15/2020    hand    TONSILLECTOMY      TOTAL HIP ARTHROPLASTY Right 07/13/2016    Procedure: RIGHT TOTAL HIP ARTHROPLASTY ANTERIOR APPROACH;  Surgeon: Eric Clayton MD;  Location: [facility] OR;  Service:                         PT Assessment/Plan       Row Name 10/29/24 0754          PT Assessment    Assessment Comments Ms. Meyer returns to the clinic for her R shoulder pain for her first follow up PT session.  PMHx of RA. She reports no change in her condition and limited adherence to HEP.  Encouraged improved adherence to HEP. Reviewed anatomy of the shoulder and physiology of healing, including realistic time frames/outcomes. Today's session was somewhat limited secondary to patient time constraints.  Warmed up on UBE, reviewed current scapular girdle/RC strengtheing activities. No changes to in clinic or home program.  Initiated RS for ER/IR and 110 scaption to work on neuromuscular " effeciancy and joint centration. She continues to demonstrate near full PROm of the R shoulder. Ms. Meyer continues to be a good candidate for skilled physical therapy.  -GJ        PT Plan    PT Plan Comments assess response to return to exercises in the clinic, assess adherence to HEP, ? supine HA/D2, continue RS, work on dorsal scapular strength  -GJ               User Key  (r) = Recorded By, (t) = Taken By, (c) = Cosigned By      Initials Name Provider Type    Vijay Moreno, PT Physical Therapist                       OP Exercises       Row Name 10/29/24 0745 10/29/24 0700          Subjective    Subjective Comments -- i'll be honest,, i haven't been real diligent with my exercises. My shoulder is just bothering me. having a hard time driving (pulling gear shift into gear)  -GJ        Subjective Pain    Pre-Treatment Pain Level -- 5  -GJ        Total Minutes    90923 - PT Therapeutic Exercise Minutes 15  -GJ --     37690 - PT Manual Therapy Minutes 10  -GJ --        Exercise 1    Exercise Name 1 -- UBE  -GJ     Time 1 -- 4 min  -GJ        Exercise 2    Exercise Name 2 -- seated chin tucks  -GJ     Cueing 2 -- Verbal;Demo  -GJ     Reps 2 -- 15  -GJ     Time 2 -- 5s  -GJ        Exercise 4    Exercise Name 4 -- shoulder flexion on wall (B)  -GJ     Cueing 4 -- Verbal;Demo  -GJ     Reps 4 -- 20  -GJ        Exercise 5    Exercise Name 5 -- scap retraction/shoulder ext  -GJ     Cueing 5 -- Verbal;Demo  -GJ     Sets 5 -- 2  -GJ     Reps 5 -- 10  -GJ     Time 5 -- RTB  -GJ        Exercise 6    Exercise Name 6 -- standing B shoulder ER  -GJ     Cueing 6 -- Verbal;Demo  -GJ     Sets 6 -- 2  -GJ     Reps 6 -- 10  -GJ     Time 6 -- RTB  -GJ        Exercise 7    Exercise Name 7 -- SL ER, towel under humerus  -GJ     Cueing 7 -- --  -GJ     Additional Comments -- next session  -GJ               User Key  (r) = Recorded By, (t) = Taken By, (c) = Cosigned By      Initials Name Provider Type    Vijay Moreno, PT  Physical Therapist                             Manual Rx (Last 36 Hours)       Manual Treatments       Row Name 10/29/24 0745 10/29/24 0700          Total Minutes    48983 - PT Manual Therapy Minutes 10  -GJ --        Manual Rx 1    Manual Rx 1 Location -- RS for ER/ scaption  -GJ     Manual Rx 1 Type -- 3 x 1 min each  -GJ     Manual Rx 1 Grade -- PROM R shoulder flexion/ER  -GJ               User Key  (r) = Recorded By, (t) = Taken By, (c) = Cosigned By      Initials Name Provider Type     Vijay Munoz, PT Physical Therapist                     PT OP Goals       Row Name 10/29/24 0700          PT Short Term Goals    STG Date to Achieve 11/06/24  -GJ     STG 1 pt. to be I with initial HEP to facilitate self management of their condition  -GJ     STG 1 Progress Ongoing  -GJ     STG 1 Progress Comments encourated ahderence  -GJ     STG 2 pt. to be educated in/verbalize understanding of the importance of posture/ergonomics in association with their condition to facilitate self management of their condition  -GJ     STG 2 Progress Ongoing  -GJ     STG 2 Progress Comments reviewed  -GJ        Long Term Goals    LTG Date to Achieve 01/05/25  -GJ     LTG 1 pt. to be I with advanced HEP to facilitate self management of their condition  -GJ     LTG 1 Progress Ongoing  -GJ     LTG 2 pt to demonstrate R shoulder flexion/abduction MMT >/= 4+/5 without pain to facilitate ease of performing household/work related activities  -GJ     LTG 2 Progress Ongoing  -GJ     LTG 3 pt. to demonstrate placing/retrieving small object from an above the shoulder level shelf with her RUE to facilitate ease of performing household/work related activities  -GJ     LTG 3 Progress Ongoing  -GJ     LTG 4 pt. to demonstrate R shoulder flexion/ABD AROM >/=165 to facilitate ease of performing functional/household activities  -GJ     LTG 4 Progress Ongoing  -GJ               User Key  (r) = Recorded By, (t) = Taken By, (c) = Cosigned By       Initials Name Provider Type     Vijay Munoz, PT Physical Therapist                    Therapy Education  Education Details: reviewed activity modifications, encouraged adherence to HEP , to perform once every other day. reviewed anatomy of the shoulder and physiology of healing  Given: HEP, Symptoms/condition management, Pain management, Posture/body mechanics, Fall prevention and home safety, Mobility training  Program: Reinforced  How Provided: Verbal, Demonstration  Provided to: Patient  Level of Understanding: Teach back education performed, Verbalized, Demonstrated              Time Calculation:   Start Time: 0745  Stop Time: 0815  Time Calculation (min): 30 min  Timed Charges  37392 - PT Therapeutic Exercise Minutes: 15  06873 - PT Manual Therapy Minutes: 10  Total Minutes  Timed Charges Total Minutes: 25   Total Minutes: 25  Therapy Charges for Today       Code Description Service Date Service Provider Modifiers Qty    [id number]  PT THER PROC EA 15 MIN 10/29/2024 Vijay Munoz, PT GP 1    [id number]  PT MANUAL THERAPY EA 15 MIN 10/29/2024 Vijay Munoz, PT GP 1                      Vijay Munoz PT  10/29/2024

## 2024-11-12 ENCOUNTER — HOSPITAL ENCOUNTER (OUTPATIENT)
Dept: PHYSICAL THERAPY | Facility: HOSPITAL | Age: 69
Setting detail: THERAPIES SERIES
Discharge: HOME | End: 2024-11-12
Payer: MEDICARE

## 2024-11-12 DIAGNOSIS — Z74.09 IMPAIRED MOBILITY: ICD-10-CM

## 2024-11-12 DIAGNOSIS — M25.511 RIGHT SHOULDER PAIN, UNSPECIFIED CHRONICITY: Primary | ICD-10-CM

## 2024-11-12 PROCEDURE — 97110 THERAPEUTIC EXERCISES: CPT | Performed by: PHYSICAL THERAPIST

## 2024-11-12 PROCEDURE — 97140 MANUAL THERAPY 1/> REGIONS: CPT | Performed by: PHYSICAL THERAPIST

## 2024-11-12 NOTE — THERAPY PROGRESS REPORT/RE-CERT
Outpatient Physical Therapy Ortho Progress Note  [facility]     Patient Name: Stacey Meyer  : 1955  MRN: [id number]  Today's Date: 2024      Visit Date: 2024    Visit Dx:    ICD-10-CM ICD-9-CM   1. Right shoulder pain, unspecified chronicity  M25.511 719.41   2. Impaired mobility  Z74.09 799.89       Patient Active Problem List   Diagnosis    Degeneration of lumbar intervertebral disc    Degeneration, intervertebral disc, thoracic    Fibromyalgia    Transient insomnia    Primary osteoarthritis of right hip    Familial ligamentous laxity    Hypertension    OA (osteoarthritis) of hip    Acquired hypothyroidism    Rheumatoid arthritis of multiple sites with negative rheumatoid factor    Gigantomastia    Erosive osteoarthritis    Depression with anxiety    Mixed hyperlipidemia    Arthritis of foot    Closed nondisplaced fracture of fifth right metatarsal bone    Arthritis of first metatarsophalangeal (MTP) joint of right foot    Blurry vision        Past Medical History:   Diagnosis Date    Allergic ?    percadan sulfa emycin tetriccyclin flagel    Anxiety     Closed nondisplaced fracture of fifth right metatarsal bone 2023    Degeneration, intervertebral disc, thoracic     Depression     Depression 2016    Erosive osteoarthritis     Dr Galeano    History of peptic ulcer     History of transfusion     AFTER HIP REPLACEMENT     Hypertension     Hypothyroidism many years    Knee swelling 2009    OA (osteoarthritis) of hip     Osteoarthritis     PONV (postoperative nausea and vomiting)     Primary osteoarthritis of right hip     Rheumatoid arthritis     Thyroid disease     HYPOTHYROID    Visual impairment readers        Past Surgical History:   Procedure Laterality Date    BILATERAL BREAST REDUCTION Bilateral 2018    Procedure: BREAST REDUCTION BILATERAL, NEW IMAGE;  Surgeon: Myranda Perea MD;  Location: [facility] OR;  Service: New Image    BREAST SURGERY   "2018    BUNIONECTOMY Left     COLONOSCOPY      2009    COLONOSCOPY N/A 06/26/2019    Procedure: COLONOSCOPY WITH COLD POLYPECTOMY x 2;  Surgeon: Danny Costa MD;  Location: [facility] ENDOSCOPY;  Service: Gastroenterology    COLONOSCOPY N/A 10/18/2024    Procedure: COLONOSCOPY to cecum to TI;  Surgeon: Danny Costa MD;  Location: [facility] ENDOSCOPY;  Service: Gastroenterology;  Laterality: N/A;  pre: colon screening, h/o polyps  post:diverticulosis, hemorrhoids    ECTOPIC PREGNANCY SURGERY Bilateral     2 SEPARATE SURGERIES    ENDOSCOPY N/A 05/24/2016    Procedure: ESOPHAGOGASTRODUODENOSCOPY WITH COLD BIOPSIES;  Surgeon: Danny Costa MD;  Location: [facility] ENDOSCOPY;  Service:     ENDOSCOPY N/A 11/01/2016    Procedure: ESOPHAGOGASTRODUODENOSCOPY;  Surgeon: Danny Costa MD;  Location: [facility] ENDOSCOPY;  Service:     FERTILITY SURGERY      FOOT SURGERY Bilateral     \"THEY BROKE MY 5TH TOE ON BOTH SIDES AND STRIGHTENED THEM\"     HAMMER TOE REPAIR Bilateral     HAND SURGERY  04/28/2021    left hand     JOINT REPLACEMENT Right 07/15/2020    hand    TONSILLECTOMY      TOTAL HIP ARTHROPLASTY Right 07/13/2016    Procedure: RIGHT TOTAL HIP ARTHROPLASTY ANTERIOR APPROACH;  Surgeon: Eric Clayton MD;  Location: [facility];  Service:         PT Ortho       Row Name 11/12/24 0700       Posture/Observations    Forward Head Mild  -GJ    Rounded Shoulders Bilateral:;Moderate  -GJ    Scapular Elevation Right:;Mild  -GJ    Posture/Observations Comments Noted ulnar drift bilateral hands greater on the right than the left  -GJ       Right Upper Ext    Rt Shoulder Abduction AROM 165 seated  -GJ    Rt Shoulder Flexion AROM 160 seated  -GJ    Rt Shoulder External Rotation AROM KAREN midline occiput  -GJ    Rt Shoulder Internal Rotation AROM FIR midlineT 12  -GJ       MMT Right Upper Ext    Rt Shoulder Flexion MMT, Gross Movement (4+/5) good plus  in available range to isometric testing,  -GJ    Rt Shoulder ABduction MMT, Gross " Movement (4+/5) good plus  in available range to isometric testing, painful  -GJ    Rt Shoulder Internal Rotation MMT, Gross Movement (4+/5) good plus  -GJ    Rt Shoulder External Rotation MMT, Gross Movement (4+/5) good plus  painful  -GJ    Rt Elbow Flexion MMT, Gross Movement: (5/5) normal  -GJ    Rt Elbow Extension MMT, Gross Movement: (5/5) normal  -GJ              User Key  (r) = Recorded By, (t) = Taken By, (c) = Cosigned By      Initials Name Provider Type    Vijay Moreno, PT Physical Therapist                                 PT Assessment/Plan       Row Name 11/12/24 0710          PT Assessment    Functional Limitations Limitation in home management;Limitations in community activities;Performance in leisure activities;Performance in work activities;Performance in self-care ADL  -     Impairments Impaired flexibility;Impaired muscle endurance;Impaired muscle length;Impaired muscle power;Impaired postural alignment;Joint mobility;Muscle strength;Pain;Poor body mechanics;Posture;Range of motion  -     Assessment Comments Ms. Meyer is a 68 y/o, R handed female . Shee has attended 3 sessions of physical therapy for her R shoulder pain. PMHx of RA.  Treatment has included therapeutic exercise, manual therapy, therapeutic activity, and patient education with home exercise program . Progress towards goals is x having met 2 of 2 STG's and 0 of 4 LTG's (however 3 LTG's are paritally met). She demosntrates simialar AROM of the R shoulder, improved rhythm, improving pain with similar pain levels. She demonstrates ability to perform functional activities such as reaching shelves, however painful. We continued to work on scapular girdle strengthening.  Added supine horizontal abduction, bilateral external rotation, side-lying external rotation.  Updated HEP accordingly.  Strengthening to be once every other day.  Encourage patient adherence with HEP to allow for optimal outcomes.  She continues to be a good  candidate for skilled physical therapy.  Will see her once a week for 2-3 additional sessions.  She is in agreement with this plan.  -GJ     Please refer to paper survey for additional self-reported information No  -GJ     Rehab Potential Excellent  -GJ     Patient/caregiver participated in establishment of treatment plan and goals Yes  -GJ     Patient would benefit from skilled therapy intervention Yes  -GJ        PT Plan    PT Frequency 1x/week;2x/week  -GJ     Predicted Duration of Therapy Intervention (PT) 10 visits  -GJ     Planned CPT's? PT RE-EVAL: 04606;PT THER PROC EA 15 MIN: 11153;PT THER ACT EA 15 MIN: 59442;PT MANUAL THERAPY EA 15 MIN: 92490;PT NEUROMUSC RE-EDUCATION EA 15 MIN: 66422;PT HOT OR COLD PACK TREAT MCARE;PT ELECTRICAL STIM UNATTEND:   -GJ     PT Plan Comments Assess response to previous session with progression of exercises.  Assess adherence to HEP.  Consider supine triceps, D2 flexion in conjunction with HA  -GJ               User Key  (r) = Recorded By, (t) = Taken By, (c) = Cosigned By      Initials Name Provider Type     Vijay Munoz, PT Physical Therapist                       OP Exercises       Row Name 11/12/24 0701 11/12/24 0700          Subjective    Subjective Comments -- overall i'm a little better, i was signicantly better for several days after I saw you last time.  -GJ        Subjective Pain    Pre-Treatment Pain Level -- 5  -GJ        Total Minutes    43864 - PT Therapeutic Exercise Minutes 30  -GJ --     76353 - PT Manual Therapy Minutes 10  -GJ --        Exercise 1    Exercise Name 1 -- UBE  -GJ     Time 1 -- 4 min  -GJ        Exercise 2    Exercise Name 2 -- seated chin tucks  -GJ     Cueing 2 -- Verbal;Demo  -GJ     Reps 2 -- 15  -GJ     Time 2 -- 5s  -GJ        Exercise 4    Exercise Name 4 -- shoulder flexion on wall (B)  -GJ     Cueing 4 -- Verbal;Demo  -GJ     Reps 4 -- 20  -GJ        Exercise 5    Exercise Name 5 -- scap retraction/shoulder ext  -GJ      Cueing 5 -- Verbal;Demo  -GJ     Sets 5 -- 2  -GJ     Reps 5 -- 10  -GJ     Time 5 -- RTB  -GJ        Exercise 6    Exercise Name 6 -- B shoulder ER  -GJ     Cueing 6 -- Verbal;Demo  -GJ     Sets 6 -- 2  -GJ     Reps 6 -- 10  -GJ     Time 6 -- RTB  -GJ     Additional Comments -- supine  -GJ        Exercise 7    Exercise Name 7 -- SL ER, towel under humerus  -GJ     Cueing 7 -- Verbal;Demo  -GJ     Sets 7 -- 2  -GJ     Reps 7 -- 10  -GJ     Time 7 -- 1#  -GJ        Exercise 8    Exercise Name 8 -- supine HA  -GJ     Cueing 8 -- Verbal;Demo  -GJ     Reps 8 -- 2  -GJ     Time 8 -- 10  -GJ     Additional Comments -- RTB  -GJ               User Key  (r) = Recorded By, (t) = Taken By, (c) = Cosigned By      Initials Name Provider Type    Vijay Moreno, PT Physical Therapist                             Manual Rx (Last 36 Hours)       Manual Treatments       Row Name 11/12/24 0701 11/12/24 0700          Total Minutes    67430 - PT Manual Therapy Minutes 10  -GJ --        Manual Rx 1    Manual Rx 1 Location -- RS for ER/ scaption  -GJ     Manual Rx 1 Type -- 3 x 1 min each  -GJ     Manual Rx 1 Grade -- PROM R shoulder flexion/ER  -GJ        Manual Rx 2    Manual Rx 2 Location -- PROM R shoulder, all planes  -GJ     Manual Rx 2 Type -- R GHJ approximation in circles, CW, CCW  -GJ               User Key  (r) = Recorded By, (t) = Taken By, (c) = Cosigned By      Initials Name Provider Type    Vijay Moreno, PT Physical Therapist                     PT OP Goals       Row Name 11/12/24 0700          PT Short Term Goals    STG Date to Achieve 11/06/24  -GJ     STG 1 pt. to be I with initial HEP to facilitate self management of their condition  -GJ     STG 1 Progress Met  -GJ     STG 2 pt. to be educated in/verbalize understanding of the importance of posture/ergonomics in association with their condition to facilitate self management of their condition  -GJ     STG 2 Progress Met  -GJ        Long Term Goals    LTG  Date to Achieve 01/05/25  -GJ     LTG 1 pt. to be I with advanced HEP to facilitate self management of their condition  -GJ     LTG 1 Progress Ongoing  -GJ     LTG 2 pt to demonstrate R shoulder flexion/abduction MMT >/= 4+/5 without pain to facilitate ease of performing household/work related activities  -GJ     LTG 2 Progress Ongoing;Partially Met  -GJ     LTG 2 Progress Comments see ortho, painful with Abd  -GJ     LTG 3 pt. to demonstrate placing/retrieving small object from an above the shoulder level shelf with her RUE to facilitate ease of performing household/work related activities  -GJ     LTG 3 Progress Ongoing;Partially Met  -GJ     LTG 3 Progress Comments able to but painful  -GJ     LTG 4 pt. to demonstrate R shoulder flexion/ABD AROM >/=165 to facilitate ease of performing functional/household activities  -GJ     LTG 4 Progress Ongoing;Partially Met  -GJ     LTG 4 Progress Comments see ortho  -GJ               User Key  (r) = Recorded By, (t) = Taken By, (c) = Cosigned By      Initials Name Provider Type    Vijay Moreno, PT Physical Therapist                    Therapy Education  Education Details: [id number], reviewed activity modifications, encouraged improved adhrence to HEP, strengtheing to be once every other day  Given: HEP, Symptoms/condition management, Pain management, Posture/body mechanics, Fall prevention and home safety, Mobility training  Program: Reinforced, New, Progressed, Modified  How Provided: Verbal, Demonstration, Written  Provided to: Patient  Level of Understanding: Teach back education performed, Verbalized, Demonstrated              Time Calculation:   Start Time: 0701  Stop Time: 0744  Time Calculation (min): 43 min  Timed Charges  26424 - PT Therapeutic Exercise Minutes: 30  22855 - PT Manual Therapy Minutes: 10  Total Minutes  Timed Charges Total Minutes: 40   Total Minutes: 40  Therapy Charges for Today       Code Description Service Date Service Provider Modifiers  Qty    48239654060  PT THER PROC EA 15 MIN 11/12/2024 Vijay Munoz, PT GP 2    [id number]  PT MANUAL THERAPY EA 15 MIN 11/12/2024 Vijay Munoz, PT GP 1                      Vijay Munoz, PT  11/12/2024

## 2024-11-19 ENCOUNTER — HOSPITAL ENCOUNTER (OUTPATIENT)
Dept: PHYSICAL THERAPY | Facility: HOSPITAL | Age: 69
Setting detail: THERAPIES SERIES
Discharge: HOME | End: 2024-11-19
Payer: MEDICARE

## 2024-11-19 DIAGNOSIS — M25.511 RIGHT SHOULDER PAIN, UNSPECIFIED CHRONICITY: Primary | ICD-10-CM

## 2024-11-19 DIAGNOSIS — Z74.09 IMPAIRED MOBILITY: ICD-10-CM

## 2024-11-19 PROCEDURE — 97110 THERAPEUTIC EXERCISES: CPT

## 2024-11-19 NOTE — THERAPY TREATMENT NOTE
Outpatient Physical Therapy Ortho Treatment Note  [facility]     Patient Name: Stacey Meyer  : 1955  MRN: [id number]  Today's Date: 2024      Visit Date: 2024    Visit Dx:    ICD-10-CM ICD-9-CM   1. Right shoulder pain, unspecified chronicity  M25.511 719.41   2. Impaired mobility  Z74.09 799.89       Patient Active Problem List   Diagnosis    Degeneration of lumbar intervertebral disc    Degeneration, intervertebral disc, thoracic    Fibromyalgia    Transient insomnia    Primary osteoarthritis of right hip    Familial ligamentous laxity    Hypertension    OA (osteoarthritis) of hip    Acquired hypothyroidism    Rheumatoid arthritis of multiple sites with negative rheumatoid factor    Gigantomastia    Erosive osteoarthritis    Depression with anxiety    Mixed hyperlipidemia    Arthritis of foot    Closed nondisplaced fracture of fifth right metatarsal bone    Arthritis of first metatarsophalangeal (MTP) joint of right foot    Blurry vision        Past Medical History:   Diagnosis Date    Allergic ?    percadan sulfa emycin tetriccyclin flagel    Anxiety     Closed nondisplaced fracture of fifth right metatarsal bone 2023    Degeneration, intervertebral disc, thoracic     Depression     Depression 2016    Erosive osteoarthritis     Dr Galeano    History of peptic ulcer     History of transfusion     AFTER HIP REPLACEMENT     Hypertension     Hypothyroidism many years    Knee swelling 2009    OA (osteoarthritis) of hip     Osteoarthritis     PONV (postoperative nausea and vomiting)     Primary osteoarthritis of right hip     Rheumatoid arthritis     Thyroid disease     HYPOTHYROID    Visual impairment readers        Past Surgical History:   Procedure Laterality Date    BILATERAL BREAST REDUCTION Bilateral 2018    Procedure: BREAST REDUCTION BILATERAL, NEW IMAGE;  Surgeon: Myranda Perea MD;  Location: [facility] OR;  Service: New Image    BREAST SURGERY   "2018    BUNIONECTOMY Left     COLONOSCOPY      2009    COLONOSCOPY N/A 06/26/2019    Procedure: COLONOSCOPY WITH COLD POLYPECTOMY x 2;  Surgeon: Danny Costa MD;  Location: [facility] ENDOSCOPY;  Service: Gastroenterology    COLONOSCOPY N/A 10/18/2024    Procedure: COLONOSCOPY to cecum to TI;  Surgeon: Danny Costa MD;  Location: [facility] ENDOSCOPY;  Service: Gastroenterology;  Laterality: N/A;  pre: colon screening, h/o polyps  post:diverticulosis, hemorrhoids    ECTOPIC PREGNANCY SURGERY Bilateral     2 SEPARATE SURGERIES    ENDOSCOPY N/A 05/24/2016    Procedure: ESOPHAGOGASTRODUODENOSCOPY WITH COLD BIOPSIES;  Surgeon: Danny Costa MD;  Location: [facility] ENDOSCOPY;  Service:     ENDOSCOPY N/A 11/01/2016    Procedure: ESOPHAGOGASTRODUODENOSCOPY;  Surgeon: Danny Costa MD;  Location: [facility] ENDOSCOPY;  Service:     FERTILITY SURGERY      FOOT SURGERY Bilateral     \"THEY BROKE MY 5TH TOE ON BOTH SIDES AND STRIGHTENED THEM\"     HAMMER TOE REPAIR Bilateral     HAND SURGERY  04/28/2021    left hand     JOINT REPLACEMENT Right 07/15/2020    hand    TONSILLECTOMY      TOTAL HIP ARTHROPLASTY Right 07/13/2016    Procedure: RIGHT TOTAL HIP ARTHROPLASTY ANTERIOR APPROACH;  Surgeon: Eric Clayton MD;  Location: [facility] OR;  Service:                         PT Assessment/Plan       Row Name 11/19/24 0700          PT Assessment    Assessment Comments Pt arrives at 7:11 for 7:00 session, began on UBE for warm-up and moved into RS manual techniques at pt's request as she felt significant improvement from last session afterwards. Pt showing great stability throughout RS techniques in all directions. Provided pt w/ strengthening activities and provided pt w/ resisted D2 flexion w/ RTB. Pt cued to improve eccentric strength throughout exercises today, appears to be in a rush as she states she needs to leaves today's session a little early. Pt able to progress resistance during SL ER to 2# as 1# was too easy and noted w/ " decreased control due to diminished resistance. TB resistance appears too easy this session adn able to progress as well, good strength but pt will need continued interventions to address decreased stability specifically with eccentric phase of movement. Updated HEP w/ addition of standing cervical retraction and D2 flexion.  -CS        PT Plan    Predicted Duration of Therapy Intervention (PT) --  -CS     PT Plan Comments Theraloop flexion, wall walks w/ TB? return to manual  -CS               User Key  (r) = Recorded By, (t) = Taken By, (c) = Cosigned By      Initials Name Provider Type    CS Victor Manuel Luque, PT Physical Therapist                       OP Exercises       Row Name 11/19/24 0700             Subjective    Subjective Comments I'm feeling better but still having some pain  -CS         Subjective Pain    Pre-Treatment Pain Level 5  -CS      Post-Treatment Pain Level 5  -CS         Total Minutes    72721 - PT Therapeutic Exercise Minutes 27  -CS      23735 - PT Manual Therapy Minutes 5  -CS         Exercise 1    Exercise Name 1 UBE  -CS      Time 1 4 min  -CS         Exercise 2    Exercise Name 2 Standing chin tucks  -CS      Cueing 2 Verbal;Demo  -CS      Reps 2 20  -CS      Time 2 5s  -CS      Additional Comments Noodle at back  -CS         Exercise 4    Exercise Name 4 shoulder flexion on wall (B)  -CS      Cueing 4 Verbal;Demo  -CS      Reps 4 20  -CS      Additional Comments W/ pillow case  -CS         Exercise 5    Exercise Name 5 scap retraction/shoulder ext  -CS      Cueing 5 Verbal;Demo  -CS      Sets 5 2  -CS      Reps 5 10  -CS      Time 5 GTB  -CS         Exercise 6    Exercise Name 6 B shoulder ER  -CS      Cueing 6 Verbal;Demo  -CS      Sets 6 2  -CS      Reps 6 10  -CS      Time 6 RTB  -CS      Additional Comments supine  -CS         Exercise 7    Exercise Name 7 SL ER, towel under humerus  -CS      Cueing 7 Verbal;Demo  -CS      Sets 7 2  -CS      Reps 7 10  -CS      Time 7 2#  -CS          Exercise 8    Exercise Name 8 supine HA w/ tricep ext  -CS      Cueing 8 Verbal;Demo  -CS      Reps 8 2  -CS      Time 8 10  -CS      Additional Comments RTB  -CS         Exercise 9    Exercise Name 9 Standing D2 flexion  -CS      Cueing 9 Verbal  -CS      Sets 9 2  -CS      Reps 9 10  -CS      Additional Comments RTB  -CS                User Key  (r) = Recorded By, (t) = Taken By, (c) = Cosigned By      Initials Name Provider Type    CS Victor Manuel Luque, PT Physical Therapist                             Manual Rx (Last 36 Hours)       Manual Treatments       Row Name 11/19/24 0700             Total Minutes    17014 - PT Manual Therapy Minutes 5  -CS         Manual Rx 1    Manual Rx 1 Location RS for ER/ scaption  -CS      Manual Rx 1 Type 3 x 1 min each  -CS      Manual Rx 1 Grade PROM R shoulder flexion/ER  -CS                User Key  (r) = Recorded By, (t) = Taken By, (c) = Cosigned By      Initials Name Provider Type    CS Victor Manuel Luque, PT Physical Therapist                     PT OP Goals       Row Name 11/19/24 0700          PT Short Term Goals    STG Date to Achieve 11/06/24  -CS     STG 1 pt. to be I with initial HEP to facilitate self management of their condition  -CS     STG 1 Progress Met  -CS     STG 2 pt. to be educated in/verbalize understanding of the importance of posture/ergonomics in association with their condition to facilitate self management of their condition  -CS     STG 2 Progress Met  -CS     STG 3 pt to report/deomstrate adequate RUE AROM to don/doff shirts/clothiing with </= 2/10 R shoulder pain to facilitate ease of performing dressing activities  -CS     STG 3 Progress Met  -CS        Long Term Goals    LTG Date to Achieve 01/05/25  -CS     LTG 1 pt. to be I with advanced HEP to facilitate self management of their condition  -CS     LTG 1 Progress Ongoing  -CS     LTG 2 pt to demonstrate R shoulder flexion/abduction MMT >/= 4+/5 without pain to facilitate ease of performing  household/work related activities  -CS     LTG 2 Progress Ongoing;Partially Met  -CS     LTG 3 pt. to demonstrate placing/retrieving small object from an above the shoulder level shelf with her RUE to facilitate ease of performing household/work related activities  -CS     LTG 3 Progress Ongoing;Partially Met  -CS     LTG 4 pt. to demonstrate R shoulder flexion/ABD AROM >/=165 to facilitate ease of performing functional/household activities  -CS     LTG 4 Progress Ongoing;Partially Met  -CS               User Key  (r) = Recorded By, (t) = Taken By, (c) = Cosigned By      Initials Name Provider Type    CS Victor Manuel Luque, PT Physical Therapist                    Therapy Education  Education Details: Educated on continued HEP, manual techniques  Given: HEP, Symptoms/condition management, Pain management, Posture/body mechanics, Fall prevention and home safety, Mobility training  Program: New, Reinforced, Progressed  How Provided: Verbal, Demonstration, Written  Provided to: Patient  Level of Understanding: Verbalized, Demonstrated              Time Calculation:   Start Time: 0711  Stop Time: 0743  Time Calculation (min): 32 min  Timed Charges  10897 - PT Therapeutic Exercise Minutes: 27  75906 - PT Manual Therapy Minutes: 5  Total Minutes  Timed Charges Total Minutes: 32   Total Minutes: 32  Therapy Charges for Today       Code Description Service Date Service Provider Modifiers Qty    66692328988 HC PT THER PROC EA 15 MIN 11/19/2024 Victor Manuel Luque, PT GP 2                      Victor Manuel Luque PT  11/19/2024

## 2024-11-26 ENCOUNTER — APPOINTMENT (OUTPATIENT)
Dept: PHYSICAL THERAPY | Facility: HOSPITAL | Age: 69
End: 2024-11-26
Payer: MEDICARE

## 2024-11-27 ENCOUNTER — HOSPITAL ENCOUNTER (OUTPATIENT)
Dept: GENERAL RADIOLOGY | Facility: HOSPITAL | Age: 69
Discharge: HOME | End: 2024-11-27
Payer: MEDICARE

## 2024-11-27 DIAGNOSIS — M19.071 ARTHRITIS OF FIRST METATARSOPHALANGEAL (MTP) JOINT OF RIGHT FOOT: ICD-10-CM

## 2024-11-27 PROCEDURE — 25010000002 LIDOCAINE 1 % SOLUTION: Performed by: ORTHOPAEDIC SURGERY

## 2024-11-27 PROCEDURE — 25510000001 IOPAMIDOL 61 % SOLUTION: Performed by: ORTHOPAEDIC SURGERY

## 2024-11-27 PROCEDURE — 25010000002 BUPIVACAINE (PF) 0.25 % SOLUTION: Performed by: ORTHOPAEDIC SURGERY

## 2024-11-27 PROCEDURE — 77002 NEEDLE LOCALIZATION BY XRAY: CPT

## 2024-11-27 PROCEDURE — 25010000002 METHYLPREDNISOLONE PER 40 MG: Performed by: ORTHOPAEDIC SURGERY

## 2024-11-27 RX ORDER — IOPAMIDOL 612 MG/ML
30 INJECTION, SOLUTION INTRAVASCULAR
Status: COMPLETED | OUTPATIENT
Start: 2024-11-27 | End: 2024-11-27

## 2024-11-27 RX ORDER — METHYLPREDNISOLONE SODIUM SUCCINATE 40 MG/ML
40 INJECTION, POWDER, LYOPHILIZED, FOR SOLUTION INTRAMUSCULAR; INTRAVENOUS
Status: COMPLETED | OUTPATIENT
Start: 2024-11-27 | End: 2024-11-27

## 2024-11-27 RX ORDER — BUPIVACAINE HYDROCHLORIDE 2.5 MG/ML
10 INJECTION, SOLUTION EPIDURAL; INFILTRATION; INTRACAUDAL ONCE
Status: COMPLETED | OUTPATIENT
Start: 2024-11-27 | End: 2024-11-27

## 2024-11-27 RX ORDER — LIDOCAINE HYDROCHLORIDE 10 MG/ML
10 INJECTION, SOLUTION INFILTRATION; PERINEURAL ONCE
Status: COMPLETED | OUTPATIENT
Start: 2024-11-27 | End: 2024-11-27

## 2024-11-27 RX ADMIN — IOPAMIDOL 0.5 ML: 612 INJECTION, SOLUTION INTRAVENOUS at 08:26

## 2024-11-27 RX ADMIN — METHYLPREDNISOLONE SODIUM SUCCINATE 40 MG: 40 INJECTION, POWDER, FOR SOLUTION INTRAMUSCULAR; INTRAVENOUS at 08:26

## 2024-11-27 RX ADMIN — BUPIVACAINE HYDROCHLORIDE 1 ML: 2.5 INJECTION, SOLUTION EPIDURAL; INFILTRATION; INTRACAUDAL; PERINEURAL at 08:26

## 2024-11-27 RX ADMIN — LIDOCAINE HYDROCHLORIDE 0.5 ML: 10 INJECTION, SOLUTION INFILTRATION; PERINEURAL at 08:26

## 2024-12-03 ENCOUNTER — APPOINTMENT (OUTPATIENT)
Dept: PHYSICAL THERAPY | Facility: HOSPITAL | Age: 69
End: 2024-12-03
Payer: MEDICARE

## 2024-12-17 ENCOUNTER — HOSPITAL ENCOUNTER (OUTPATIENT)
Dept: PHYSICAL THERAPY | Facility: HOSPITAL | Age: 69
Setting detail: THERAPIES SERIES
Discharge: HOME | End: 2024-12-17
Payer: MEDICARE

## 2024-12-17 DIAGNOSIS — M25.511 RIGHT SHOULDER PAIN, UNSPECIFIED CHRONICITY: Primary | ICD-10-CM

## 2024-12-17 DIAGNOSIS — Z74.09 IMPAIRED MOBILITY: ICD-10-CM

## 2024-12-17 PROCEDURE — 97110 THERAPEUTIC EXERCISES: CPT | Performed by: PHYSICAL THERAPIST

## 2024-12-17 NOTE — THERAPY PROGRESS REPORT/RE-CERT
Outpatient Physical Therapy Ortho Progress Note  [facility]     Patient Name: Stacey Meyer  : 1955  MRN: [id number]  Today's Date: 2024      Visit Date: 2024    Visit Dx:    ICD-10-CM ICD-9-CM   1. Right shoulder pain, unspecified chronicity  M25.511 719.41   2. Impaired mobility  Z74.09 799.89       Patient Active Problem List   Diagnosis    Degeneration of lumbar intervertebral disc    Degeneration, intervertebral disc, thoracic    Fibromyalgia    Transient insomnia    Primary osteoarthritis of right hip    Familial ligamentous laxity    Hypertension    OA (osteoarthritis) of hip    Acquired hypothyroidism    Rheumatoid arthritis of multiple sites with negative rheumatoid factor    Gigantomastia    Erosive osteoarthritis    Depression with anxiety    Mixed hyperlipidemia    Arthritis of foot    Closed nondisplaced fracture of fifth right metatarsal bone    Arthritis of first metatarsophalangeal (MTP) joint of right foot    Blurry vision        Past Medical History:   Diagnosis Date    Allergic ?    percadan sulfa emycin tetriccyclin flagel    Anxiety     Closed nondisplaced fracture of fifth right metatarsal bone 2023    Degeneration, intervertebral disc, thoracic     Depression     Depression 2016    Erosive osteoarthritis     Dr Galeano    History of peptic ulcer     History of transfusion     AFTER HIP REPLACEMENT     Hypertension     Hypothyroidism many years    Knee swelling 2009    OA (osteoarthritis) of hip     Osteoarthritis     PONV (postoperative nausea and vomiting)     Primary osteoarthritis of right hip     Rheumatoid arthritis     Thyroid disease     HYPOTHYROID    Visual impairment readers        Past Surgical History:   Procedure Laterality Date    BILATERAL BREAST REDUCTION Bilateral 2018    Procedure: BREAST REDUCTION BILATERAL, NEW IMAGE;  Surgeon: Myranda Perea MD;  Location: [facility] OR;  Service: New Image    BREAST SURGERY   "2018    BUNIONECTOMY Left     COLONOSCOPY      2009    COLONOSCOPY N/A 06/26/2019    Procedure: COLONOSCOPY WITH COLD POLYPECTOMY x 2;  Surgeon: Danny Costa MD;  Location: [facility] ENDOSCOPY;  Service: Gastroenterology    COLONOSCOPY N/A 10/18/2024    Procedure: COLONOSCOPY to cecum to TI;  Surgeon: Danny Costa MD;  Location: [facility] ENDOSCOPY;  Service: Gastroenterology;  Laterality: N/A;  pre: colon screening, h/o polyps  post:diverticulosis, hemorrhoids    ECTOPIC PREGNANCY SURGERY Bilateral     2 SEPARATE SURGERIES    ENDOSCOPY N/A 05/24/2016    Procedure: ESOPHAGOGASTRODUODENOSCOPY WITH COLD BIOPSIES;  Surgeon: Danny Costa MD;  Location: [facility] ENDOSCOPY;  Service:     ENDOSCOPY N/A 11/01/2016    Procedure: ESOPHAGOGASTRODUODENOSCOPY;  Surgeon: Danny Costa MD;  Location: [facility] ENDOSCOPY;  Service:     FERTILITY SURGERY      FOOT SURGERY Bilateral     \"THEY BROKE MY 5TH TOE ON BOTH SIDES AND STRIGHTENED THEM\"     HAMMER TOE REPAIR Bilateral     HAND SURGERY  04/28/2021    left hand     JOINT REPLACEMENT Right 07/15/2020    hand    TONSILLECTOMY      TOTAL HIP ARTHROPLASTY Right 07/13/2016    Procedure: RIGHT TOTAL HIP ARTHROPLASTY ANTERIOR APPROACH;  Surgeon: Eric Clayton MD;  Location: [facility] OR;  Service:         PT Ortho       Row Name 12/17/24 0700       Posture/Observations    Forward Head Mild  -GJ    Rounded Shoulders Bilateral:;Moderate  -GJ    Posture/Observations Comments Noted ulnar drift bilateral hands greater on the right than the left  -GJ       Right Upper Ext    Rt Shoulder Abduction AROM 165 seated  -GJ    Rt Shoulder Flexion AROM 160 seated  -GJ       MMT Right Upper Ext    Rt Shoulder Flexion MMT, Gross Movement (4+/5) good plus  -GJ    Rt Shoulder ABduction MMT, Gross Movement (4+/5) good plus  -GJ    Rt Shoulder Internal Rotation MMT, Gross Movement (4+/5) good plus  -GJ    Rt Shoulder External Rotation MMT, Gross Movement (4+/5) good plus  -GJ              User Key  " (r) = Recorded By, (t) = Taken By, (c) = Cosigned By      Initials Name Provider Type     Vijay Munoz, PT Physical Therapist                                 PT Assessment/Plan       Row Name 12/17/24 0700          PT Assessment    Functional Limitations Limitation in home management;Limitations in community activities;Performance in leisure activities;Performance in work activities  -     Impairments Impaired flexibility;Impaired muscle endurance;Impaired muscle length;Impaired muscle power;Impaired postural alignment;Joint mobility;Muscle strength;Pain;Poor body mechanics;Posture;Range of motion  -     Assessment Comments Ms. Meyer is a 70 y/o R handed female .  She has attended 5 sessions of physical therapy for her R shoulder pain .  Treatment has included therapeutic exercise, manual therapy, therapeutic activity, and patient education with home exercise program . Progress towards goals is good, having met 3 of 3 STG's and partially meeting 3 of 4 LTG's.   See ortho section for updated objective data. She demonstrates improved scapulo humeral rhythm, similar ROM, improving MMT with less pain.  She reports improvement in her condition. She reports limited adherence to HEP, encouraged improved adherence. At this time, she wishes to trial independent management of her condition. Encouraged her to perform HEP BIW. Encouraged her to call with questions. Re-issued pictures for her HEP. Ms. Meyer will now transition from outpatient physical therapy to a trial of independent management of her condition at this time.  -     Rehab Potential Good  -     Patient/caregiver participated in establishment of treatment plan and goals Yes  -GJ     Patient would benefit from skilled therapy intervention Yes  -GJ        PT Plan    PT Frequency 1x/week;2x/week  -GJ     Predicted Duration of Therapy Intervention (PT) 10 visits  -GJ     Planned CPT's? PT RE-EVAL: 27677;PT THER PROC EA 15 MIN: 06575;PT THER ACT EA 15  MIN: 37896;PT MANUAL THERAPY EA 15 MIN: 99497;PT NEUROMUSC RE-EDUCATION EA 15 MIN: 18228;PT HOT OR COLD PACK TREAT MCARE;PT ELECTRICAL STIM UNATTEND:   -GJ     PT Plan Comments transition to trial of independent management  -GJ               User Key  (r) = Recorded By, (t) = Taken By, (c) = Cosigned By      Initials Name Provider Type    GJ Vijay Munoz, PT Physical Therapist                       OP Exercises       Row Name 12/17/24 0700 12/17/24 0658          Subjective    Subjective Comments i'm doing better, i bet if I did my exercises at home it would get even better. I'm not pain free.  -GJ --        Total Minutes    46303 - PT Therapeutic Exercise Minutes -- 40  -GJ        Exercise 1    Exercise Name 1 UBE  -GJ --     Time 1 2/2  -GJ --        Exercise 2    Exercise Name 2 Standing chin tucks  -GJ --     Cueing 2 Verbal;Demo  -GJ --     Reps 2 20  -GJ --     Time 2 5s  -GJ --     Additional Comments Noodle at back  -GJ --        Exercise 4    Exercise Name 4 shoulder flexion on wall (B)  -GJ --     Cueing 4 Verbal;Demo  -GJ --     Reps 4 15  -GJ --     Additional Comments W/ pillow case  -GJ --        Exercise 5    Exercise Name 5 scap retraction/shoulder ext  -GJ --     Cueing 5 Verbal;Demo  -GJ --     Sets 5 2  -GJ --     Reps 5 10  -GJ --     Time 5 GTB  -GJ --        Exercise 6    Exercise Name 6 B shoulder ER  -GJ --     Cueing 6 Verbal;Demo  -GJ --     Sets 6 2  -GJ --     Reps 6 10  -GJ --     Time 6 RTB  -GJ --     Additional Comments supine  -GJ --        Exercise 8    Exercise Name 8 supine HA w/ tricep ext  -GJ --     Cueing 8 Verbal;Demo  -GJ --     Sets 8 2  -GJ --     Reps 8 10  -GJ --     Additional Comments RTB  -GJ --        Exercise 9    Exercise Name 9 Standing D2 flexion  -GJ --     Cueing 9 Verbal  -GJ --     Sets 9 2  -GJ --     Reps 9 10  -GJ --     Additional Comments RTB  -GJ --               User Key  (r) = Recorded By, (t) = Taken By, (c) = Cosigned By      Initials Name  Provider Type    Vijay Moreno, PT Physical Therapist                             Manual Rx (Last 36 Hours)       Manual Treatments       Row Name 12/17/24 0700             Manual Rx 1    Manual Rx 1 Location RS for ER/ scaption  -      Manual Rx 1 Type 3 x 1 min each  -                User Key  (r) = Recorded By, (t) = Taken By, (c) = Cosigned By      Initials Name Provider Type    Vijay Moreno, PT Physical Therapist                     PT OP Goals       Row Name 12/17/24 0600          PT Short Term Goals    STG Date to Achieve 11/06/24  -GJ     STG 1 pt. to be I with initial HEP to facilitate self management of their condition  -GJ     STG 1 Progress Met  -GJ     STG 2 pt. to be educated in/verbalize understanding of the importance of posture/ergonomics in association with their condition to facilitate self management of their condition  -GJ     STG 2 Progress Met  -GJ     STG 3 pt to report/deomstrate adequate RUE AROM to don/doff shirts/clothiing with </= 2/10 R shoulder pain to facilitate ease of performing dressing activities  -GJ     STG 3 Progress Met  -GJ        Long Term Goals    LTG Date to Achieve 01/05/25  -GJ     LTG 1 pt. to be I with advanced HEP to facilitate self management of their condition  -GJ     LTG 1 Progress Ongoing  -GJ     LTG 2 pt to demonstrate R shoulder flexion/abduction MMT >/= 4+/5 without pain to facilitate ease of performing household/work related activities  -GJ     LTG 2 Progress Partially Met  -GJ     LTG 2 Progress Comments 4+/5, mild pain  -GJ     LTG 3 pt. to demonstrate placing/retrieving small object from an above the shoulder level shelf with her RUE to facilitate ease of performing household/work related activities  -GJ     LTG 3 Progress Ongoing;Partially Met  -GJ     LTG 4 pt. to demonstrate R shoulder flexion/ABD AROM >/=165 to facilitate ease of performing functional/household activities  -GJ     LTG 4 Progress Ongoing;Partially Met  -GJ     LTG  4 Progress Comments see ortho section  -GJ               User Key  (r) = Recorded By, (t) = Taken By, (c) = Cosigned By      Initials Name Provider Type    Vijay Moreno, PT Physical Therapist                    Therapy Education  Education Details: [id number], re-issued HEP reviewed activity modifications, encoruaged adherence to HEP BIW. Encouraged to call with questions  Given: HEP, Symptoms/condition management, Pain management, Posture/body mechanics, Fall prevention and home safety, Mobility training  Program: Reinforced, Modified  How Provided: Verbal, Demonstration, Written  Provided to: Patient  Level of Understanding: Teach back education performed, Verbalized, Demonstrated              Time Calculation:   Start Time: 0700  Stop Time: 0740  Time Calculation (min): 40 min  Timed Charges  42479 - PT Therapeutic Exercise Minutes: 40  Total Minutes  Timed Charges Total Minutes: 40   Total Minutes: 40  Therapy Charges for Today       Code Description Service Date Service Provider Modifiers Qty    91844711511 HC PT THER PROC EA 15 MIN 12/17/2024 Vijay Munoz, PT GP 3                      Vijay Munoz, PT  12/17/2024

## 2025-01-15 DIAGNOSIS — I10 PRIMARY HYPERTENSION: Chronic | ICD-10-CM

## 2025-01-15 RX ORDER — LOSARTAN POTASSIUM 25 MG/1
25 TABLET ORAL DAILY
Qty: 90 TABLET | Refills: 1 | Status: SHIPPED | OUTPATIENT
Start: 2025-01-15

## 2025-02-03 ENCOUNTER — TELEPHONE (OUTPATIENT)
Dept: ORTHOPEDIC SURGERY | Facility: CLINIC | Age: 70
End: 2025-02-03

## 2025-02-03 DIAGNOSIS — M19.071 ARTHRITIS OF FIRST METATARSOPHALANGEAL (MTP) JOINT OF RIGHT FOOT: Primary | ICD-10-CM

## 2025-02-03 NOTE — TELEPHONE ENCOUNTER
Caller: Stacey Meyer    Relationship to patient: Self    Best call back number: [phone]    Chief complaint: RIGHT FOOT    Type of visit: U/S GUIDED INJECTION    Requested date: TBD     Additional notes:PATIENT STATES SHE HAS THIS DONE AT [facility]

## 2025-02-07 ENCOUNTER — OFFICE VISIT (OUTPATIENT)
Dept: INTERNAL MEDICINE | Facility: CLINIC | Age: 70
End: 2025-02-07
Payer: MEDICARE

## 2025-02-07 VITALS
HEIGHT: 60 IN | DIASTOLIC BLOOD PRESSURE: 70 MMHG | WEIGHT: 110.6 LBS | BODY MASS INDEX: 21.71 KG/M2 | OXYGEN SATURATION: 98 % | HEART RATE: 68 BPM | SYSTOLIC BLOOD PRESSURE: 120 MMHG

## 2025-02-07 DIAGNOSIS — E83.52 HYPERCALCEMIA: ICD-10-CM

## 2025-02-07 DIAGNOSIS — E03.9 ACQUIRED HYPOTHYROIDISM: Primary | Chronic | ICD-10-CM

## 2025-02-07 DIAGNOSIS — I10 PRIMARY HYPERTENSION: Chronic | ICD-10-CM

## 2025-02-07 DIAGNOSIS — M06.09 RHEUMATOID ARTHRITIS OF MULTIPLE SITES WITH NEGATIVE RHEUMATOID FACTOR: Chronic | ICD-10-CM

## 2025-02-07 PROCEDURE — 1125F AMNT PAIN NOTED PAIN PRSNT: CPT | Performed by: NURSE PRACTITIONER

## 2025-02-07 PROCEDURE — G2211 COMPLEX E/M VISIT ADD ON: HCPCS | Performed by: NURSE PRACTITIONER

## 2025-02-07 PROCEDURE — 1160F RVW MEDS BY RX/DR IN RCRD: CPT | Performed by: NURSE PRACTITIONER

## 2025-02-07 PROCEDURE — 99214 OFFICE O/P EST MOD 30 MIN: CPT | Performed by: NURSE PRACTITIONER

## 2025-02-07 PROCEDURE — 3074F SYST BP LT 130 MM HG: CPT | Performed by: NURSE PRACTITIONER

## 2025-02-07 PROCEDURE — 3078F DIAST BP <80 MM HG: CPT | Performed by: NURSE PRACTITIONER

## 2025-02-07 PROCEDURE — 1159F MED LIST DOCD IN RCRD: CPT | Performed by: NURSE PRACTITIONER

## 2025-02-07 NOTE — PROGRESS NOTES
Chief Complaint  Hypothyroidism (6 month follow up )     Subjective:      History of Present Illness {CC  Problem List  Visit  Diagnosis   Encounters  Notes  Medications  Labs  Result Review Imaging  Media :23}     Stacey Meyer presents to [facility] for:  Hypertension, hypothyroid, rheumatoid arthritis, depression       Hypertension: chronic  Continues losartan 25 mg once daily  No CP, SOA    RA: continues plaquenil     Hypothyroid: chronic  Continues synthroid - no new symptoms      Injections for foot was working in the past but now not as effective.   Option is surgery - will have one more to see how it does.     Son in law - still waiting for heart transplant.     10/18/24: C scope: [place], sigmoid diverticulosis.  Repeat colonoscopy in 7 years for surveillance.    Last visit: calcium mildly elevated.  Not taking any supplements  No report N/V/D, abd pain       Still seeing grief counselor - 1 year anniversary son is soon.   Still has good and bad days.  Good support system.         Answers submitted by the patient for this visit:  Problem not listed (Submitted on 2/4/2025)  Chief Complaint: Other medical problem  Reason for appointment: check up  abdominal pain: No  anorexia: No  joint pain: No  change in stool: No  chest pain: No  chills: No  nasal congestion: No  cough: No  diaphoresis: No  fatigue: No  fever: No  headaches: No  joint swelling: No  myalgias: No  nausea: No  neck pain: No  numbness: No  rash: No  sore throat: No  swollen glands: No  dysuria: No  vertigo: No  visual change: No  vomiting: No  weakness: No      I have reviewed patient's medical history, any new submitted information provided by patient or medical assistant and updated medical record.      Objective:      Physical Exam  Cardiovascular:      Rate and Rhythm: Normal rate and regular rhythm.      Pulses: Normal pulses.      Heart sounds: Normal heart sounds.   Pulmonary:      Effort:  "Pulmonary effort is normal.      Breath sounds: Normal breath sounds.   Neurological:      Mental Status: She is oriented to person, place, and time.        Result Review  Data Reviewed:{ Labs  Result Review  Imaging  Med Tab  Media :23}     The following data was reviewed by: Kayli Tucker III, NP-C on 02/07/2025  Common labs          7/29/2024    11:48   Common Labs   Glucose 89    BUN 14    Creatinine 0.83    Sodium 138    Potassium 4.8    Chloride 100    Calcium 10.7    Total Protein 7.0    Albumin 4.9    Total Bilirubin 0.5    Alkaline Phosphatase 64    AST (SGOT) 29    ALT (SGPT) 16    WBC 4.76    Hemoglobin 14.2    Hematocrit 44.2    Platelets 397    Total Cholesterol 221    Triglycerides 88    HDL Cholesterol 72    LDL Cholesterol  134             Vital Signs:   /70 (BP Location: Left arm, Patient Position: Sitting, Cuff Size: Adult)   Pulse 68   Ht 152.4 cm (60\")   Wt 50.2 kg (110 lb 9.6 oz)   SpO2 98%   BMI 21.60 kg/m²   Estimated body mass index is 21.6 kg/m² as calculated from the following:    Height as of this encounter: 152.4 cm (60\").    Weight as of this encounter: 50.2 kg (110 lb 9.6 oz).        Requested Prescriptions      No prescriptions requested or ordered in this encounter       Routine medications provided by this office will also be refilled via pharmacy request.       Current Outpatient Medications:     hydroxychloroquine (PLAQUENIL) 200 MG tablet, Take 1 tablet by mouth Every Other Day., Disp: , Rfl:     hydroxychloroquine (PLAQUENIL) 200 MG tablet, Take 2 tablets by mouth Every Other Day., Disp: , Rfl:     levothyroxine (SYNTHROID, LEVOTHROID) 112 MCG tablet, TAKE 1 TABLET BY MOUTH EVERY DAY (Patient taking differently: Take 1 tablet by mouth Every Evening.), Disp: 90 tablet, Rfl: 1    losartan (COZAAR) 25 MG tablet, TAKE 1 TABLET BY MOUTH EVERY DAY, Disp: 90 tablet, Rfl: 1     Assessment and Plan:      Assessment and Plan {CC Problem List  Visit Diagnosis "  ROS  Review (Popup)  [facility]  Quality  BestPractice  Medications  SmartSets  SnapShot Encounters  Media :23}     Diagnoses and all orders for this visit:    1. Acquired hypothyroidism (Primary)  Assessment & Plan:  Stable   Check lab for ongoing therapeutic drug monitoring.  Will modify dose if needed based upon results.     Orders:  -     TSH Rfx On Abnormal To Free T4    2. Primary hypertension  Overview:  Prior treatment:  Avalide, losartan   Current treatment:  losartan     Assessment & Plan:  Hypertension is improving with treatment.  Medication changes per orders.  Blood pressure will be reassessed at the next regular appointment.  Continue losartan 25 mg daily.       Orders:  -     Comprehensive Metabolic Panel  -     CBC (No Diff)    3. Hypercalcemia  -     Calcium, Ionized  -     Vitamin D 25 hydroxy    4. Rheumatoid arthritis of multiple sites with negative rheumatoid factor  Overview:  Seronegative RA - has been seen by Dr Galeano  Tried Humira 2020: no improvement, discontinued     Current treatment: plaquenil       Orders:  -     Comprehensive Metabolic Panel  -     CBC (No Diff)             No orders of the defined types were placed in this encounter.            Follow Up {Instructions Charge Capture  Follow-up Communications :23}     Return in about 6 months (around 8/7/2025) for Medicare Wellness.      Patient was given instructions and counseling regarding her condition or for health maintenance advice. Please see specific information pulled into the AVS if appropriate.    Sondra disclaimer:   Much of this encounter note is an electronic transcription/translation of spoken language to printed text. The electronic translation of spoken language may permit erroneous, or at times, nonsensical words or phrases to be inadvertently transcribed; Although I have reviewed the note for such errors, some may still exist.     Additional Patient Counseling:       There are no Patient  Instructions on file for this visit.

## 2025-02-10 LAB
25(OH)D3+25(OH)D2 SERPL-MCNC: 12.7 NG/ML (ref 30–100)
ALBUMIN SERPL-MCNC: 4.5 G/DL (ref 3.5–5.2)
ALBUMIN/GLOB SERPL: 2 G/DL
ALP SERPL-CCNC: 74 U/L (ref 39–117)
ALT SERPL-CCNC: 24 U/L (ref 1–33)
AST SERPL-CCNC: 30 U/L (ref 1–32)
BILIRUB SERPL-MCNC: 0.7 MG/DL (ref 0–1.2)
BUN SERPL-MCNC: 13 MG/DL (ref 8–23)
BUN/CREAT SERPL: 18.8 (ref 7–25)
CA-I SERPL ISE-MCNC: 5.3 MG/DL (ref 4.5–5.6)
CALCIUM SERPL-MCNC: 10.7 MG/DL (ref 8.6–10.5)
CHLORIDE SERPL-SCNC: 97 MMOL/L (ref 98–107)
CO2 SERPL-SCNC: 28.8 MMOL/L (ref 22–29)
CREAT SERPL-MCNC: 0.69 MG/DL (ref 0.57–1)
EGFRCR SERPLBLD CKD-EPI 2021: 95.3 ML/MIN/1.73
ERYTHROCYTE [DISTWIDTH] IN BLOOD BY AUTOMATED COUNT: 12.9 % (ref 12.3–15.4)
GLOBULIN SER CALC-MCNC: 2.2 GM/DL
GLUCOSE SERPL-MCNC: 84 MG/DL (ref 65–99)
HCT VFR BLD AUTO: 36.2 % (ref 34–46.6)
HGB BLD-MCNC: 12.8 G/DL (ref 12–15.9)
MCH RBC QN AUTO: 32.7 PG (ref 26.6–33)
MCHC RBC AUTO-ENTMCNC: 35.4 G/DL (ref 31.5–35.7)
MCV RBC AUTO: 92.3 FL (ref 79–97)
PLATELET # BLD AUTO: 366 10*3/MM3 (ref 140–450)
POTASSIUM SERPL-SCNC: 4.9 MMOL/L (ref 3.5–5.2)
PROT SERPL-MCNC: 6.7 G/DL (ref 6–8.5)
RBC # BLD AUTO: 3.92 10*6/MM3 (ref 3.77–5.28)
SODIUM SERPL-SCNC: 136 MMOL/L (ref 136–145)
TSH SERPL DL<=0.005 MIU/L-ACNC: 3.98 UIU/ML (ref 0.27–4.2)
WBC # BLD AUTO: 4.49 10*3/MM3 (ref 3.4–10.8)

## 2025-04-01 ENCOUNTER — HOSPITAL ENCOUNTER (OUTPATIENT)
Dept: GENERAL RADIOLOGY | Facility: HOSPITAL | Age: 70
Discharge: HOME | End: 2025-04-01
Admitting: ORTHOPAEDIC SURGERY
Payer: MEDICARE

## 2025-04-01 DIAGNOSIS — M19.071 ARTHRITIS OF FIRST METATARSOPHALANGEAL (MTP) JOINT OF RIGHT FOOT: ICD-10-CM

## 2025-04-01 PROCEDURE — 25510000001 IOPAMIDOL 61 % SOLUTION: Performed by: ORTHOPAEDIC SURGERY

## 2025-04-01 PROCEDURE — 25010000002 BUPIVACAINE (PF) 0.25 % SOLUTION: Performed by: ORTHOPAEDIC SURGERY

## 2025-04-01 PROCEDURE — 25010000002 LIDOCAINE 1 % SOLUTION: Performed by: ORTHOPAEDIC SURGERY

## 2025-04-01 PROCEDURE — 77002 NEEDLE LOCALIZATION BY XRAY: CPT

## 2025-04-01 PROCEDURE — 25010000002 METHYLPREDNISOLONE PER 40 MG: Performed by: ORTHOPAEDIC SURGERY

## 2025-04-01 RX ORDER — LIDOCAINE HYDROCHLORIDE 10 MG/ML
10 INJECTION, SOLUTION INFILTRATION; PERINEURAL
Status: COMPLETED | OUTPATIENT
Start: 2025-04-01 | End: 2025-04-01

## 2025-04-01 RX ORDER — IOPAMIDOL 612 MG/ML
50 INJECTION, SOLUTION INTRAVASCULAR
Status: COMPLETED | OUTPATIENT
Start: 2025-04-01 | End: 2025-04-01

## 2025-04-01 RX ORDER — BUPIVACAINE HYDROCHLORIDE 2.5 MG/ML
10 INJECTION, SOLUTION EPIDURAL; INFILTRATION; INTRACAUDAL; PERINEURAL
Status: COMPLETED | OUTPATIENT
Start: 2025-04-01 | End: 2025-04-01

## 2025-04-01 RX ORDER — METHYLPREDNISOLONE ACETATE 40 MG/ML
40 INJECTION, SUSPENSION INTRA-ARTICULAR; INTRALESIONAL; INTRAMUSCULAR; SOFT TISSUE ONCE
Status: COMPLETED | OUTPATIENT
Start: 2025-04-01 | End: 2025-04-01

## 2025-04-01 RX ADMIN — IOPAMIDOL 1 ML: 612 INJECTION, SOLUTION INTRAVENOUS at 08:35

## 2025-04-01 RX ADMIN — BUPIVACAINE HYDROCHLORIDE 2 ML: 2.5 INJECTION, SOLUTION EPIDURAL; INFILTRATION; INTRACAUDAL; PERINEURAL at 08:35

## 2025-04-01 RX ADMIN — LIDOCAINE HYDROCHLORIDE 1 ML: 10 INJECTION, SOLUTION INFILTRATION; PERINEURAL at 08:35

## 2025-04-01 RX ADMIN — METHYLPREDNISOLONE ACETATE 40 MG: 40 INJECTION, SUSPENSION INTRA-ARTICULAR; INTRALESIONAL; INTRAMUSCULAR; SOFT TISSUE at 08:35

## 2025-04-04 DIAGNOSIS — E03.9 ACQUIRED HYPOTHYROIDISM: Chronic | ICD-10-CM

## 2025-04-04 RX ORDER — LEVOTHYROXINE SODIUM 112 UG/1
112 TABLET ORAL DAILY
Qty: 90 TABLET | Refills: 1 | Status: SHIPPED | OUTPATIENT
Start: 2025-04-04

## 2025-05-15 ENCOUNTER — TELEPHONE (OUTPATIENT)
Dept: INTERNAL MEDICINE | Facility: CLINIC | Age: 70
End: 2025-05-15

## 2025-05-15 RX ORDER — ONDANSETRON 4 MG/1
4 TABLET, FILM COATED ORAL EVERY 8 HOURS PRN
Qty: 12 TABLET | Refills: 0 | Status: SHIPPED | OUTPATIENT
Start: 2025-05-15

## 2025-05-15 NOTE — TELEPHONE ENCOUNTER
Caller: Stacey Meyer    Relationship: Self    Best call back number: [phone]     What medication are you requesting: SOMETHING TO HELP WITH NAUSEA    What are your current symptoms: NAUSEA    How long have you been experiencing symptoms: STARTED ON TUESDAY 05/13/25    Have you had these symptoms before:    [x] Yes  [] No    Have you been treated for these symptoms before:   [] Yes  [x] No    If a prescription is needed, what is your preferred pharmacy and phone number: [id number] - [place], [place] - [address] AT [address] [facility] - [phone] [facility] [phone] FX     Additional notes: PATIENT IS GOING OUT OF TOWN AND IS NEEDING SOMETHING FOR NAUSEA,

## 2025-05-15 NOTE — TELEPHONE ENCOUNTER
Patient is recovering from a stomach bug and is wanting something for nausea. Is going out of town for a wedding tomorrow.     She would like prescription sent to her local [facility] pharmacy

## 2025-07-11 NOTE — PROGRESS NOTES
"Foot Follow Up      Patient: Stacey Meyer    YOB: 1955 69 y.o. female    Chief Complaints: Toe \"Not getting any better but manageable\"    History of Present Illness:Patient was seen initially on 7/27/2023 with reporting history of rheumatoid arthritis and reported a history of chronic pain in her right first MTP joint that shoots into her toe.  She had been followed by Dr. Harvey and had an injection done in the spring 2023 which gave her some initial relief with recurrence of symptoms.  She reported that she had some type of previous hammertoe procedures on the fourth and fifth also thought that she had an old fifth metatarsal fracture.  She had left bunion corrected many years ago.     She was felt to have arthritis of the first MTP joint with hallux rigidus as well as previous fourth and fifth hammertoe procedures and previous right fifth metatarsal fracture with asymptomatic shortening of the fifth ray.  Main thing that was bothering her was her first MTP joint.  We discussed hierarchy of treatment reviewed she may ultimately require fusion which she did not wish to proceed with and her  just had knee replacement by Dr. Clayton and her son-in-law was awaiting cardiac transplant.  We discussed nonoperative options and she was prescribed compounding cream and instructed on heel cord stretching exercises for the carbon fiber insert and sent for radiographic guided injection of the right first MTP joint.  Patient had radiographically guided injection of the right first MTP joint on 8/29/2023.       She was seen on 10/16/2023 stating that she remained much better and that the injection was \"a game changer \"and had made a \"night and day\" difference..  She was even actually able to wear high heels at a wedding.  She did not feel that the topical helped much.  Current pain was rated 0 out of 10.     We discussed treatment at that time and she did not wish to pursue surgical treatment with other " issues that she had going on but understood that fusion could be a more definitive treatment if symptoms came refractory to injection.  She was instructed to continue with stretching and use compounding cream as needed.     I had spoken with the patient several times and she has had repeat injections done on 12/27/2023, 4/10/2024 and 8/22/2024.     Patient was seen on 10/2/2020 for reporting that she had still have some discomfort in her right great toe but the injections had made things more manageable.  She had not really been doing any heel cord stretching once symptoms h had improved and previously the topical compounding cream had not really helped.  Pain was rated 3 out of 10.    We discussed treatment going forward and expressed desire to have another injection done at a time for the holidays and would call in November to have it scheduled.  We discussed other measures and she was instructed to resume heel cord stretching exercises and provided an instruction sheet.  We are also going to see about having Dana contact her about carbon fiber inserts.  Reviewed at that point we could continue with injections every 3 to 4 months and if became refractory would need to proceed with fusion.     Patient had fluoroscopic guided injection of the right first MTP joint on 11/27/2024 and subsequently again on 4/1/2025..  Patient seen back today reporting that her toe is not getting any better with the right first toe but is manageable.  She reports the injection she had on 4/1/2025 she had immediate relief from the local and had about 50% relief for several weeks.  She reports intermittent pain in the right first toe but it is manageable and was able to walk 2 miles this morning.  She reports the compounding cream prescription has not really helped but does use a salon patch which did provide some relief.  She never got the carbon fiber insert nor has she been doing heel cord stretching.        HPI    ROS: Foot  "pain  Past Medical History:   Diagnosis Date    Allergic ?    percadan sulfa emycin tetriccyclin flagel    Anxiety     Closed nondisplaced fracture of fifth right metatarsal bone 07/27/2023    Degeneration, intervertebral disc, thoracic     Depression     Depression 06/04/2016    Erosive osteoarthritis     Dr Galeano    History of peptic ulcer     History of transfusion     AFTER HIP REPLACEMENT     Hypertension     Hypothyroidism many years    Knee swelling 09/2009    OA (osteoarthritis) of hip     Osteoarthritis     PONV (postoperative nausea and vomiting)     Primary osteoarthritis of right hip     Rheumatoid arthritis     Thyroid disease     HYPOTHYROID    Visual impairment readers     Physical Exam:   Vitals:    07/14/25 0808   Temp: 96.9 °F (36.1 °C)   Weight: 48.6 kg (107 lb 3.2 oz)   Height: 152.4 cm (60\")   PainSc: 5      Well developed with normal mood.  On exam she has a nonantalgic gait.  There is moderate bony prominences around the first MTP joint.  She had 30 degrees dorsiflexion 10 degrees plantarflexion with mild discomfort.  She neutral dorsiflexion of the heel cord      Radiology: 3 views right foot ordered evaluate pain alignment reviewed and compared to previous x-rays do show severe persistent arthritis of the first metatarsophalangeal joint as well as previous fifth metatarsal fracture osteotomy with shortening of the fifth metatarsal.    Reviewed report of injection of right first MTP joint from 11/27/2024 which describes 0% relief and 4/1/2025 and there was no indication of degree of pain relief on that report.      Assessment/Plan:   1.  Right first MTP arthritis with hallux rigidus  2.  Previous right fifth metatarsal fracture or possible osteotomy and fourth and fifth hammertoe procedures done elsewhere with asymptomatic shortening of the fifth ray.    We discussed treatment going forward and reviewed that operative treatment would entail fusion reviewed that in detail with her.  " However her son-in-law is awaiting a cardiac transplant and she and her  have to tell take care of his kids etc. while he is convalescing from that in [place] and they do not know when it is going to be.  Therefore she does not wish to pursue any surgical treatment at this time and symptoms are manageable.  The compounding cream has not really been of benefit so we will not do that prescription anymore.  She was advised again on heel cord stretching exercises and discussed etiology of heel cord tightness and forefoot overload.  Instruction sheet was again provided.  She was also provided with a carbon fiber insert.    She would also like to try a repeat injection which I think is reasonable and this was ordered for fluoroscopic guided injection of the right first MTP joint    Anything worsens she will let me know otherwise I will see her back in 3 months with x-rays of her right foot.

## 2025-07-12 DIAGNOSIS — I10 PRIMARY HYPERTENSION: Chronic | ICD-10-CM

## 2025-07-14 ENCOUNTER — OFFICE VISIT (OUTPATIENT)
Dept: ORTHOPEDIC SURGERY | Facility: CLINIC | Age: 70
End: 2025-07-14
Payer: MEDICARE

## 2025-07-14 VITALS — TEMPERATURE: 96.9 F | HEIGHT: 60 IN | BODY MASS INDEX: 21.05 KG/M2 | WEIGHT: 107.2 LBS

## 2025-07-14 DIAGNOSIS — S92.354S CLOSED NONDISPLACED FRACTURE OF FIFTH METATARSAL BONE OF RIGHT FOOT, SEQUELA: ICD-10-CM

## 2025-07-14 DIAGNOSIS — M19.071 ARTHRITIS OF RIGHT FOOT: ICD-10-CM

## 2025-07-14 DIAGNOSIS — M19.071 ARTHRITIS OF FIRST METATARSOPHALANGEAL (MTP) JOINT OF RIGHT FOOT: Primary | ICD-10-CM

## 2025-07-14 PROCEDURE — 73630 X-RAY EXAM OF FOOT: CPT | Performed by: ORTHOPAEDIC SURGERY

## 2025-07-14 PROCEDURE — 99214 OFFICE O/P EST MOD 30 MIN: CPT | Performed by: ORTHOPAEDIC SURGERY

## 2025-07-14 RX ORDER — LOSARTAN POTASSIUM 25 MG/1
25 TABLET ORAL DAILY
Qty: 90 TABLET | Refills: 1 | Status: SHIPPED | OUTPATIENT
Start: 2025-07-14

## 2025-08-08 ENCOUNTER — OFFICE VISIT (OUTPATIENT)
Dept: INTERNAL MEDICINE | Facility: CLINIC | Age: 70
End: 2025-08-08
Payer: MEDICARE

## 2025-08-08 VITALS
OXYGEN SATURATION: 100 % | DIASTOLIC BLOOD PRESSURE: 70 MMHG | HEART RATE: 82 BPM | SYSTOLIC BLOOD PRESSURE: 118 MMHG | HEIGHT: 60 IN | BODY MASS INDEX: 21.13 KG/M2 | WEIGHT: 107.6 LBS

## 2025-08-08 DIAGNOSIS — I10 PRIMARY HYPERTENSION: Chronic | ICD-10-CM

## 2025-08-08 DIAGNOSIS — E78.2 MIXED HYPERLIPIDEMIA: ICD-10-CM

## 2025-08-08 DIAGNOSIS — E55.9 VITAMIN D DEFICIENCY: ICD-10-CM

## 2025-08-08 DIAGNOSIS — Z00.00 MEDICARE ANNUAL WELLNESS VISIT, SUBSEQUENT: Primary | ICD-10-CM

## 2025-08-08 DIAGNOSIS — M06.09 RHEUMATOID ARTHRITIS OF MULTIPLE SITES WITH NEGATIVE RHEUMATOID FACTOR: Chronic | ICD-10-CM

## 2025-08-08 DIAGNOSIS — E83.52 HYPERCALCEMIA: ICD-10-CM

## 2025-08-08 DIAGNOSIS — E03.9 ACQUIRED HYPOTHYROIDISM: Chronic | ICD-10-CM

## 2025-08-09 LAB
25(OH)D3+25(OH)D2 SERPL-MCNC: 31.9 NG/ML (ref 30–100)
ALBUMIN SERPL-MCNC: 4.8 G/DL (ref 3.5–5.2)
ALBUMIN/GLOB SERPL: 2.2 G/DL
ALP SERPL-CCNC: 80 U/L (ref 39–117)
ALT SERPL-CCNC: 21 U/L (ref 1–33)
AST SERPL-CCNC: 30 U/L (ref 1–32)
BILIRUB SERPL-MCNC: 0.9 MG/DL (ref 0–1.2)
BUN SERPL-MCNC: 11 MG/DL (ref 8–23)
BUN/CREAT SERPL: 15.7 (ref 7–25)
CA-I SERPL ISE-MCNC: 5.5 MG/DL (ref 4.5–5.6)
CALCIUM SERPL-MCNC: 10.7 MG/DL (ref 8.6–10.5)
CHLORIDE SERPL-SCNC: 101 MMOL/L (ref 98–107)
CHOLEST SERPL-MCNC: 242 MG/DL (ref 0–200)
CO2 SERPL-SCNC: 26.8 MMOL/L (ref 22–29)
CREAT SERPL-MCNC: 0.7 MG/DL (ref 0.57–1)
EGFRCR SERPLBLD CKD-EPI 2021: 94.9 ML/MIN/1.73
ERYTHROCYTE [DISTWIDTH] IN BLOOD BY AUTOMATED COUNT: 13.1 % (ref 12.3–15.4)
GLOBULIN SER CALC-MCNC: 2.2 GM/DL
GLUCOSE SERPL-MCNC: 86 MG/DL (ref 65–99)
HCT VFR BLD AUTO: 41.7 % (ref 34–46.6)
HDLC SERPL-MCNC: 82 MG/DL (ref 40–60)
HGB BLD-MCNC: 13.9 G/DL (ref 12–15.9)
LDLC SERPL CALC-MCNC: 143 MG/DL (ref 0–100)
LDLC/HDLC SERPL: 1.71 {RATIO}
MCH RBC QN AUTO: 30.7 PG (ref 26.6–33)
MCHC RBC AUTO-ENTMCNC: 33.3 G/DL (ref 31.5–35.7)
MCV RBC AUTO: 92.1 FL (ref 79–97)
PLATELET # BLD AUTO: 410 10*3/MM3 (ref 140–450)
POTASSIUM SERPL-SCNC: 5.4 MMOL/L (ref 3.5–5.2)
PROT SERPL-MCNC: 7 G/DL (ref 6–8.5)
RBC # BLD AUTO: 4.53 10*6/MM3 (ref 3.77–5.28)
SODIUM SERPL-SCNC: 139 MMOL/L (ref 136–145)
TRIGL SERPL-MCNC: 100 MG/DL (ref 0–150)
TSH SERPL DL<=0.005 MIU/L-ACNC: 3.89 UIU/ML (ref 0.27–4.2)
VLDLC SERPL CALC-MCNC: 17 MG/DL (ref 5–40)
WBC # BLD AUTO: 4.7 10*3/MM3 (ref 3.4–10.8)

## 2025-08-11 ENCOUNTER — TELEPHONE (OUTPATIENT)
Dept: INTERNAL MEDICINE | Facility: CLINIC | Age: 70
End: 2025-08-11
Payer: MEDICARE

## 2025-08-12 ENCOUNTER — HOSPITAL ENCOUNTER (OUTPATIENT)
Dept: GENERAL RADIOLOGY | Facility: HOSPITAL | Age: 70
Discharge: HOME | End: 2025-08-12
Admitting: ORTHOPAEDIC SURGERY
Payer: MEDICARE

## 2025-08-12 DIAGNOSIS — M19.071 ARTHRITIS OF FIRST METATARSOPHALANGEAL (MTP) JOINT OF RIGHT FOOT: ICD-10-CM

## 2025-08-12 PROCEDURE — 25010000002 METHYLPREDNISOLONE PER 40 MG: Performed by: ORTHOPAEDIC SURGERY

## 2025-08-12 PROCEDURE — 25510000001 IOPAMIDOL 61 % SOLUTION: Performed by: ORTHOPAEDIC SURGERY

## 2025-08-12 PROCEDURE — 77002 NEEDLE LOCALIZATION BY XRAY: CPT

## 2025-08-12 PROCEDURE — 25010000002 LIDOCAINE 1 % SOLUTION: Performed by: ORTHOPAEDIC SURGERY

## 2025-08-12 PROCEDURE — 25010000002 BUPIVACAINE (PF) 0.25 % SOLUTION: Performed by: ORTHOPAEDIC SURGERY

## 2025-08-12 RX ORDER — METHYLPREDNISOLONE SODIUM SUCCINATE 40 MG/ML
40 INJECTION, POWDER, LYOPHILIZED, FOR SOLUTION INTRAMUSCULAR; INTRAVENOUS
Status: COMPLETED | OUTPATIENT
Start: 2025-08-12 | End: 2025-08-12

## 2025-08-12 RX ORDER — LIDOCAINE HYDROCHLORIDE 10 MG/ML
10 INJECTION, SOLUTION INFILTRATION; PERINEURAL ONCE
Status: COMPLETED | OUTPATIENT
Start: 2025-08-12 | End: 2025-08-12

## 2025-08-12 RX ORDER — BUPIVACAINE HYDROCHLORIDE 2.5 MG/ML
10 INJECTION, SOLUTION EPIDURAL; INFILTRATION; INTRACAUDAL; PERINEURAL ONCE
Status: COMPLETED | OUTPATIENT
Start: 2025-08-12 | End: 2025-08-12

## 2025-08-12 RX ORDER — IOPAMIDOL 612 MG/ML
30 INJECTION, SOLUTION INTRAVASCULAR
Status: COMPLETED | OUTPATIENT
Start: 2025-08-12 | End: 2025-08-12

## 2025-08-12 RX ADMIN — LIDOCAINE HYDROCHLORIDE 0.5 ML: 10 INJECTION, SOLUTION INFILTRATION; PERINEURAL at 09:12

## 2025-08-12 RX ADMIN — BUPIVACAINE HYDROCHLORIDE 2 ML: 2.5 INJECTION, SOLUTION EPIDURAL; INFILTRATION; INTRACAUDAL; PERINEURAL at 09:12

## 2025-08-12 RX ADMIN — METHYLPREDNISOLONE SODIUM SUCCINATE 40 MG: 40 INJECTION, POWDER, FOR SOLUTION INTRAMUSCULAR; INTRAVENOUS at 09:12

## 2025-08-12 RX ADMIN — IOPAMIDOL 0.5 ML: 612 INJECTION, SOLUTION INTRAVENOUS at 09:12

## 2025-08-13 ENCOUNTER — APPOINTMENT (OUTPATIENT)
Dept: WOMENS IMAGING | Facility: HOSPITAL | Age: 70
End: 2025-08-13
Payer: MEDICARE

## 2025-08-13 PROCEDURE — 76642 ULTRASOUND BREAST LIMITED: CPT | Performed by: RADIOLOGY

## (undated) DEVICE — LN SMPL CO2 SHTRM SD STREAM W/M LUER

## (undated) DEVICE — ANTIBACTERIAL UNDYED BRAIDED (POLYGLACTIN 910), SYNTHETIC ABSORBABLE SUTURE: Brand: COATED VICRYL

## (undated) DEVICE — 1842 FOAM BLOCK NEEDLE COUNTER: Brand: DEVON

## (undated) DEVICE — SUT MNCRYL PLS ANTIB UD 4/0 PS2 18IN

## (undated) DEVICE — DRSNG TELFA PAD NONADH STR 1S 3X8IN

## (undated) DEVICE — SUT GUT PLAIN 3/0 LIGAPAK  54IN  L102G

## (undated) DEVICE — ENCORE® LATEX ACCLAIM SIZE 6, STERILE LATEX POWDER-FREE SURGICAL GLOVE: Brand: ENCORE

## (undated) DEVICE — SUT MNCRYL 4/0 PS2 18 IN

## (undated) DEVICE — SUT VIC 3/0 SUTUPAK TIES 18IN J910T

## (undated) DEVICE — GLV SURG TRIUMPH CLASSIC PF LTX 6 STRL

## (undated) DEVICE — SUT GUT PLN 5/0 P3 18IN 686G

## (undated) DEVICE — PK UNIV PLSTC 40

## (undated) DEVICE — DRN JP RND NO TROC SIL 19F 1/4IN

## (undated) DEVICE — SUT ETHLN 5/0 P3 18IN 698G

## (undated) DEVICE — SENSR O2 OXIMAX FNGR A/ 18IN NONSTR

## (undated) DEVICE — SPNG GZ STRL 2S 4X4 12PLY

## (undated) DEVICE — DRAPE,CHEST,FENES,15X10,STERIL: Brand: MEDLINE

## (undated) DEVICE — CANN NASL CO2 TRULINK W/O2 A/

## (undated) DEVICE — DRAPE,REIN 53X77,STERILE: Brand: MEDLINE

## (undated) DEVICE — SINGLE-USE BIOPSY FORCEPS: Brand: RADIAL JAW 4

## (undated) DEVICE — SUT PDS 4/0 PS2 18IN CLR Z496G

## (undated) DEVICE — TP PAPR MICROPORE 2IN

## (undated) DEVICE — BANDAGE,GAUZE,BULKEE II,4.5"X4.1YD,STRL: Brand: MEDLINE

## (undated) DEVICE — NDL SPINE 22G 31/2IN BLK

## (undated) DEVICE — JACKSON-PRATT 100CC BULB RESERVOIR: Brand: CARDINAL HEALTH

## (undated) DEVICE — SUT VIC 3/0 CTI 36IN J944H

## (undated) DEVICE — SUT MNCRYL 3/0 PS2 18IN MCP497G

## (undated) DEVICE — EXTRCT STPL SKIN STRL BX/12

## (undated) DEVICE — SUT VIC 3/0 SH 27IN J416H

## (undated) DEVICE — ELECTRD BLD EZ CLN STD 2.5IN

## (undated) DEVICE — TOTAL TRAY, 16FR 10ML SIL FOLEY, URN: Brand: MEDLINE

## (undated) DEVICE — KT ORCA ORCAPOD DISP STRL

## (undated) DEVICE — Device

## (undated) DEVICE — SUT ETHLN 3/0 PS1 18IN 1663H

## (undated) DEVICE — 3M™ STERI-STRIP™ REINFORCED ADHESIVE SKIN CLOSURES, R1547, 1/2 IN X 4 IN (12 MM X 100 MM), 6 STRIPS/ENVELOPE: Brand: 3M™ STERI-STRIP™

## (undated) DEVICE — SUT MNCRYL 3/0 PS2 18IN Y497G

## (undated) DEVICE — PROXIMATE RH ROTATING HEAD SKIN STAPLERS (35 WIDE) CONTAINS 35 STAINLESS STEEL STAPLES: Brand: PROXIMATE

## (undated) DEVICE — MARKR SKIN W/RULR AND LBL

## (undated) DEVICE — GOWN,NON-REINFORCED,SIRUS,SET IN SLV,XL: Brand: MEDLINE

## (undated) DEVICE — PK BRST CHST

## (undated) DEVICE — IRRIGATOR BULB ASEPTO 60CC STRL

## (undated) DEVICE — CANN O2 ETCO2 FITS ALL CONN CO2 SMPL A/ 7IN DISP LF

## (undated) DEVICE — SPNG GZ WOVN 4X4IN 12PLY 10/BX STRL

## (undated) DEVICE — ADAPT CLN BIOGUARD AIR/H2O DISP

## (undated) DEVICE — 1010 S-DRAPE TOWEL DRAPE 10/BX: Brand: STERI-DRAPE™

## (undated) DEVICE — Device: Brand: DEFENDO AIR/WATER/SUCTION AND BIOPSY VALVE

## (undated) DEVICE — TUBING, SUCTION, 1/4" X 10', STRAIGHT: Brand: MEDLINE

## (undated) DEVICE — TBG SXN LIPO 3/8IDX5/8IN ODX10FT DISP

## (undated) DEVICE — APPL CHLORAPREP W/TINT 26ML ORNG

## (undated) DEVICE — INTENDED FOR TISSUE SEPARATION, AND OTHER PROCEDURES THAT REQUIRE A SHARP SURGICAL BLADE TO PUNCTURE OR CUT.: Brand: BARD-PARKER ® CARBON RIB-BACK BLADES

## (undated) DEVICE — SUT VIC 3/0 PS2 27IN J427H

## (undated) DEVICE — SPNG LAP 18X18IN LF STRL PK/5

## (undated) DEVICE — THE TORRENT IRRIGATION SCOPE CONNECTOR IS USED WITH THE TORRENT IRRIGATION TUBING TO PROVIDE IRRIGATION FLUIDS SUCH AS STERILE WATER DURING GASTROINTESTINAL ENDOSCOPIC PROCEDURES WHEN USED IN CONJUNCTION WITH AN IRRIGATION PUMP (OR ELECTROSURGICAL UNIT).: Brand: TORRENT

## (undated) DEVICE — ELECTRD BLD EXT EDGE 1P COAT 6.5IN STRL